# Patient Record
Sex: FEMALE | Race: WHITE | NOT HISPANIC OR LATINO | Employment: OTHER | URBAN - METROPOLITAN AREA
[De-identification: names, ages, dates, MRNs, and addresses within clinical notes are randomized per-mention and may not be internally consistent; named-entity substitution may affect disease eponyms.]

---

## 2017-04-03 ENCOUNTER — ALLSCRIPTS OFFICE VISIT (OUTPATIENT)
Dept: OTHER | Facility: OTHER | Age: 62
End: 2017-04-03

## 2017-04-03 LAB
BILIRUB UR QL STRIP: NEGATIVE
CLARITY UR: NORMAL
COLOR UR: NORMAL
GLUCOSE (HISTORICAL): NEGATIVE
HGB UR QL STRIP.AUTO: NORMAL
KETONES UR STRIP-MCNC: NORMAL MG/DL
LEUKOCYTE ESTERASE UR QL STRIP: NORMAL
NITRITE UR QL STRIP: NEGATIVE
PH UR STRIP.AUTO: 7 [PH]
PROT UR STRIP-MCNC: NEGATIVE MG/DL
SP GR UR STRIP.AUTO: 1.01
UROBILINOGEN UR QL STRIP.AUTO: 0.2

## 2017-04-06 ENCOUNTER — LAB CONVERSION - ENCOUNTER (OUTPATIENT)
Dept: OTHER | Facility: OTHER | Age: 62
End: 2017-04-06

## 2017-04-06 LAB
BACTERIA UR QL AUTO: ABNORMAL /HPF
BILIRUB UR QL STRIP: NEGATIVE
COLOR UR: YELLOW
COMMENT (HISTORICAL): CLEAR
CULTURE RESULT (HISTORICAL): ABNORMAL
FECAL OCCULT BLOOD DIAGNOSTIC (HISTORICAL): NEGATIVE
GLUCOSE (HISTORICAL): NEGATIVE
HYALINE CASTS #/AREA URNS LPF: ABNORMAL /LPF
KETONES UR STRIP-MCNC: ABNORMAL MG/DL
LEUKOCYTE ESTERASE UR QL STRIP: ABNORMAL
NITRITE UR QL STRIP: NEGATIVE
PH UR STRIP.AUTO: 6.5 [PH] (ref 5–8)
PROT UR STRIP-MCNC: NEGATIVE MG/DL
RBC (HISTORICAL): ABNORMAL /HPF
SP GR UR STRIP.AUTO: 1.02 (ref 1–1.03)
SQUAMOUS EPITHELIAL CELLS (HISTORICAL): ABNORMAL /HPF
WBC # BLD AUTO: ABNORMAL /HPF

## 2017-04-07 ENCOUNTER — GENERIC CONVERSION - ENCOUNTER (OUTPATIENT)
Dept: OTHER | Facility: OTHER | Age: 62
End: 2017-04-07

## 2017-04-10 ENCOUNTER — ALLSCRIPTS OFFICE VISIT (OUTPATIENT)
Dept: OTHER | Facility: OTHER | Age: 62
End: 2017-04-10

## 2017-04-10 ENCOUNTER — APPOINTMENT (OUTPATIENT)
Dept: LAB | Facility: HOSPITAL | Age: 62
End: 2017-04-10
Payer: COMMERCIAL

## 2017-04-10 DIAGNOSIS — L29.8 OTHER PRURITUS: ICD-10-CM

## 2017-04-10 PROCEDURE — 87077 CULTURE AEROBIC IDENTIFY: CPT

## 2017-04-10 PROCEDURE — 87070 CULTURE OTHR SPECIMN AEROBIC: CPT

## 2017-04-10 PROCEDURE — 87186 SC STD MICRODIL/AGAR DIL: CPT

## 2017-04-12 ENCOUNTER — GENERIC CONVERSION - ENCOUNTER (OUTPATIENT)
Dept: OTHER | Facility: OTHER | Age: 62
End: 2017-04-12

## 2017-04-12 LAB — BACTERIA GENITAL AEROBE CULT: NORMAL

## 2017-04-14 ENCOUNTER — ALLSCRIPTS OFFICE VISIT (OUTPATIENT)
Dept: OTHER | Facility: OTHER | Age: 62
End: 2017-04-14

## 2018-01-11 NOTE — RESULT NOTES
Discussion/Summary   genital culture grew E coli bacteria   she may continue to take the Keflex as directed     - Dr Gretta Gamble      Verified Results  (1) GENITAL COMPREHENSIVE CULTURE [id number] 08:58PM Rafael Sharma Order Number: [id number]     Test Name Result Flag Reference   CLINICAL REPORT (Report)     Test:        Genital Comprehensive Culture  Specimen Type:   Genital  Specimen Date:   4/10/2017 8:58 PM  Result Date:    4/12/2017 10:17 AM  Result Status:   Final result  Resulting Lab:   [facility] [address] [place] [address]            Tel: [phone]      CULTURE                                       ------------------                                   2+ Growth of Escherichia coli      SUSCEPTIBILITY                                   ------------------                                                       Escherichia coli  METHOD                 SANDEE  -------------------------------------  --------------------------  AMPICILLIN ($$)             >16 00 ug/ml Resistant  AMPICILLIN + SULBACTAM ($)       16/8 ug/ml  Intermediate  AZTREONAM ($$$)             <=8 ug/ml   Susceptible  CEFAZOLIN ($)              <=8 00 ug/ml Susceptible  CIPROFLOXACIN ($)            <=1 00 ug/ml Susceptible  GENTAMICIN ($$)             <=4 ug/ml   Susceptible  LEVOFLOXACIN ($)            <=2 00 ug/ml Susceptible  PIPERACILLIN + TAZOBACTAM ($$$)     <=16 ug/ml  Susceptible  TETRACYCLINE              >8 ug/ml   Resistant  TOBRAMYCIN ($)             <=4 ug/ml   Susceptible  TRIMETHOPRIM + SULFAMETHOXAZOLE ($$$)  >2/38 ug/ml  Resistant   Performing Comments: please look for yeast, candida, fungus, strep       Signatures   Electronically signed by : Raisa Bassett MD; Apr 12 2017 10:26AM EST                       (Author)

## 2018-01-12 VITALS
SYSTOLIC BLOOD PRESSURE: 140 MMHG | HEART RATE: 84 BPM | HEIGHT: 66 IN | RESPIRATION RATE: 16 BRPM | WEIGHT: 125.6 LBS | TEMPERATURE: 97.8 F | DIASTOLIC BLOOD PRESSURE: 80 MMHG | BODY MASS INDEX: 20.18 KG/M2

## 2018-01-12 NOTE — RESULT NOTES
Verified Results  (1) URINALYSIS w URINE C/S REFLEX (will reflex a microscopy if leukocytes, occult blood, or nitrites are not within normal limits) 03Apr2017 12:00AM Linda Briseno     Test Name Result Flag Reference   COLOR YELLOW  YELLOW   APPEARANCE CLEAR  CLEAR   SPECIFIC GRAVITY 1 016  1 001-1 035   PH 6 5  5 0-8 0   GLUCOSE NEGATIVE  NEGATIVE   BILIRUBIN NEGATIVE  NEGATIVE   KETONES TRACE A NEGATIVE   OCCULT BLOOD NEGATIVE  NEGATIVE   PROTEIN NEGATIVE  NEGATIVE   NITRITE NEGATIVE  NEGATIVE   LEUKOCYTE ESTERASE 1+ A NEGATIVE   WBC 0-5 /HPF  < OR = 5   RBC NONE SEEN /HPF  < OR = 2   SQUAMOUS EPITHELIAL CELLS 0-5 /HPF  < OR = 5   BACTERIA FEW /HPF A NONE SEEN   HYALINE CAST NONE SEEN /LPF  NONE SEEN   REFLEXIVE URINE CULTURE      CULTURE INDICATED - RESULTS TO FOLLOW     REFLEXIVE URINE CULTURE 03Apr2017 12:00AM Linda Briseno     Test Name Result Flag Reference   CULTURE, URINE, ROUTINE  A    CULTURE, URINE, ROUTINE         MICRO NUMBER:      [id number]    TEST STATUS:       FINAL    SPECIMEN SOURCE:   URINE    SPECIMEN QUALITY:  ADEQUATE    RESULT:            10,000-50,000 CFU/mL of Group B Streptococcus isolated                       Beta-hemolytic Streptococci are predictably                       susceptible to penicillin and other beta-lactams  Susceptibility testing not routinely performed  COMMENT:           Erythromycin and clindamycin are not recommended                       for treatment of urinary tract infections,                       but clindamycin may be useful for treatment of                       rectovaginal colonization or infection  COMMENT:           Additional organism(s) less than 10,000 CFU/mL                       isolated  These organisms, commonly found on                       external and internal genitalia, are considered                       colonizers  No further testing performed

## 2018-01-12 NOTE — RESULT NOTES
Verified Results  (Q) COMPREHENSIVE METABOLIC PNL W/ADJUSTED CALCIUM 11Oct2016 10:01AM Yoli Sharma     Test Name Result Flag Reference   GLUCOSE 87 mg/dL  65-99   Fasting reference interval   UREA NITROGEN (BUN) 14 mg/dL  7-25   CREATININE 0 82 mg/dL  0 50-0 99   For patients >52years of age, the reference limit  for Creatinine is approximately 13% higher for people  identified as -American  eGFR NON-AFR  AMERICAN 76 mL/min/1 73m2  > OR = 60   eGFR AFRICAN AMERICAN 88 mL/min/1 73m2  > OR = 60   BUN/CREATININE RATIO   4-28   NOT APPLICABLE (calc)   SODIUM 135 mmol/L  135-146   POTASSIUM 4 3 mmol/L  3 5-5 3   CHLORIDE 97 mmol/L L    CARBON DIOXIDE 27 mmol/L  20-31   CALCIUM 10 1 mg/dL  8 6-10 4   CALCIUM (ADJUSTED FOR$ALBUMIN) 9 3 mg/dL (calc)  8 6-10 2   PROTEIN, TOTAL 7 7 g/dL  6 1-8 1   ALBUMIN 5 6 g/dL H 3 6-5 1   Verified by repeat analysis  GLOBULIN 2 1 g/dL (calc)  1 9-3 7   ALBUMIN/GLOBULIN RATIO 2 7 (calc) H 1 0-2 5   BILIRUBIN, TOTAL 0 9 mg/dL  0 2-1 2   ALKALINE PHOSPHATASE 59 U/L     AST 22 U/L  10-35   ALT 18 U/L  6-29     (Q) TSH, 3RD GENERATION W/REFLEX TO FT4 11Oct2016 10:01AM Yoli Sharma   REPORT COMMENT:  FASTING:YES     Test Name Result Flag Reference   TSH W/REFLEX TO FT4 2 21 mIU/L  0 40-4 50     (Q) LIPID PANEL WITH DIRECT LDL 11Oct2016 10:01AM Yoli Sharma     Test Name Result Flag Reference   CHOLESTEROL, TOTAL 200 mg/dL  125-200   HDL CHOLESTEROL 75 mg/dL  > OR = 46   TRIGLICERIDES 60 mg/dL  <080   DIRECT  mg/dL  <130   Desirable range <100 mg/dL for patients with CHD or  diabetes and <70 mg/dL for diabetic patients with  known heart disease  CHOL/HDLC RATIO 2 7 (calc)  < OR = 5 0   NON HDL CHOLESTEROL 125 mg/dL (calc)     Target for non-HDL cholesterol is 30 mg/dL higher than   LDL cholesterol target       (Q) MICROALBUMIN, RANDOM URINE (W/CREATININE) [id number] 10:01AM Yoli Sharma     Test Name Result Flag Reference   CREATININE, RANDOM URINE 103 mg/dL    MICROALBUMIN 0 6 mg/dL     Reference Range  Not established   MICROALBUMIN/CREATININE$RATIO, RANDOM URINE 6 mcg/mg creat  <30   The ADA defines abnormalities in albumin  excretion as follows:     Category         Result (mcg/mg creatinine)     Normal                    <30  Microalbuminuria            Clinical albuminuria   > OR = 300     The ADA recommends that at least two of three  specimens collected within a 3-6 month period be  abnormal before considering a patient to be  within a diagnostic category  (Q) CBC (INCLUDES DIFF/PLT) (REFL) 11Oct2016 10:01AM Yoli Sharma     Test Name Result Flag Reference   WHITE BLOOD CELL COUNT 8 9 Thousand/uL  3 8-10 8   RED BLOOD CELL COUNT 4 37 Million/uL  3 80-5 10   HEMOGLOBIN 12 8 g/dL  11 7-15 5   HEMATOCRIT 38 6 %  35 0-45 0   MCV 88 3 fL  80 0-100 0   MCH 29 3 pg  27 0-33 0   MCHC 33 2 g/dL  32 0-36 0   RDW 14 0 %  11 0-15 0   PLATELET COUNT 971 Thousand/uL  140-400   MPV 8 0 fL  7 5-11 5   ABSOLUTE NEUTROPHILS 6862 cells/uL  3639-4429   ABSOLUTE LYMPHOCYTES 1193 cells/uL  850-3900   ABSOLUTE MONOCYTES 739 cells/uL  200-950   ABSOLUTE EOSINOPHILS 62 cells/uL     ABSOLUTE BASOPHILS 45 cells/uL  0-200   NEUTROPHILS 77 1 %     LYMPHOCYTES 13 4 %     MONOCYTES 8 3 %     EOSINOPHILS 0 7 %     BASOPHILS 0 5 %         Discussion/Summary   CHOLESTEROL LOOKS GOOD   NORMAL BLOOD SUGAR LEVEL   NORMAL KIDNEY AND LIVER FUNCTION    OVERALL LOOKS VERY GOOD!   - DR SHARMA

## 2018-01-12 NOTE — PROGRESS NOTES
Assessment    1  Encounter for preventive health examination (V70 0) (Z00 00)   2  Raynaud's syndrome without gangrene (443 0) (I73 00)   3  Benign essential hypertension (401 1) (I10)   4  Need for influenza vaccination (V04 81) (Z23)   5  Hair loss (704 00) (L65 9)   6  H/O herpes labialis (V12 09) (Z86 19)   7  Colon cancer screening (V76 51) (Z12 11)   8  Visit for screening mammogram (V76 12) (Z12 31)    Plan  Benign essential hypertension    · From  Lisinopril 10 MG Oral Tablet TAKE 1 TABLET BY MOUTH ONCE DAILY  To Lisinopril 20 MG Oral Tablet take 0 5 tablet daily  Benign essential hypertension, Hair loss    · (Q) CBC (INCLUDES DIFF/PLT) (REFL); Status:Active; Requested for:05Oct2016;    · (Q) COMPREHENSIVE METABOLIC PNL W/ADJUSTED CALCIUM; Status:Active; Requested for:05Oct2016;    · (Q) LIPID PANEL WITH DIRECT LDL; Status:Active; Requested for:05Oct2016;    · (Q) MICROALBUMIN, RANDOM URINE (W/CREATININE); Status:Active; Requested  for:05Oct2016;    · (Q) TSH, 3RD GENERATION W/REFLEX TO FT4; Status:Active; Requested  for:05Oct2016;   Colon cancer screening    · COLONOSCOPY; Status:Active; Requested for:05Oct2016;   H/O herpes labialis    · ValACYclovir HCl - 1 GM Oral Tablet; TAKE 2 TABLETS AT FIRST SIGN OF  ERUPTION,THEN 2 TABLETS EVERY 12 HOURS UNTIL GONE  Hair loss    · Leonie AMAYA, Belgica Jung  (Dermatology) Physician Referral  Consult  Status: Hold For -  Scheduling  Requested for: 96ZYP9587  Care Summary provided  : Yes  Health Maintenance    · Follow-up visit in 1 year Evaluation and Treatment  Follow-up  Status: Hold For -  Scheduling  Requested for: 21VRE1401   · Drink plenty of fluids ; Status:Complete;   Done: 67WUC8648   · Eat a low fat and low cholesterol diet ; Status:Complete;   Done: 82KIP8381   · Eat a normal well-balanced diet ; Status:Complete;   Done: 25YGT9417   · Eat foods that are high in calcium ; Status:Complete;   Done: 67MKL9797   · There are many exercise options for seniors  ; Status:Complete;   Done: 94YPJ6997   · These are things you can do to prevent falls in and around the home ; Status:Complete;    Done: 56TUM7050   · Use a sun block product with an SPF of 15 or more ; Status:Complete;   Done:  38AAN3145   · Vitamins can help you get daily requirements that your diet may not be giving you ;  Status:Complete;   Done: 88DER2600   · We encourage all of our patients to exercise regularly  30 minutes of exercise or physical  activity five or more days a week is recommended for children and adults ;  Status:Complete;   Done: 09TMJ8349   · We recommend that you create an advance directive ; Status:Complete;   Done:  87YKG9333   · Call [phone] if: You find a new or different kind of lump in your breast ;  Status:Complete;   Done: 84SIQ9367   · Call [phone] if: You have any bleeding from the vagina ; Status:Complete;    Done: 13PEZ0806   · Call [phone] if: You have any warning signs of skin cancer ; Status:Complete;    Done: 67KWX3266  Need for influenza vaccination    · Fluzone Quadrivalent 0 5 ML Intramuscular Suspension Prefilled Syringe  Raynaud's syndrome without gangrene    · From  Metoprolol Succinate ER 50 MG Oral Tablet Extended Release 24 Hour  TAKE 1 TABLET ONCE DAILY To Metoprolol Succinate ER 50 MG Oral Tablet Extended  Release 24 Hour take 0 5 tablet daily  Visit for screening mammogram    · * MAMMO SCREENING BILATERAL W CAD; Status:Hold For - Scheduling; Requested  for:05Oct2016;     Discussion/Summary  health maintenance visit Currently, she eats a healthy diet  Chief Complaint  PT here for annual wellness,she needs a referral for insurance purposes             History of Present Illness  HM, Adult Female: The patient is being seen for a health maintenance evaluation  The last health maintenance visit was year(s) ago  General Health: The patient's health since the last visit is described as good  She has regular dental visits   She complains of vision problems  Vision care includes wearing glasses  She denies hearing loss  Immunizations status: not up to date  Lifestyle:  She consumes a diverse and healthy diet  She does not have any weight concerns  She exercises regularly  She does not use tobacco  She denies alcohol use  She denies drug use  Reproductive health:  she reports normal menses  she uses no contraception  she is sexually active  pregnancy history: G 3P 3, 3  Screening: cancer screening reviewed and updated  metabolic screening reviewed and updated  risk screening reviewed and updated  HPI: [place] the [place] 3 years ago  works as a realtor here        Review of Systems    Constitutional: No fever, no chills, feels well, no tiredness, no recent weight gain or weight loss  Eyes: No complaints of eye pain, no red eyes, no eyesight problems, no discharge, no dry eyes, no itching of eyes  ENT: no complaints of earache, no loss of hearing, no nose bleeds, no nasal discharge, no sore throat, no hoarseness  Cardiovascular: No complaints of slow heart rate, no fast heart rate, no chest pain, no palpitations, no leg claudication, no lower extremity edema  Respiratory: No complaints of shortness of breath, no wheezing, no cough, no SOB on exertion, no orthopnea, no PND  Gastrointestinal: No complaints of abdominal pain, no constipation, no nausea or vomiting, no diarrhea, no bloody stools  Genitourinary: No complaints of dysuria, no incontinence, no pelvic pain, no dysmenorrhea, no vaginal discharge or bleeding  Musculoskeletal: No complaints of arthralgias, no myalgias, no joint swelling or stiffness, no limb pain or swelling  Integumentary: No complaints of skin rash or lesions, no itching, no skin wounds, no breast pain or lump  Neurological: No complaints of headache, no confusion, no convulsions, no numbness, no dizziness or fainting, no tingling, no limb weakness, no difficulty walking  Psychiatric: Not suicidal, no sleep disturbance, no anxiety or depression, no change in personality, no emotional problems  Endocrine: as noted in HPI  Hematologic/Lymphatic: No complaints of swollen glands, no swollen glands in the neck, does not bleed easily, does not bruise easily  Active Problems    1  Anxiety (300 00) (F41 9)   2  Benign essential hypertension (401 1) (I10)   3  Raynaud's syndrome without gangrene (443 0) (I73 00)   4  Skin rash (782 1) (R21)    Past Medical History    · History of Known health problems: none    Family History  Mother    · FH: early coronary artery disease (V17 3) (Z80 55)  Father    · Family history of coronary artery disease (V17 3) (Z82 49)   · FH: early coronary artery disease (V17 3) (Z82 49)    Social History    · Former smoker (V15 82) (D44 162)   · Pack a day for about 20 years, quit 10 years ago   · Occasional alcohol use    Current Meds   1  Lisinopril 10 MG Oral Tablet; TAKE 1 TABLET BY MOUTH ONCE DAILY; Therapy: 58VYD5847 to (Evaluate:03Jun2015) Recorded   2  Metoprolol Succinate ER 50 MG Oral Tablet Extended Release 24 Hour; TAKE 1 TABLET   ONCE DAILY; Therapy: 14OIG2064 to Recorded    Allergies    1  Penicillins    Vitals   Recorded: 32LPY4947 65:91DO   Systolic 648   Diastolic 64   Heart Rate 72   Respiration 16   Temperature 97 4 F   Height 5 ft 6 3 in   Weight 124 lb 4 oz   BMI Calculated 19 87   BSA Calculated 1 64     Physical Exam    Constitutional   General appearance: No acute distress, well appearing and well nourished  Head and Face   Head and face: Normal     Palpation of the face and sinuses: No sinus tenderness  Eyes   Conjunctiva and lids: No swelling, erythema or discharge  Pupils and irises: Equal, round, reactive to light  Ophthalmoscopic examination: Normal fundi and optic discs      Ears, Nose, Mouth, and Throat   External inspection of ears and nose: Normal     Otoscopic examination: Tympanic membranes translucent with normal light reflex  Canals patent without erythema  Hearing: Normal     Nasal mucosa, septum, and turbinates: Normal without edema or erythema  Lips, teeth, and gums: Normal, good dentition  Oropharynx: Normal with no erythema, edema, exudate or lesions  Neck   Neck: Supple, symmetric, trachea midline, no masses  Thyroid: Normal, no thyromegaly  Pulmonary   Respiratory effort: No increased work of breathing or signs of respiratory distress  Percussion of chest: Normal     Auscultation of lungs: Clear to auscultation  Cardiovascular   Palpation of heart: Normal PMI, no thrills  Auscultation of heart: Normal rate and rhythm, normal S1 and S2, no murmurs  Examination of extremities for edema and/or varicosities: Normal     Chest   Chest: Normal     Abdomen   Abdomen: Non-tender, no masses  Liver and spleen: No hepatomegaly or splenomegaly  Examination for hernias: No hernia appreciated  Lymphatic   Palpation of lymph nodes in neck: No lymphadenopathy  Palpation of lymph nodes in axillae: No lymphadenopathy  Musculoskeletal   Gait and station: Normal     Digits and nails: Normal without clubbing or cyanosis  Joints, bones, and muscles: Normal     Range of motion: Normal     Stability: Normal     Muscle strength/tone: Normal     Skin   Skin and subcutaneous tissue: Normal without rashes or lesions  Palpation of skin and subcutaneous tissue: Normal turgor  Neurologic   Cranial nerves: Cranial nerves II-XII intact  Cortical function: Normal mental status  Reflexes: 2+ and symmetric  Sensation: No sensory loss  Coordination: Normal finger to nose and heel to shin  Psychiatric   Judgment and insight: Normal     Orientation to person, place, and time: Normal     Recent and remote memory: Intact      Mood and affect: Normal        Signatures   Electronically signed by : Leila Ramirez MD; Oct  5 2016 12:23PM EST                       (Author)

## 2018-01-13 VITALS
RESPIRATION RATE: 18 BRPM | HEART RATE: 74 BPM | BODY MASS INDEX: 18.55 KG/M2 | WEIGHT: 116 LBS | DIASTOLIC BLOOD PRESSURE: 92 MMHG | SYSTOLIC BLOOD PRESSURE: 162 MMHG

## 2018-01-14 VITALS
RESPIRATION RATE: 18 BRPM | WEIGHT: 120.25 LBS | DIASTOLIC BLOOD PRESSURE: 80 MMHG | BODY MASS INDEX: 19.23 KG/M2 | HEART RATE: 84 BPM | SYSTOLIC BLOOD PRESSURE: 158 MMHG | OXYGEN SATURATION: 98 %

## 2018-01-15 NOTE — RESULT NOTES
Verified Results  (1) URINE CULTURE [id number] 12:00AM Yoli Sharma     Test Name Result Flag Reference   CULTURE, URINE, ROUTINE      CULTURE, URINE, ROUTINE         MICRO NUMBER:      [id number]    TEST STATUS:       FINAL    SPECIMEN SOURCE:   URINE    SPECIMEN QUALITY:  ADEQUATE    RESULT:            No Growth       Discussion/Summary   negative urine culture  may finish the antibiotics if she is feeling better  it probably was bladder irritation    if the symptoms persist, will refer her to urologist   - Dr Darya Higginbotham   Electronically signed by : Crystal Whiteside MD; Dec 15 2016 10:22AM EST                       (Author)

## 2018-01-15 NOTE — RESULT NOTES
Verified Results  (Q) THINPREP TIS PAP AND HR HPV DNA 11Oct2016 12:00AM Zachary Yoli     Test Name Result Flag Reference   CLINICAL INFORMATION:      NONE GIVEN   LMP:      NONE GIVEN   PREV  PAP:      NONE GIVEN   PREV  BX:      NONE GIVEN   SOURCE:      ENDOCERVIX   STATEMENT OF ADEQUACY:      Satisfactory for evaluation  Endocervical/transformation zone component  present  INTERPRETATION/RESULT:      Negative for intraepithelial lesion or malignancy  COMMENT:      This Pap test has been evaluated with computer  assisted technology  CYTOTECHNOLOGIST:      JOSE FRANCISCO HERNANDEZ(ASCP)  CT screening location: [address] [facility],  [address]   HPV mRNA E6/E7 Detected A Not Detected   This test was performed using the APTIMA HPV Assay ([employer] )  This assay detects E6/E7 viral messenger RNA (mRNA) from 14  high-risk HPV types (16,18,31,33,35,39,45,51,52,56,58,59,66,68)         Discussion/Summary   Normal pap smear    - Dr Scotty Hall   Electronically signed by : Tonny Morin MD; Oct 19 2016 11:55AM EST                       (Author)

## 2018-03-28 ENCOUNTER — OFFICE VISIT (OUTPATIENT)
Dept: FAMILY MEDICINE CLINIC | Facility: CLINIC | Age: 63
End: 2018-03-28
Payer: COMMERCIAL

## 2018-03-28 VITALS
RESPIRATION RATE: 16 BRPM | BODY MASS INDEX: 20.96 KG/M2 | HEART RATE: 72 BPM | WEIGHT: 130.4 LBS | SYSTOLIC BLOOD PRESSURE: 152 MMHG | DIASTOLIC BLOOD PRESSURE: 88 MMHG | HEIGHT: 66 IN

## 2018-03-28 DIAGNOSIS — I10 BENIGN ESSENTIAL HYPERTENSION: ICD-10-CM

## 2018-03-28 DIAGNOSIS — Z12.31 VISIT FOR SCREENING MAMMOGRAM: ICD-10-CM

## 2018-03-28 DIAGNOSIS — F41.9 ANXIETY: Primary | ICD-10-CM

## 2018-03-28 DIAGNOSIS — Z23 NEED FOR DIPHTHERIA-TETANUS-PERTUSSIS (TDAP) VACCINE, ADULT/ADOLESCENT: Primary | ICD-10-CM

## 2018-03-28 DIAGNOSIS — Z00.00 WELL ADULT EXAM: Primary | ICD-10-CM

## 2018-03-28 DIAGNOSIS — Z12.11 COLON CANCER SCREENING: ICD-10-CM

## 2018-03-28 PROCEDURE — 90715 TDAP VACCINE 7 YRS/> IM: CPT

## 2018-03-28 PROCEDURE — 99396 PREV VISIT EST AGE 40-64: CPT | Performed by: FAMILY MEDICINE

## 2018-03-28 PROCEDURE — 90471 IMMUNIZATION ADMIN: CPT

## 2018-03-28 RX ORDER — ALPRAZOLAM 0.5 MG/1
0.5 TABLET ORAL DAILY
Qty: 30 TABLET | Refills: 0 | OUTPATIENT
Start: 2018-03-28 | End: 2018-05-29 | Stop reason: SDUPTHER

## 2018-03-28 RX ORDER — METOPROLOL SUCCINATE 50 MG/1
50 TABLET, EXTENDED RELEASE ORAL DAILY
Qty: 90 TABLET | Refills: 3 | Status: SHIPPED | OUTPATIENT
Start: 2018-03-28 | End: 2018-09-06 | Stop reason: SDUPTHER

## 2018-03-28 RX ORDER — LISINOPRIL 20 MG/1
20 TABLET ORAL DAILY
Qty: 90 TABLET | Refills: 0 | Status: SHIPPED | OUTPATIENT
Start: 2018-03-28 | End: 2018-09-06 | Stop reason: SDUPTHER

## 2018-03-28 NOTE — PROGRESS NOTES
Loreto was seen today for physical exam     Diagnoses and all orders for this visit:    Well adult exam    Colon cancer screening  -     Ambulatory referral to Gastroenterology    Benign essential hypertension  -     Comprehensive metabolic panel  -     Lipid Panel with Direct LDL reflex  -     Microalbumin / creatinine urine ratio  -     lisinopril (ZESTRIL) 20 mg tablet; Take 1 tablet (20 mg total) by mouth daily  -     metoprolol succinate (TOPROL-XL) 50 mg 24 hr tablet; Take 1 tablet (50 mg total) by mouth daily    Visit for screening mammogram  -     Mammo screening bilateral w cad; Future      Patient Counseling:  --Nutrition: Stressed importance of moderation in sodium/caffeine intake, saturated fat and cholesterol, caloric balance, sufficient intake of fresh fruits, vegetables, fiber, calcium, iron, and 1 mg of folate supplement per day   --Discussed  calcium supplement, and the daily use of baby aspirin  --Exercise: Stressed the importance of regular exercise  --Substance Abuse: Discussed cessation/primary prevention of tobacco, alcohol, or other drug use; driving or other dangerous activities under the influence; availability of treatment for abuse  --Sexuality: Discussed sexually transmitted diseases, partner selection, use of condoms, avoidance of unintended pregnancy  and contraceptive alternatives  --Injury prevention: Discussed safety belts, safety helmets, smoke detector, smoking near bedding or upholstery  --Dental health: Discussed importance of regular tooth brushing, flossing, and dental visits  --Immunizations reviewed  --Discussed benefits of screening colonoscopy    Chief Complaint   Patient presents with    Physical Exam     Pt here for Physical       HPI    Patient here for a comprehensive physical exam The patient reports no problems    Do you take any herbs or supplements that were not prescribed by a doctor? no   Are you taking calcium supplements?  yes  Are you taking aspirin daily? no  How often do you exercise? Tried to get 5000-22219 steps per day  Diet? 2-3 servings of vegetables and fruits   Dental visit: yearly     Vision test: wears glasses     Colonoscopy:  More than 10 years ago at [address] [place] [address] History:  LMP: No LMP recorded  Patient is postmenopausal   Last pap date:   Abnormal pap? no  : 3  Para: 3        History   Sexual Activity    Sexual activity: Yes    Partners: Male     Review of Systems   Constitutional: Negative  HENT: Negative  Eyes: Negative  Respiratory: Negative  Cardiovascular: Negative  Gastrointestinal: Negative  Endocrine: Negative  Genitourinary: Negative  Musculoskeletal: Negative  Skin: Negative  Allergic/Immunologic: Negative  Neurological: Negative  Hematological: Negative  Psychiatric/Behavioral: Negative  Family History   Problem Relation Age of Onset    Coronary artery disease Mother      early   [facility] Coronary artery disease Father      early       Past Medical History:   Diagnosis Date    Hypertension          Social History     Social History    Marital status:      Spouse name: N/A    Number of children: N/A    Years of education: N/A     Occupational History    Not on file       Social History Main Topics    Smoking status: Never Smoker    Smokeless tobacco: Never Used      Comment: former smoker per [employer],pack a day for about 20 years, quit 10 years ago    Alcohol use 1 2 oz/week     2 Glasses of wine per week      Comment: occasional    Drug use: No    Sexual activity: Yes     Partners: Male     Other Topics Concern    Not on file     Social History Narrative    No narrative on file       Current Outpatient Prescriptions on File Prior to Visit   Medication Sig Dispense Refill    ALPRAZolam (XANAX) 0 5 mg tablet Take 1 tablet by mouth      aspirin 81 MG tablet Take 81 mg by mouth daily      triamcinolone (KENALOG) 0 1 % cream Apply topically 2 (two) times a day as needed        [DISCONTINUED] lisinopril (ZESTRIL) 20 mg tablet Take 10 mg by mouth daily at bedtime        [DISCONTINUED] lisinopril (ZESTRIL) 20 mg tablet Take 1 tablet by mouth daily      [DISCONTINUED] metoprolol succinate (TOPROL-XL) 50 mg 24 hr tablet Take 1 tablet by mouth daily      [DISCONTINUED] metoprolol tartrate (LOPRESSOR) 50 mg tablet Take 25 mg by mouth daily at bedtime         No current facility-administered medications on file prior to visit  Allergies   Allergen Reactions    Penicillins          Vitals:    03/28/18 0824   BP: 152/88   Pulse: 72   Resp: 16   Weight: 59 1 kg (130 lb 6 4 oz)   Height: 5' 6 18" (1 681 m)         Physical Exam   Constitutional: She is oriented to person, place, and time  Vital signs are normal  She appears well-developed and well-nourished  HENT:   Head: Normocephalic and atraumatic  Nose: Nose normal    Mouth/Throat: Oropharynx is clear and moist    Eyes: Pupils are equal, round, and reactive to light  Neck: Normal range of motion  Neck supple  No thyromegaly present  Cardiovascular: Normal rate and regular rhythm  No murmur heard  Pulmonary/Chest: Effort normal and breath sounds normal    Abdominal: Soft  Bowel sounds are normal    Musculoskeletal: Normal range of motion  She exhibits no edema or deformity  Neurological: She is alert and oriented to person, place, and time  She has normal reflexes  Skin: Skin is warm  No rash noted  No erythema  Psychiatric: She has a normal mood and affect   Her behavior is normal

## 2018-04-30 ENCOUNTER — TELEPHONE (OUTPATIENT)
Dept: GASTROENTEROLOGY | Facility: MEDICAL CENTER | Age: 63
End: 2018-04-30

## 2018-04-30 DIAGNOSIS — Z12.11 COLON CANCER SCREENING: Primary | ICD-10-CM

## 2018-04-30 NOTE — TELEPHONE ENCOUNTER
PLEASE SEND SUPREP TO PHARMACY ON FILE THANK YOU         PT CALLED SCHED COLON OA AT [facility] WITH DR Beebe 90 ON 06/11/2018 MAILED PT INSTRUCTIONS TO SUPREP AND PROCEDURE PT IS AWARE SHE WILL GET A CALL THE DAY BEFORE WITH TIME

## 2018-05-29 DIAGNOSIS — F41.9 ANXIETY: ICD-10-CM

## 2018-05-30 ENCOUNTER — ANESTHESIA EVENT (OUTPATIENT)
Dept: PERIOP | Facility: AMBULARY SURGERY CENTER | Age: 63
End: 2018-05-30

## 2018-05-30 RX ORDER — ALPRAZOLAM 0.5 MG/1
0.5 TABLET ORAL DAILY
Qty: 90 TABLET | Refills: 0 | OUTPATIENT
Start: 2018-05-30 | End: 2018-07-17 | Stop reason: SDUPTHER

## 2018-05-31 ENCOUNTER — APPOINTMENT (OUTPATIENT)
Dept: LAB | Facility: CLINIC | Age: 63
End: 2018-05-31
Payer: COMMERCIAL

## 2018-05-31 LAB
ALBUMIN SERPL BCP-MCNC: 4.3 G/DL (ref 3.5–5)
ALP SERPL-CCNC: 62 U/L (ref 46–116)
ALT SERPL W P-5'-P-CCNC: 21 U/L (ref 12–78)
ANION GAP SERPL CALCULATED.3IONS-SCNC: 9 MMOL/L (ref 4–13)
AST SERPL W P-5'-P-CCNC: 22 U/L (ref 5–45)
BILIRUB SERPL-MCNC: 0.5 MG/DL (ref 0.2–1)
BUN SERPL-MCNC: 11 MG/DL (ref 5–25)
CALCIUM SERPL-MCNC: 9.5 MG/DL (ref 8.3–10.1)
CHLORIDE SERPL-SCNC: 101 MMOL/L (ref 100–108)
CHOLEST SERPL-MCNC: 159 MG/DL (ref 50–200)
CO2 SERPL-SCNC: 29 MMOL/L (ref 21–32)
CREAT SERPL-MCNC: 0.84 MG/DL (ref 0.6–1.3)
GFR SERPL CREATININE-BSD FRML MDRD: 74 ML/MIN/1.73SQ M
GLUCOSE P FAST SERPL-MCNC: 84 MG/DL (ref 65–99)
HDLC SERPL-MCNC: 56 MG/DL (ref 40–60)
LDLC SERPL CALC-MCNC: 89 MG/DL (ref 0–100)
POTASSIUM SERPL-SCNC: 4.2 MMOL/L (ref 3.5–5.3)
PROT SERPL-MCNC: 7.5 G/DL (ref 6.4–8.2)
SODIUM SERPL-SCNC: 139 MMOL/L (ref 136–145)
TRIGL SERPL-MCNC: 72 MG/DL

## 2018-05-31 PROCEDURE — 36415 COLL VENOUS BLD VENIPUNCTURE: CPT | Performed by: FAMILY MEDICINE

## 2018-05-31 PROCEDURE — 80061 LIPID PANEL: CPT | Performed by: FAMILY MEDICINE

## 2018-05-31 PROCEDURE — 80053 COMPREHEN METABOLIC PANEL: CPT | Performed by: FAMILY MEDICINE

## 2018-06-07 ENCOUNTER — TELEPHONE (OUTPATIENT)
Dept: GASTROENTEROLOGY | Facility: CLINIC | Age: 63
End: 2018-06-07

## 2018-06-07 NOTE — TELEPHONE ENCOUNTER
Patient called to reschedule appointment due to loss of insurance reschedule to 8/13/18 with Dr Sofia Barraza at [facility]

## 2018-06-11 ENCOUNTER — ANESTHESIA (OUTPATIENT)
Dept: PERIOP | Facility: AMBULARY SURGERY CENTER | Age: 63
End: 2018-06-11

## 2018-07-17 DIAGNOSIS — F41.9 ANXIETY: ICD-10-CM

## 2018-07-17 RX ORDER — ALPRAZOLAM 0.5 MG/1
0.5 TABLET ORAL DAILY
Qty: 90 TABLET | Refills: 0 | Status: SHIPPED | OUTPATIENT
Start: 2018-07-17 | End: 2018-08-01 | Stop reason: SDUPTHER

## 2018-08-01 ENCOUNTER — TELEPHONE (OUTPATIENT)
Dept: FAMILY MEDICINE CLINIC | Facility: CLINIC | Age: 63
End: 2018-08-01

## 2018-08-01 DIAGNOSIS — F41.9 ANXIETY: ICD-10-CM

## 2018-08-01 RX ORDER — ALPRAZOLAM 0.5 MG/1
0.5 TABLET ORAL DAILY
Qty: 90 TABLET | Refills: 0 | Status: SHIPPED | OUTPATIENT
Start: 2018-08-01 | End: 2018-09-06 | Stop reason: SDUPTHER

## 2018-08-06 DIAGNOSIS — Z12.11 SPECIAL SCREENING FOR MALIGNANT NEOPLASMS, COLON: Primary | ICD-10-CM

## 2018-08-08 ENCOUNTER — TELEPHONE (OUTPATIENT)
Dept: GASTROENTEROLOGY | Facility: MEDICAL CENTER | Age: 63
End: 2018-08-08

## 2018-08-08 NOTE — TELEPHONE ENCOUNTER
Devin Snellen from [employer] called looking for pt ins info, spoke to pt she now has medicare  It is now added to pts chart   Pt also had questions regarding billing, pt was given alayna phone #

## 2018-08-09 NOTE — TELEPHONE ENCOUNTER
I spoke with pt she's wanting to reschedule procedure in Sept when her insurance is in effect  Pt only wants to be scheduled at [facility]  She's aware as of Sept Dr Camarena will no longer go to [facility]  She's ok seeing any physician in the practice   Procedure rescheduled with Dr Ge at [facility] on 9/17/2018

## 2018-09-02 ENCOUNTER — APPOINTMENT (EMERGENCY)
Dept: RADIOLOGY | Facility: HOSPITAL | Age: 63
End: 2018-09-02
Payer: MEDICARE

## 2018-09-02 ENCOUNTER — APPOINTMENT (EMERGENCY)
Dept: CT IMAGING | Facility: HOSPITAL | Age: 63
End: 2018-09-02
Payer: MEDICARE

## 2018-09-02 ENCOUNTER — HOSPITAL ENCOUNTER (INPATIENT)
Facility: HOSPITAL | Age: 63
LOS: 1 days | Discharge: HOME | DRG: 552 | End: 2018-09-03
Attending: SURGERY | Admitting: SURGERY
Payer: MEDICARE

## 2018-09-02 ENCOUNTER — HOSPITAL ENCOUNTER (EMERGENCY)
Facility: HOSPITAL | Age: 63
End: 2018-09-02
Payer: MEDICARE

## 2018-09-02 VITALS
HEART RATE: 79 BPM | HEIGHT: 66 IN | TEMPERATURE: 98.2 F | WEIGHT: 125 LBS | OXYGEN SATURATION: 100 % | DIASTOLIC BLOOD PRESSURE: 82 MMHG | BODY MASS INDEX: 20.09 KG/M2 | RESPIRATION RATE: 16 BRPM | SYSTOLIC BLOOD PRESSURE: 194 MMHG

## 2018-09-02 DIAGNOSIS — S70.01XA CONTUSION OF RIGHT HIP, INITIAL ENCOUNTER: ICD-10-CM

## 2018-09-02 DIAGNOSIS — S32.010A COMPRESSION FRACTURE OF L1 LUMBAR VERTEBRA, CLOSED, INITIAL ENCOUNTER (HCC): Primary | ICD-10-CM

## 2018-09-02 DIAGNOSIS — S32.020A CLOSED COMPRESSION FRACTURE OF SECOND LUMBAR VERTEBRA, INITIAL ENCOUNTER: Primary | ICD-10-CM

## 2018-09-02 DIAGNOSIS — S63.502A WRIST SPRAIN, LEFT, INITIAL ENCOUNTER: ICD-10-CM

## 2018-09-02 LAB
ALBUMIN SERPL BCP-MCNC: 4.7 G/DL (ref 3.5–5.7)
ALP SERPL-CCNC: 54 U/L (ref 55–165)
ALT SERPL W P-5'-P-CCNC: 15 U/L (ref 7–52)
ANION GAP SERPL CALCULATED.3IONS-SCNC: 10 MMOL/L (ref 4–13)
APTT PPP: 26 SECONDS (ref 24–36)
AST SERPL W P-5'-P-CCNC: 26 U/L (ref 13–39)
BASOPHILS # BLD AUTO: 0 THOUSANDS/ΜL (ref 0–0.1)
BASOPHILS NFR BLD AUTO: 0 % (ref 0–2)
BILIRUB SERPL-MCNC: 0.6 MG/DL (ref 0.2–1)
BUN SERPL-MCNC: 12 MG/DL (ref 7–25)
CALCIUM SERPL-MCNC: 9.4 MG/DL (ref 8.6–10.5)
CHLORIDE SERPL-SCNC: 100 MMOL/L (ref 98–107)
CO2 SERPL-SCNC: 27 MMOL/L (ref 21–31)
CREAT SERPL-MCNC: 0.76 MG/DL (ref 0.6–1.2)
EOSINOPHIL # BLD AUTO: 0.1 THOUSAND/ΜL (ref 0–0.61)
EOSINOPHIL NFR BLD AUTO: 1 % (ref 0–5)
ERYTHROCYTE [DISTWIDTH] IN BLOOD BY AUTOMATED COUNT: 12.7 % (ref 11.5–14.5)
GFR SERPL CREATININE-BSD FRML MDRD: 83 ML/MIN/1.73SQ M
GLUCOSE SERPL-MCNC: 111 MG/DL (ref 65–99)
HCT VFR BLD AUTO: 34.7 % (ref 34.8–46.1)
HGB BLD-MCNC: 11.9 G/DL (ref 12–16)
INR PPP: 1.06 (ref 0.9–1.5)
LYMPHOCYTES # BLD AUTO: 0.8 THOUSANDS/ΜL (ref 0.6–4.47)
LYMPHOCYTES NFR BLD AUTO: 10 % (ref 21–51)
MCH RBC QN AUTO: 30.7 PG (ref 26–34)
MCHC RBC AUTO-ENTMCNC: 34.3 G/DL (ref 31–37)
MCV RBC AUTO: 89 FL (ref 81–99)
MONOCYTES # BLD AUTO: 0.6 THOUSAND/ΜL (ref 0.17–1.22)
MONOCYTES NFR BLD AUTO: 8 % (ref 2–12)
NEUTROPHILS # BLD AUTO: 6 THOUSANDS/ΜL (ref 1.4–6.5)
NEUTS SEG NFR BLD AUTO: 80 % (ref 42–75)
NRBC BLD AUTO-RTO: 0 /100 WBCS
PLATELET # BLD AUTO: 271 THOUSANDS/UL (ref 149–390)
PMV BLD AUTO: 6.7 FL (ref 8.6–11.7)
POTASSIUM SERPL-SCNC: 4.4 MMOL/L (ref 3.5–5.5)
PROT SERPL-MCNC: 6.8 G/DL (ref 6.4–8.9)
PROTHROMBIN TIME: 12.3 SECONDS (ref 10.1–12.9)
RBC # BLD AUTO: 3.89 MILLION/UL (ref 3.9–5.2)
SODIUM SERPL-SCNC: 137 MMOL/L (ref 134–143)
WBC # BLD AUTO: 7.5 THOUSAND/UL (ref 4.8–10.8)

## 2018-09-02 PROCEDURE — 85025 COMPLETE CBC W/AUTO DIFF WBC: CPT

## 2018-09-02 PROCEDURE — 96374 THER/PROPH/DIAG INJ IV PUSH: CPT

## 2018-09-02 PROCEDURE — 80053 COMPREHEN METABOLIC PANEL: CPT

## 2018-09-02 PROCEDURE — 96375 TX/PRO/DX INJ NEW DRUG ADDON: CPT

## 2018-09-02 PROCEDURE — 73502 X-RAY EXAM HIP UNI 2-3 VIEWS: CPT

## 2018-09-02 PROCEDURE — 99285 EMERGENCY DEPT VISIT HI MDM: CPT

## 2018-09-02 PROCEDURE — 99222 1ST HOSP IP/OBS MODERATE 55: CPT | Performed by: NEUROLOGICAL SURGERY

## 2018-09-02 PROCEDURE — 36415 COLL VENOUS BLD VENIPUNCTURE: CPT

## 2018-09-02 PROCEDURE — 72131 CT LUMBAR SPINE W/O DYE: CPT

## 2018-09-02 PROCEDURE — 85730 THROMBOPLASTIN TIME PARTIAL: CPT

## 2018-09-02 PROCEDURE — 99222 1ST HOSP IP/OBS MODERATE 55: CPT | Performed by: SURGERY

## 2018-09-02 PROCEDURE — 73110 X-RAY EXAM OF WRIST: CPT

## 2018-09-02 PROCEDURE — 85610 PROTHROMBIN TIME: CPT

## 2018-09-02 PROCEDURE — 96376 TX/PRO/DX INJ SAME DRUG ADON: CPT

## 2018-09-02 RX ORDER — METHOCARBAMOL 500 MG/1
500 TABLET, FILM COATED ORAL EVERY 6 HOURS PRN
Status: DISCONTINUED | OUTPATIENT
Start: 2018-09-02 | End: 2018-09-03

## 2018-09-02 RX ORDER — METOPROLOL TARTRATE 5 MG/5ML
5 INJECTION INTRAVENOUS EVERY 6 HOURS PRN
Status: DISCONTINUED | OUTPATIENT
Start: 2018-09-02 | End: 2018-09-03 | Stop reason: HOSPADM

## 2018-09-02 RX ORDER — OXYCODONE HYDROCHLORIDE 5 MG/1
5 TABLET ORAL EVERY 4 HOURS PRN
Status: DISCONTINUED | OUTPATIENT
Start: 2018-09-02 | End: 2018-09-03 | Stop reason: HOSPADM

## 2018-09-02 RX ORDER — ALPRAZOLAM 0.5 MG/1
0.5 TABLET ORAL DAILY
Status: DISCONTINUED | OUTPATIENT
Start: 2018-09-03 | End: 2018-09-03 | Stop reason: HOSPADM

## 2018-09-02 RX ORDER — HEPARIN SODIUM 5000 [USP'U]/ML
5000 INJECTION, SOLUTION INTRAVENOUS; SUBCUTANEOUS EVERY 8 HOURS SCHEDULED
Status: DISCONTINUED | OUTPATIENT
Start: 2018-09-02 | End: 2018-09-03 | Stop reason: HOSPADM

## 2018-09-02 RX ORDER — ACETAMINOPHEN 325 MG/1
650 TABLET ORAL EVERY 6 HOURS PRN
Status: DISCONTINUED | OUTPATIENT
Start: 2018-09-02 | End: 2018-09-03

## 2018-09-02 RX ORDER — LISINOPRIL 20 MG/1
20 TABLET ORAL DAILY
Status: DISCONTINUED | OUTPATIENT
Start: 2018-09-03 | End: 2018-09-03 | Stop reason: HOSPADM

## 2018-09-02 RX ORDER — ONDANSETRON 2 MG/ML
4 INJECTION INTRAMUSCULAR; INTRAVENOUS EVERY 6 HOURS PRN
Status: DISCONTINUED | OUTPATIENT
Start: 2018-09-02 | End: 2018-09-03 | Stop reason: HOSPADM

## 2018-09-02 RX ORDER — OXYCODONE HYDROCHLORIDE 10 MG/1
10 TABLET ORAL EVERY 4 HOURS PRN
Status: DISCONTINUED | OUTPATIENT
Start: 2018-09-02 | End: 2018-09-03 | Stop reason: HOSPADM

## 2018-09-02 RX ORDER — FENTANYL CITRATE 50 UG/ML
50 INJECTION, SOLUTION INTRAMUSCULAR; INTRAVENOUS ONCE
Status: COMPLETED | OUTPATIENT
Start: 2018-09-02 | End: 2018-09-02

## 2018-09-02 RX ORDER — METOPROLOL SUCCINATE 50 MG/1
50 TABLET, EXTENDED RELEASE ORAL DAILY
Status: DISCONTINUED | OUTPATIENT
Start: 2018-09-03 | End: 2018-09-03 | Stop reason: HOSPADM

## 2018-09-02 RX ORDER — LANOLIN ALCOHOL/MO/W.PET/CERES
3 CREAM (GRAM) TOPICAL
Status: DISCONTINUED | OUTPATIENT
Start: 2018-09-02 | End: 2018-09-03 | Stop reason: HOSPADM

## 2018-09-02 RX ADMIN — FENTANYL CITRATE 50 MCG: 50 INJECTION, SOLUTION INTRAMUSCULAR; INTRAVENOUS at 12:05

## 2018-09-02 RX ADMIN — OXYCODONE HYDROCHLORIDE 10 MG: 10 TABLET ORAL at 21:12

## 2018-09-02 RX ADMIN — HYDROMORPHONE HYDROCHLORIDE 1 MG: 1 INJECTION, SOLUTION INTRAMUSCULAR; INTRAVENOUS; SUBCUTANEOUS at 15:24

## 2018-09-02 RX ADMIN — METHOCARBAMOL 500 MG: 500 TABLET ORAL at 19:33

## 2018-09-02 RX ADMIN — HYDROMORPHONE HYDROCHLORIDE 1 MG: 1 INJECTION, SOLUTION INTRAMUSCULAR; INTRAVENOUS; SUBCUTANEOUS at 13:24

## 2018-09-02 RX ADMIN — HYDROMORPHONE HYDROCHLORIDE 0.5 MG: 1 INJECTION, SOLUTION INTRAMUSCULAR; INTRAVENOUS; SUBCUTANEOUS at 23:13

## 2018-09-02 RX ADMIN — HEPARIN SODIUM 5000 UNITS: 5000 INJECTION, SOLUTION INTRAVENOUS; SUBCUTANEOUS at 23:19

## 2018-09-02 NOTE — EMTALA/ACUTE CARE TRANSFER
[address] [facility] [address] [place]  [facility] [address]  [facility] [address]  [phone]  Dept: [phone]      EMTALA TRANSFER CONSENT    NAME Zelda Gonzales                                         1955                              MRN [id number]    I have been informed of my rights regarding examination, treatment, and transfer   by Dr An Alicea MD    Benefits: Specialized equipment and/or services available at the receiving facility (Include comment)________________________ (Trauma)    Risks: Potential for delay in receiving treatment, Potential deterioration of medical condition, Loss of IV, Increased discomfort during transfer, Possible worsening of condition or death during transfer      Transfer Request   I acknowledge that my medical condition has been evaluated and explained to me by the emergency department physician or other qualified medical person and/or my attending physician who has recommended and offered to me further medical examination and treatment  I understand the Hospital's obligation with respect to the treatment and stabilization of my emergency medical condition  I nevertheless request to be transferred  I release the Hospital, the doctor, and any other persons caring for me from all responsibility or liability for any injury or ill effects that may result from my transfer and agree to accept all responsibility for the consequences of my choice to transfer, rather than receive stabilizing treatment at the Hospital  I understand that because the transfer is my request, my insurance may not provide reimbursement for the services  The Hospital will assist and direct me and my family in how to make arrangements for transfer, but the hospital is not liable for any fees charged by the transport service    In spite of this understanding, I refuse to consent to further medical examination and treatment which has been offered to me, and request transfer to Accepting Facility Name, [address] [place], [place]  I authorize the performance of emergency medical procedures and treatments upon me in both transit and upon arrival at the receiving facility  Additionally, I authorize the release of any and all medical records to the receiving facility and request they be transported with me, if possible  I authorize the performance of emergency medical procedures and treatments upon me in both transit and upon arrival at the receiving facility  Additionally, I authorize the release of any and all medical records to the receiving facility and request they be transported with me, if possible  I understand that the safest mode of transportation during a medical emergency is an ambulance and that the Hospital advocates the use of this mode of transport  Risks of traveling to the receiving facility by car, including absence of medical control, life sustaining equipment, such as oxygen, and medical personnel has been explained to me and I fully understand them  (LUIZ CORRECT BOX BELOW)  [ X ]  I consent to the stated transfer and to be transported by ambulance/helicopter  [  ]  I consent to the stated transfer, but refuse transportation by ambulance and accept full responsibility for my transportation by car  I understand the risks of non-ambulance transfers and I exonerate the Hospital and its staff from any deterioration in my condition that results from this refusal     X___________________________________________    DATE  18  TIME________  Signature of patient or legally responsible individual signing on patient behalf           RELATIONSHIP TO PATIENT_________________________          Provider Certification    NAME Kaye Chen                                         1955                              MRN [id number]    A medical screening exam was performed on the above named patient    Based on the examination:    Condition Necessitating Transfer The primary encounter diagnosis was Compression fracture of L1 lumbar vertebra, closed, initial encounter ([facility] [address] )  Diagnoses of Wrist sprain, left, initial encounter and Contusion of right hip, initial encounter were also pertinent to this visit  Patient Condition: The patient has been stabilized such that within reasonable medical probability, no material deterioration of the patient condition or the condition of the unborn child(denilson) is likely to result from the transfer    Reason for Transfer: Level of Care needed not available at this facility    Transfer Requirements: [facility] [address]   · Space available and qualified personnel available for treatment as acknowledged by Raisa Ocampo [phone]  · Agreed to accept transfer and to provide appropriate medical treatment as acknowledged by       Dr Kristal Ocampo  · Appropriate medical records of the examination and treatment of the patient are provided at the time of transfer   [address] [place] [address] [address] _______  · Transfer will be performed by qualified personnel from [address] [place] [address]  and appropriate transfer equipment as required, including the use of necessary and appropriate life support measures      Provider Certification: I have examined the patient and explained the following risks and benefits of being transferred/refusing transfer to the patient/family:  General risk, such as traffic hazards, adverse weather conditions, rough terrain or turbulence, possible failure of equipment (including vehicle or aircraft), or consequences of actions of persons outside the control of the transport personnel      Based on these reasonable risks and benefits to the patient and/or the unborn child(denilson), and based upon the information available at the time of the patients examination, I certify that the medical benefits reasonably to be expected from the provision of appropriate medical treatments at another medical facility outweigh the increasing risks, if any, to the individuals medical condition, and in the case of labor to the unborn child, from effecting the transfer      X____________________________________________ DATE 09/02/18        TIME_______      ORIGINAL - SEND TO MEDICAL RECORDS   COPY - SEND WITH PATIENT DURING TRANSFER

## 2018-09-02 NOTE — ED PROVIDER NOTES
History  Chief Complaint   Patient presents with    Fall     Rogelio Carrasco is a 68-year-old female who was brought to the emergency department by ambulance crew due to moderate to severe low back pain and right hip pain sustained after a fall today  Patient denies loss of consciousness or dizziness  Patient was walking out of a house where there was an open house today  Patient denies weakness or numbness of lower extremities  Patient also denies urinary or bowel incontinence  Patient also complains of pain on the left lamonte after she attempted to break her fall with her left arm  History provided by:  Patient and EMS personnel   used: No    Fall   Mechanism of injury: fall    Injury location:  Pelvis  Pelvic injury location:  R hip  Incident location:  Home  Time since incident: Today    Arrived directly from scene: yes    Fall:     Fall occurred:  Walking    Height of fall:  Unable to specify    Impact surface:  Hard floor    Point of impact:  Back, buttocks and outstretched arms    Entrapped after fall: no    Protective equipment: none    Suspicion of alcohol use: no    Suspicion of drug use: no    Tetanus status:  Unknown  Prior to arrival data:     Bystander interventions:  None    Patient ambulatory at scene: no      Blood loss:  None    Responsiveness at scene:  Alert    Orientation at scene:  Person, place, situation and time    Loss of consciousness: no      Amnesic to event: no      Airway interventions:  None    Breathing interventions:  None    IV access status:  Established    IO access:  None    Fluids administered:  None    Cardiac interventions:  None    Medications administered:  None    Immobilization:  None    Airway condition since incident:  Stable    Breathing condition since incident:  Stable    Circulation condition since incident:  Stable    Mental status condition since incident:  Stable    Disability condition since incident:  Stable  Associated symptoms: back pain    Associated symptoms: no abdominal pain, no blindness, no chest pain, no difficulty breathing, no headaches, no hearing loss, no loss of consciousness, no nausea, no neck pain, no seizures and no vomiting    Risk factors: no anticoagulation therapy and no steroid use        Prior to Admission Medications   Prescriptions Last Dose Informant Patient Reported? Taking? ALPRAZolam (XANAX) 0 5 mg tablet   No Yes   Sig: Take 1 tablet (0 5 mg total) by mouth daily for 90 days   aspirin 81 MG tablet   Yes Yes   Sig: Take 81 mg by mouth daily   lisinopril (ZESTRIL) 20 mg tablet   No Yes   Sig: Take 1 tablet (20 mg total) by mouth daily   metoprolol succinate (TOPROL-XL) 50 mg 24 hr tablet   No Yes   Sig: Take 1 tablet (50 mg total) by mouth daily      Facility-Administered Medications: None       Past Medical History:   Diagnosis Date    Hypertension        History reviewed  No pertinent surgical history  Family History   Problem Relation Age of Onset    Coronary artery disease Mother         early    Coronary artery disease Father         early     I have reviewed and agree with the history as documented  Social History   Substance Use Topics    Smoking status: Never Smoker    Smokeless tobacco: Never Used      Comment: former smoker per bill,pack a day for about 20 years, quit 10 years ago    Alcohol use 1 2 oz/week     2 Glasses of wine per week      Comment: daily        Review of Systems   Constitutional: Negative  HENT: Negative for hearing loss  Eyes: Negative  Negative for blindness  Respiratory: Negative  Cardiovascular: Negative for chest pain  Gastrointestinal: Negative for abdominal pain, nausea and vomiting  Endocrine: Negative  Genitourinary: Negative  Musculoskeletal: Positive for arthralgias and back pain  Negative for neck pain  Skin: Negative  Allergic/Immunologic: Negative  Neurological: Negative for seizures, loss of consciousness and headaches  Hematological: Negative  Psychiatric/Behavioral: Negative  Physical Exam  Physical Exam   Constitutional: She is oriented to person, place, and time  She appears well-developed and well-nourished  No distress  HENT:   Head: Normocephalic and atraumatic  Right Ear: External ear normal    Left Ear: External ear normal    Nose: Nose normal    Mouth/Throat: Oropharynx is clear and moist  No oropharyngeal exudate  Eyes: Conjunctivae and EOM are normal  Pupils are equal, round, and reactive to light  Right eye exhibits no discharge  Left eye exhibits no discharge  No scleral icterus  Neck: Normal range of motion  Neck supple  No tracheal deviation present  No thyromegaly present  Cardiovascular: Normal rate, regular rhythm, normal heart sounds and intact distal pulses  Pulmonary/Chest: Effort normal and breath sounds normal  No respiratory distress  Abdominal: Soft  Bowel sounds are normal  She exhibits no distension  Musculoskeletal: She exhibits tenderness  She exhibits no edema or deformity  Tenderness noted on the lumbosacral area  Lymphadenopathy:     She has no cervical adenopathy  Neurological: She is alert and oriented to person, place, and time  No cranial nerve deficit or sensory deficit  She exhibits normal muscle tone  Coordination normal    Skin: Skin is warm and dry  No rash noted  She is not diaphoretic  No erythema  No pallor  Psychiatric: She has a normal mood and affect  Her behavior is normal  Judgment and thought content normal    Nursing note and vitals reviewed        Vital Signs  ED Triage Vitals [09/02/18 1136]   Temperature Pulse Respirations Blood Pressure SpO2   98 2 °F (36 8 °C) 79 16 147/67 100 %      Temp Source Heart Rate Source Patient Position - Orthostatic VS BP Location FiO2 (%)   Temporal Monitor Lying Right arm --      Pain Score       7           Vitals:    09/02/18 1136   BP: 147/67   Pulse: 79   Patient Position - Orthostatic VS: Lying Visual Acuity      ED Medications  Medications    EMS REPLENISHMENT MED ( Does not apply Given to EMS 9/2/18 1134)   fentanyl citrate (PF) 100 MCG/2ML 50 mcg (50 mcg Intravenous Given 9/2/18 1205)   HYDROmorphone (DILAUDID) injection 1 mg (1 mg Intravenous Given 9/2/18 1324)   HYDROmorphone (DILAUDID) injection 1 mg (1 mg Intravenous Given 9/2/18 1524)       Diagnostic Studies  Results Reviewed     Procedure Component Value Units Date/Time    APTT [[id number]]  (Normal) Collected:  09/02/18 1147    Lab Status:  Final result Specimen:  Blood from Arm, Left Updated:  09/02/18 1222     PTT 26 seconds     Protime-INR [[id number]]  (Normal) Collected:  09/02/18 1147    Lab Status:  Final result Specimen:  Blood from Arm, Left Updated:  09/02/18 1221     Protime 12 3 seconds      INR 1 06    Comprehensive metabolic panel [[id number]]  (Abnormal) Collected:  09/02/18 1147    Lab Status:  Final result Specimen:  Blood from Arm, Left Updated:  09/02/18 1218     Sodium 137 mmol/L      Potassium 4 4 mmol/L      Chloride 100 mmol/L      CO2 27 mmol/L      ANION GAP 10 mmol/L      BUN 12 mg/dL      Creatinine 0 76 mg/dL      Glucose 111 (H) mg/dL      Calcium 9 4 mg/dL      AST 26 U/L      ALT 15 U/L      Alkaline Phosphatase 54 (L) U/L      Total Protein 6 8 g/dL      Albumin 4 7 g/dL      Total Bilirubin 0 60 mg/dL      eGFR 83 ml/min/1 73sq m     Narrative:         National Kidney Disease Education Program recommendations are as follows:  GFR calculation is accurate only with a steady state creatinine  Chronic Kidney disease less than 60 ml/min/1 73 sq  meters  Kidney failure less than 15 ml/min/1 73 sq  meters      CBC and differential [[id number]]  (Abnormal) Collected:  09/02/18 1147    Lab Status:  Final result Specimen:  Blood from Arm, Left Updated:  09/02/18 1205     WBC 7 50 Thousand/uL      RBC 3 89 (L) Million/uL      Hemoglobin 11 9 (L) g/dL      Hematocrit 34 7 (L) %      MCV 89 fL      MCH 30 7 pg      MCHC 34 3 g/dL      RDW 12 7 %      MPV 6 7 (L) fL      Platelets 020 Thousands/uL      nRBC 0 /100 WBCs      Neutrophils Relative 80 (H) %      Lymphocytes Relative 10 (L) %      Monocytes Relative 8 %      Eosinophils Relative 1 %      Basophils Relative 0 %      Neutrophils Absolute 6 00 Thousands/µL      Lymphocytes Absolute 0 80 Thousands/µL      Monocytes Absolute 0 60 Thousand/µL      Eosinophils Absolute 0 10 Thousand/µL      Basophils Absolute 0 00 Thousands/µL                  CT lumbar spine without contrast   Final Result by Aleda Cheadle (09/02 1309)   Moderate superior endplate compression of L2 that is felt to be   acute/subacute with slight retropulsion of fragment into the canal not   producing significant stenosis  No other clearly significant/acute   abnormality is seen on noncontrast CT of the lumbar spine  Signed by Aleda Cheadle, MD      XR hip/pelv 2-3 vws right   Final Result by Berna Schwarz DO (09/02 4073)   No acute osseous abnormality of the pelvis or right hip  Signed by Berna Schwarz DO      XR wrist 3+ views LEFT   Final Result by Berna Schwarz DO (09/02 3538)   No acute osseous abnormality of the left wrist          Signed by Berna Schwarz DO                 Procedures  Orthopedic Injury  Date/Time: 9/2/2018 3:24 PM  Performed by: Lorne Arevalo  Authorized by: Lorne Arevalo     Patient Location:  ED  Other Assisting Provider: No    Verbal consent obtained?: Yes    Written consent obtained?: No    Emergent situation    Risks and benefits: Risks, benefits and alternatives were discussed    Consent given by:  Patient  Patient states understanding of procedure being performed: Yes    Site marked: Yes    Radiology Images displayed and confirmed   If images not available, report reviewed: Yes    Patient identity confirmed:  Verbally with patient, arm band, provided demographic data and hospital-assigned identification number  Time out: Immediately prior to the procedure a time out was called    Injury location:  Wrist  Location details:  Left wrist  Injury type: Soft tissue  Neurovascular status: Neurovascularly intact    Distal perfusion: normal    Neurological function: normal    Range of motion: normal    Local anesthesia used?: No    General anesthesia used?: No    Skeletal traction used?: No    Immobilization:  Splint  Splint type: Velcro splint  Neurovascular status: Neurovascularly intact    Distal perfusion: normal    Neurological function: normal    Range of motion: normal    Patient tolerance:  Patient tolerated the procedure well with no immediate complications           Phone Contacts  ED Phone Contact    ED Course  ED Course as of Sep 02 1532   Sun Sep 02, 2018   1332 Patient is resting comfortably on the stretcher  I discussed with her the results of her imaging studies including the CT scan of the lumbar spines, x-rays of right hip and left wrist     1414 I discussed the results of her imaging studies with her family at the bedside  I informed her that I planned to get her transferred to [facility] for further treatment  Patient agreed                                  MDM  CritCare Time    Disposition  Final diagnoses:   Compression fracture of L1 lumbar vertebra, closed, initial encounter ([facility] [address] )   Wrist sprain, left, initial encounter   Contusion of right hip, initial encounter     Time reflects when diagnosis was documented in both MDM as applicable and the Disposition within this note     Time User Action Codes Description Comment    9/2/2018  1:59 PM Bryanna Sherman Add [S32 010A] Compression fracture of L1 lumbar vertebra, closed, initial encounter ([facility] [address] )     9/2/2018  1:59 PM Leonardo Farnsworth Add [S63 502A] Wrist sprain, left, initial encounter     9/2/2018  1:59 PM Bryanna Sherman Add [S70 [id number]] Contusion of right hip, initial encounter       ED Disposition     ED Disposition Condition Comment    Transfer to Another Facility-In Network        MD Documentation      Most Recent Value   Patient Condition  The patient has been stabilized such that within reasonable medical probability, no material deterioration of the patient condition or the condition of the unborn child(denilson) is likely to result from the transfer   Reason for Transfer  Level of Care needed not available at this facility   Benefits of Transfer  Specialized equipment and/or services available at the receiving facility (Include comment)________________________ [Trauma]   Risks of Transfer  Potential for delay in receiving treatment, Potential deterioration of medical condition, Loss of IV, Increased discomfort during transfer, Possible worsening of condition or death during transfer   Accepting Physician  Jarvis Sims Name, [address]    (Name & Tel number)  [place] [phone]   Transported by (Company and Unit #)  [facility]   Provider Certification  General risk, such as traffic hazards, adverse weather conditions, rough terrain or turbulence, possible failure of equipment (including vehicle or aircraft), or consequences of actions of persons outside the control of the transport personnel      RN Documentation      [facility] [address] [facility] Name, Edyta Hughes   Bed Assignment  ED    (Name & Tel number)  [facility] [phone]   Transport Mode  Ambulance   Transported by [employer] and Unit #)  [facility]   Level of Care  Advanced life support   Copies of Medical Records Sent  History and Physical   Patient Belongings Disposition  Sent with patient   Transfer Date  09/02/18      Follow-up Information    None         Patient's Medications   Discharge Prescriptions    No medications on file     No discharge procedures on file      ED Provider  Electronically Signed by           Cathy Garibay MD  09/02/18 8728

## 2018-09-02 NOTE — CONSULTS
Consultation - Neurosurgery   Debi Grayson 70 y o  female MRN: [id number]  Unit/Bed#: ED 17 Encounter: [id number]      Assessment/Plan     Assessment:  38-year-old woman with L2 compression fracture, neurologically intact  TLICS 1  No surgical intervention is anticipated  Recommend upright plain films in LSO brace and outpatient follow-up  History, physical examination and imaging personally reviewed with the patient and her  in ED room 17  Explained that this is likely stable fracture which will not require surgical intervention  Discussed natural history for gradual improvement in lower back pain over the next 2-3 months  Recommend outpatient follow-up  If the patient develops increasing back pain or new pain, weakness or numbness in her legs or difficulties with bowel bladder function or changing perineal sensation and she is to return to the emergency department  All other questions were answered to their satisfaction and they are in agreement with this course of action  This was discussed with the trauma team     Plan:  1  Fit with LSO brace and obtain upright plain films  2   Can follow up as an outpatient with trauma or neurosurgery  History of Present Illness     HPI: Debi Grayson is a 70y o  year old female who presents with fell onto her buttocks when going down a ramp  Immediate lower back and left upper iliac crest pain  Presented to the emergency department  Denies any pain, weakness or numbness in her legs or difficulties with bowel bladder function or changing perineal sensation  CT demonstrates L2 compression fracture  Neurosurgical service is consulted  Inpatient consult to Neurosurgery  Consult performed by: Marek Frances  Consult ordered by: Sebastien Hopkins          Review of Systems   Constitutional: Negative  HENT: Negative  Eyes: Negative  Respiratory: Negative  Cardiovascular: Negative  Gastrointestinal: Negative  Endocrine: Negative  Genitourinary: Negative  Musculoskeletal: Positive for back pain  Skin: Negative  Allergic/Immunologic: Negative  Neurological: Negative  Hematological: Negative  Psychiatric/Behavioral: Negative  Historical Information   Past Medical History:   Diagnosis Date    Hypertension      History reviewed  No pertinent surgical history  History   Alcohol Use    1 2 oz/week    2 Glasses of wine per week     Comment: daily     History   Drug Use No     History   Smoking Status    Never Smoker   Smokeless Tobacco    Never Used     Comment: former smoker per allscripts,pack a day for about 20 years, quit 10 years ago     Family History   Problem Relation Age of Onset    Coronary artery disease Mother         early   [facility] Coronary artery disease Father         early       Meds/Allergies   all current active meds have been reviewed, current meds:   Current Facility-Administered Medications   Medication Dose Route Frequency    acetaminophen (TYLENOL) tablet 650 mg  650 mg Oral Q6H PRN    [START ON 9/3/2018] ALPRAZolam (XANAX) tablet 0 5 mg  0 5 mg Oral Daily    heparin (porcine) subcutaneous injection 5,000 Units  5,000 Units Subcutaneous Q8H [facility]    HYDROmorphone (DILAUDID) injection 0 5 mg  0 5 mg Intravenous Q4H PRN    [START ON 9/3/2018] lisinopril (ZESTRIL) tablet 20 mg  20 mg Oral Daily    melatonin tablet 3 mg  3 mg Oral HS PRN    methocarbamol (ROBAXIN) tablet 500 mg  500 mg Oral Q6H PRN    metoprolol (LOPRESSOR) injection 5 mg  5 mg Intravenous Q6H PRN    [START ON 9/3/2018] metoprolol succinate (TOPROL-XL) 24 hr tablet 50 mg  50 mg Oral Daily    ondansetron (ZOFRAN) injection 4 mg  4 mg Intravenous Q6H PRN    oxyCODONE (ROXICODONE) immediate release tablet 10 mg  10 mg Oral Q4H PRN    oxyCODONE (ROXICODONE) IR tablet 5 mg  5 mg Oral Q4H PRN    and PTA meds:   Prior to Admission Medications   Prescriptions Last Dose Informant Patient Reported? Taking?    ALPRAZolam (XANAX) 0 5 mg tablet   No Yes   Sig: Take 1 tablet (0 5 mg total) by mouth daily for 90 days   aspirin 81 MG tablet   Yes Yes   Sig: Take 81 mg by mouth daily   lisinopril (ZESTRIL) 20 mg tablet   No Yes   Sig: Take 1 tablet (20 mg total) by mouth daily   metoprolol succinate (TOPROL-XL) 50 mg 24 hr tablet   No Yes   Sig: Take 1 tablet (50 mg total) by mouth daily      Facility-Administered Medications: None     Allergies   Allergen Reactions    Penicillins        Objective   No intake or output data in the 24 hours ending 09/02/18 1933    Physical Exam   Constitutional: She is oriented to person, place, and time  She appears well-developed and well-nourished  No distress  Musculoskeletal:        Lumbar back: She exhibits tenderness (No midline lumbar tenderness to palpation  Some tenderness over left greater iliac crest   Right paraspinal muscular tenderness to palpation  )  Neurological: She is alert and oriented to person, place, and time  Reflex Scores:       Patellar reflexes are 1+ on the right side and 1+ on the left side  Achilles reflexes are 1+ on the right side and 1+ on the left side  Skin: Skin is warm and dry  Psychiatric: She has a normal mood and affect  Her behavior is normal  Thought content normal    Vitals reviewed  Neurologic Exam     Mental Status   Oriented to person, place, and time  Attention: normal    Level of consciousness: alert    Motor Exam   Muscle bulk: normal  Right leg tone: normal  Left leg tone: normal5/5 power in lower extremities       Sensory Exam   Right leg light touch: normal  Left leg light touch: normal  Right leg pinprick: normal  Left leg pinprick: normal    Gait, Coordination, and Reflexes     Reflexes   Right patellar: 1+  Left patellar: 1+  Right achilles: 1+  Left achilles: 1+  Right plantar: normal  Left plantar: normal  Right ankle clonus: absent  Left ankle clonus: absent      Vitals:Blood pressure (!) 186/83, pulse 68, temperature 98 3 °F (36 8 °C), temperature source Oral, resp  rate (!) 25, SpO2 96 %  ,There is no height or weight on file to calculate BMI  Lab Results:   I have personally reviewed pertinent results  Lab Results   Component Value Date    WBC 7 50 09/02/2018    HGB 11 9 (L) 09/02/2018    HCT 34 7 (L) 09/02/2018    MCV 89 09/02/2018     09/02/2018    MCH 30 7 09/02/2018    MCHC 34 3 09/02/2018    RDW 12 7 09/02/2018    MPV 6 7 (L) 09/02/2018    NRBC 0 09/02/2018     09/02/2018     09/02/2018    CO2 27 09/02/2018    BUN 12 09/02/2018    CREATININE 0 76 09/02/2018    CALCIUM 9 4 09/02/2018    AST 26 09/02/2018    ALT 15 09/02/2018    ALKPHOS 54 (L) 09/02/2018    EGFR 83 09/02/2018    INR 1 06 09/02/2018       Imaging Studies: I have personally reviewed pertinent reports  and I have personally reviewed pertinent films in PACS     CT scan lumbar spine without contrast dated September 2nd, 2018  Mild superior endplate compression fracture of L2  No malalignment  Minimal retropulsion  No evidence of ligamentous injury  Mild degenerative changes throughout the lumbar spine  EKG, Pathology, and Other Studies: I have personally reviewed pertinent reports  VTE Prophylaxis: Sequential compression device Edmond Ontiveros     Code Status: Level 1 - Full Code  Advance Directive and Living Will:      Power of :    POLST:      Counseling / Coordination of Care  Counseling/Coordination of Care: Total floor / unit time spent today 20 minutes  Greater than 50% of total time was spent with the patient and / or family counseling and / or coordination of care  A description of the counseling / coordination of care: see assessment and plan documentation above for description

## 2018-09-02 NOTE — H&P
H&P Exam - Trauma   Loreto Garcia 70 y o  female MRN: [id number]  Unit/Bed#: ED 17 Encounter: [id number]    Assessment/Plan   Trauma Alert: Evaluation  Model of Arrival: Ambulance  Trauma Team: Attending Dr Yani Milian and Residents Dr Rita Cervantes  Consultants: Neurosurgery    Trauma Active Problems:   L2 compression fracture w/ slight retropulsion    Trauma Plan:   Neurosurgery consult  Lumbar precautions  LSO brace  Neurochecks q2  Upright lumbar xrays in AM    Chief Complaint:   Left lumbar back pain    History of Present Illness   HPI:  Pedro Artis is a 70 y o  female who presents as a trauma transfer s/p slip and fall earlier in day  MHx includes HTN  She was seen at [facility]  CT lumbar spine showing L2 compression fracture with slight retropulsion  L forearm Xray normal  She was transferred here  She complains of some lower back pain that has improved since her injury and left forearm pain  Mechanism:Fall    Review of Systems   Constitutional: Negative for chills and fever  Respiratory: Negative for chest tightness and shortness of breath  Cardiovascular: Negative for chest pain and palpitations  Gastrointestinal: Negative for abdominal pain, nausea and vomiting  Genitourinary: Negative for dysuria and flank pain  Skin: Negative for rash and wound  Neurological: Negative for dizziness, weakness, light-headedness and numbness  All other systems reviewed and are negative  Historical Information     Past Medical History:   Diagnosis Date    Hypertension      History reviewed  No pertinent surgical history    Social History   History   Alcohol Use    1 2 oz/week    2 Glasses of wine per week     Comment: daily     History   Drug Use No     History   Smoking Status    Never Smoker   Smokeless Tobacco    Never Used     Comment: former smoker per allscripts,pack a day for about 20 years, quit 10 years ago     Immunization History   Administered Date(s) Administered    Influenza 10/27/2015, 10/27/2015, 10/05/2016    Influenza Quadrivalent Preservative Free 3 years and older IM 10/05/2016    Pneumococcal Conjugate 13-Valent 10/27/2015    Pneumococcal Conjugate PCV 7 10/27/2015    Tdap 03/28/2018    Zoster 11/27/2015       Meds/Allergies   all current active meds have been reviewed, current meds:   No current facility-administered medications for this encounter  and PTA meds:   Prior to Admission Medications   Prescriptions Last Dose Informant Patient Reported? Taking? ALPRAZolam (XANAX) 0 5 mg tablet   No Yes   Sig: Take 1 tablet (0 5 mg total) by mouth daily for 90 days   aspirin 81 MG tablet   Yes Yes   Sig: Take 81 mg by mouth daily   lisinopril (ZESTRIL) 20 mg tablet   No Yes   Sig: Take 1 tablet (20 mg total) by mouth daily   metoprolol succinate (TOPROL-XL) 50 mg 24 hr tablet   No Yes   Sig: Take 1 tablet (50 mg total) by mouth daily      Facility-Administered Medications: None       Allergies   Allergen Reactions    Penicillins          PHYSICAL EXAM      Objective   Vitals:   First set: Temperature: 98 3 °F (36 8 °C) (09/02/18 1634)  Pulse: 66 (09/02/18 1634)  Respirations: 18 (09/02/18 1634)  Blood Pressure: (!) 177/79 (09/02/18 1634)    Primary Survey:   (A) Airway: Intact  (B) Breathing: No distress  (C) Circulation: Pulses:   Normal  Palpable DP and PT b/l  (D) Disabliity:  GCS Total:  15  (E) Expose:  Completed    Secondary Survey: (Click on Physical Exam tab above)  Physical Exam   Constitutional: She is oriented to person, place, and time  She appears well-developed and well-nourished  HENT:   Head: Normocephalic and atraumatic  Eyes: EOM are normal  Pupils are equal, round, and reactive to light  Neck: Normal range of motion  No tracheal deviation present  Cardiovascular: Normal rate and regular rhythm  Pulmonary/Chest: Effort normal    Abdominal: Soft  She exhibits no distension  There is no tenderness  There is no rebound and no guarding  Musculoskeletal:   Left forearm tender to palpation  Swollen  Decreased wrist extension  Left paraspinal tenderness  No midline tenderness to palpation  Neurological: She is alert and oriented to person, place, and time  Skin: Skin is warm  No rash noted  Psychiatric: She has a normal mood and affect  Her behavior is normal  Judgment and thought content normal        Invasive Devices     Peripheral Intravenous Line            Peripheral IV 09/02/18 Left Antecubital less than 1 day                Lab Results:   Results: I have personally reviewed pertinent reports   , BMP/CMP:   Lab Results   Component Value Date     09/02/2018    K 4 4 09/02/2018     09/02/2018    CO2 27 09/02/2018    BUN 12 09/02/2018    CREATININE 0 76 09/02/2018    CALCIUM 9 4 09/02/2018    AST 26 09/02/2018    ALT 15 09/02/2018    ALKPHOS 54 (L) 09/02/2018    EGFR 83 09/02/2018    and CBC:   Lab Results   Component Value Date    WBC 7 50 09/02/2018    HGB 11 9 (L) 09/02/2018    HCT 34 7 (L) 09/02/2018    MCV 89 09/02/2018     09/02/2018    MCH 30 7 09/02/2018    MCHC 34 3 09/02/2018    RDW 12 7 09/02/2018    MPV 6 7 (L) 09/02/2018    NRBC 0 09/02/2018     Imaging/EKG Studies:  Xr Wrist 3+ Views Left    Result Date: 9/2/2018  Impression: No acute osseous abnormality of the left wrist  Signed by Michael Goldberg DO    Xr Hip/pelv 2-3 Vws Right    Result Date: 9/2/2018  Impression: No acute osseous abnormality of the pelvis or right hip  Signed by Michael Goldberg DO    Ct Lumbar Spine Without Contrast    Result Date: 9/2/2018  Impression: Moderate superior endplate compression of L2 that is felt to be acute/subacute with slight retropulsion of fragment into the canal not producing significant stenosis  No other clearly significant/acute abnormality is seen on noncontrast CT of the lumbar spine   Signed by Priscilla Escalera MD      Code Status: Prior  Advance Directive and Living Will:      Power of :    POLST:

## 2018-09-03 ENCOUNTER — APPOINTMENT (INPATIENT)
Dept: RADIOLOGY | Facility: HOSPITAL | Age: 63
DRG: 552 | End: 2018-09-03
Payer: MEDICARE

## 2018-09-03 VITALS
RESPIRATION RATE: 18 BRPM | HEART RATE: 66 BPM | WEIGHT: 133.82 LBS | DIASTOLIC BLOOD PRESSURE: 62 MMHG | SYSTOLIC BLOOD PRESSURE: 132 MMHG | HEIGHT: 66 IN | TEMPERATURE: 98.6 F | OXYGEN SATURATION: 94 % | BODY MASS INDEX: 21.51 KG/M2

## 2018-09-03 PROBLEM — S32.020A CLOSED COMPRESSION FRACTURE OF L2 LUMBAR VERTEBRA: Status: ACTIVE | Noted: 2018-09-03

## 2018-09-03 PROCEDURE — 97166 OT EVAL MOD COMPLEX 45 MIN: CPT

## 2018-09-03 PROCEDURE — 72100 X-RAY EXAM L-S SPINE 2/3 VWS: CPT

## 2018-09-03 PROCEDURE — G8978 MOBILITY CURRENT STATUS: HCPCS

## 2018-09-03 PROCEDURE — G8987 SELF CARE CURRENT STATUS: HCPCS

## 2018-09-03 PROCEDURE — 97163 PT EVAL HIGH COMPLEX 45 MIN: CPT

## 2018-09-03 PROCEDURE — G8988 SELF CARE GOAL STATUS: HCPCS

## 2018-09-03 PROCEDURE — 99238 HOSP IP/OBS DSCHRG MGMT 30/<: CPT | Performed by: PHYSICIAN ASSISTANT

## 2018-09-03 PROCEDURE — G8979 MOBILITY GOAL STATUS: HCPCS

## 2018-09-03 PROCEDURE — G8989 SELF CARE D/C STATUS: HCPCS

## 2018-09-03 RX ORDER — METHOCARBAMOL 500 MG/1
500 TABLET, FILM COATED ORAL EVERY 6 HOURS SCHEDULED
Status: DISCONTINUED | OUTPATIENT
Start: 2018-09-03 | End: 2018-09-03 | Stop reason: HOSPADM

## 2018-09-03 RX ORDER — OXYCODONE HYDROCHLORIDE 5 MG/1
5 TABLET ORAL EVERY 4 HOURS PRN
Qty: 15 TABLET | Refills: 0 | Status: SHIPPED | OUTPATIENT
Start: 2018-09-03 | End: 2018-09-04

## 2018-09-03 RX ORDER — LIDOCAINE 50 MG/G
1 PATCH TOPICAL DAILY
Status: DISCONTINUED | OUTPATIENT
Start: 2018-09-03 | End: 2018-09-03 | Stop reason: HOSPADM

## 2018-09-03 RX ORDER — METHOCARBAMOL 500 MG/1
500 TABLET, FILM COATED ORAL EVERY 6 HOURS SCHEDULED
Qty: 25 TABLET | Refills: 0 | Status: SHIPPED | OUTPATIENT
Start: 2018-09-03 | End: 2018-09-10 | Stop reason: SDUPTHER

## 2018-09-03 RX ORDER — ACETAMINOPHEN 325 MG/1
975 TABLET ORAL EVERY 8 HOURS SCHEDULED
Status: DISCONTINUED | OUTPATIENT
Start: 2018-09-03 | End: 2018-09-03 | Stop reason: HOSPADM

## 2018-09-03 RX ORDER — ACETAMINOPHEN 325 MG/1
650 TABLET ORAL EVERY 6 HOURS PRN
Qty: 30 TABLET | Refills: 0
Start: 2018-09-03 | End: 2020-04-23 | Stop reason: ALTCHOICE

## 2018-09-03 RX ADMIN — ACETAMINOPHEN 975 MG: 325 TABLET, FILM COATED ORAL at 09:37

## 2018-09-03 RX ADMIN — HEPARIN SODIUM 5000 UNITS: 5000 INJECTION, SOLUTION INTRAVENOUS; SUBCUTANEOUS at 05:57

## 2018-09-03 RX ADMIN — LISINOPRIL 20 MG: 20 TABLET ORAL at 09:37

## 2018-09-03 RX ADMIN — OXYCODONE HYDROCHLORIDE 10 MG: 10 TABLET ORAL at 05:57

## 2018-09-03 RX ADMIN — METOPROLOL SUCCINATE 50 MG: 50 TABLET, EXTENDED RELEASE ORAL at 09:37

## 2018-09-03 RX ADMIN — HYDROMORPHONE HYDROCHLORIDE 0.5 MG: 1 INJECTION, SOLUTION INTRAMUSCULAR; INTRAVENOUS; SUBCUTANEOUS at 08:16

## 2018-09-03 RX ADMIN — ONDANSETRON 4 MG: 2 INJECTION INTRAMUSCULAR; INTRAVENOUS at 08:43

## 2018-09-03 RX ADMIN — OXYCODONE HYDROCHLORIDE 10 MG: 10 TABLET ORAL at 13:59

## 2018-09-03 RX ADMIN — OXYCODONE HYDROCHLORIDE 10 MG: 10 TABLET ORAL at 01:21

## 2018-09-03 RX ADMIN — ACETAMINOPHEN 975 MG: 325 TABLET, FILM COATED ORAL at 13:59

## 2018-09-03 RX ADMIN — METHOCARBAMOL 500 MG: 500 TABLET ORAL at 11:20

## 2018-09-03 RX ADMIN — HEPARIN SODIUM 5000 UNITS: 5000 INJECTION, SOLUTION INTRAVENOUS; SUBCUTANEOUS at 14:02

## 2018-09-03 RX ADMIN — OXYCODONE HYDROCHLORIDE 10 MG: 10 TABLET ORAL at 09:38

## 2018-09-03 RX ADMIN — LIDOCAINE 1 PATCH: 50 PATCH TOPICAL at 09:37

## 2018-09-03 RX ADMIN — HYDROMORPHONE HYDROCHLORIDE 0.5 MG: 1 INJECTION, SOLUTION INTRAMUSCULAR; INTRAVENOUS; SUBCUTANEOUS at 03:36

## 2018-09-03 NOTE — DISCHARGE INSTRUCTIONS
Neurosurgery Discharge Instruction:  LSO brace to be worn when out of bed of head of bed greater than 45 degrees  May be removed for showering  No heavy lifting  No strenuous activities  No Driving  **Please notify MD immediately if you have increased back or leg pain  New numbness, tingling and/or weakness in your legs  **    Please ascertain upright prior to your appointment

## 2018-09-03 NOTE — TERTIARY TRAUMA SURVEY
Progress Note - Tertiary Trauma Survery   Loreto Garcia 70 y o  female MRN: [id number]  Unit/Bed#: [facility] 917-01 Encounter: [id number]    Summary of Diagnosed Injuries:   1  L2 compression fracture with slight retropulsion   - LSO ordered   - PT OT   - p r n  pain management   - upright ordered   - likely DC with outpatient follow-up neurosurgery    2  History of hypertension   - restart home meds    3  PT and OT    - eval and treat    4  DVT prophylaxis   - SCDs and subcutaneous heparin    Disposition:  Likely discharge pending PT and OT  Awaiting upright x-rays  Mechanism of Injury: Fall    Transfer from:  Transfer  Outside Films Received: Yes  Tertiary Exam Due on: 9/3/18    Vitals: Blood pressure 132/62, pulse 66, temperature 98 6 °F (37 °C), temperature source Oral, resp  rate 18, height 5' 6" (1 676 m), weight 60 7 kg (133 lb 13 1 oz), SpO2 94 %  ,Body mass index is 21 6 kg/m²  CT / RADIOGRAPHS: ALL RESULTS MUST BE CONFIRMED BY FACULTY OR PRINTED REPORT    Xr Wrist 3+ Views Left    Result Date: 9/2/2018  Impression: No acute osseous abnormality of the left wrist  Signed by Sarah Galaviz DO    Xr Hip/pelv 2-3 Vws Right    Result Date: 9/2/2018  Impression: No acute osseous abnormality of the pelvis or right hip  Signed by Sarah Galaviz DO    Ct Lumbar Spine Without Contrast    Result Date: 9/2/2018  Impression: Moderate superior endplate compression of L2 that is felt to be acute/subacute with slight retropulsion of fragment into the canal not producing significant stenosis  No other clearly significant/acute abnormality is seen on noncontrast CT of the lumbar spine   Signed by Zelda Dumont MD    Consultants - List Service/ Faculty and Date: Neurosurgery    Active medications:           Current Facility-Administered Medications:     acetaminophen (TYLENOL) tablet 650 mg, 650 mg, Oral, Q6H PRN    ALPRAZolam (XANAX) tablet 0 5 mg, 0 5 mg, Oral, Daily    heparin (porcine) subcutaneous injection 5,000 Units, 5,000 Units, Subcutaneous, Q8H [address], 5,000 Units at 09/03/18 0557 **AND** Platelet count, , , Once    HYDROmorphone (DILAUDID) injection 0 5 mg, 0 5 mg, Intravenous, Q4H PRN, 0 5 mg at 09/03/18 0816    lisinopril (ZESTRIL) tablet 20 mg, 20 mg, Oral, Daily    melatonin tablet 3 mg, 3 mg, Oral, HS PRN    methocarbamol (ROBAXIN) tablet 500 mg, 500 mg, Oral, Q6H PRN, 500 mg at 09/02/18 1933    metoprolol (LOPRESSOR) injection 5 mg, 5 mg, Intravenous, Q6H PRN    metoprolol succinate (TOPROL-XL) 24 hr tablet 50 mg, 50 mg, Oral, Daily    ondansetron (ZOFRAN) injection 4 mg, 4 mg, Intravenous, Q6H PRN, 4 mg at 09/03/18 0843    oxyCODONE (ROXICODONE) immediate release tablet 10 mg, 10 mg, Oral, Q4H PRN, 10 mg at 09/03/18 0557    oxyCODONE (ROXICODONE) IR tablet 5 mg, 5 mg, Oral, Q4H PRN      Intake/Output Summary (Last 24 hours) at 09/03/18 1350  Last data filed at 09/03/18 2449   Gross per 24 hour   Intake              200 ml   Output                0 ml   Net              200 ml       Invasive Devices     Peripheral Intravenous Line            Peripheral IV 09/02/18 Left Antecubital less than 1 day                Is the patient 72 years or older: YES:    1  Before the illness or injury that brought you to the Emergency, did you need someone to help you on a regular basis? 0=No   2  Since the illness or injury that brought you to the Emergency, have you needed more help than usual to take care of yourself? 0=No   3  Have you been hospitalized for one or more nights during the past 6 months (excluding a stay in the Emergency Department)? 0=No   4  In general, do you see well? 0=Yes   5  In general, do you have serious problems with your memory? 0=No   6  Do you take more than three different medications everyday? 1=Yes   TOTAL   1     Did you order a geriatric consult if the score was 2 or greater?: no    1  GCS:  GCS Total:  15  2  Head:   WNL  3  Neck:   WNL  4  Chest:   WNL  5   Abdomen/Pelvis: WNL  6  Back (log roll with spinal immobilization unless cleared radiographically):    Tenderness over the paraspinal region of the lumbar spine  No point tenderness on palpation of midline  7  Extremities:   Lacs, abrasions, swelling, ecchymosis:  Neurovascularly intact on extremities, strength 5/5 in lower and upper extremities  Tenderness, pain with motor, instability:  Sensation intact, +2 pulses  8  Peripheral Nerves: WNL    Do NOT use the following abbreviations: DTO, gr, Jean, MS, MSO4, MgSO4, Nitro, QD, QID, QOD, u, , ?, ?g or trailing zeros  Always use a zero before a decimal     Labs:   CBC:   Lab Results   Component Value Date    WBC 7 50 09/02/2018    HGB 11 9 (L) 09/02/2018    HCT 34 7 (L) 09/02/2018    MCV 89 09/02/2018     09/02/2018    MCH 30 7 09/02/2018    MCHC 34 3 09/02/2018    RDW 12 7 09/02/2018    MPV 6 7 (L) 09/02/2018    NRBC 0 09/02/2018     CMP:   Lab Results   Component Value Date     09/02/2018     09/02/2018    CO2 27 09/02/2018    BUN 12 09/02/2018    CREATININE 0 76 09/02/2018    CALCIUM 9 4 09/02/2018    AST 26 09/02/2018    ALT 15 09/02/2018    ALKPHOS 54 (L) 09/02/2018    EGFR 83 09/02/2018     Family updated at bedside    Nurse provider rounds completed, plan of care discussed pain

## 2018-09-03 NOTE — PHYSICAL THERAPY NOTE
Physical Therapy Evaluation    Patient's Name:Loreto Garcia    Today's Date:09/03/18    Patient Active Problem List   Diagnosis    Hyponatremia    Anxiety    Benign essential hypertension    Raynaud's syndrome without gangrene    Special screening for malignant neoplasms, colon    Closed compression fracture of L2 lumbar vertebra [facility])       Past Medical History:   Diagnosis Date    Hypertension        History reviewed  No pertinent surgical history  09/03/18 1155   Pain Assessment   Pain Assessment 0-10   Pain Score 5   Hospital Pain Intervention(s) Ambulation/increased activity   Response to Interventions unchanged   Home Living   Type of [address] One level  (0 WING)   Prior Function   Level of [place] Independent with ADLs and functional mobility   Lives With Spouse   Receives Help From Family   Restrictions/Precautions   Braces or Orthoses LSO   Other Precautions Pain; Fall Risk;Spinal precautions   General   Family/Caregiver Present Yes   Cognition   Arousal/Participation Alert   Orientation Level Oriented X4   Following Commands Follows all commands and directions without difficulty   RUE Assessment   RUE Assessment WFL   LUE Assessment   LUE Assessment (L wrist pain limited- brace for support)   RLE Assessment   RLE Assessment X   Strength RLE   RLE Overall Strength 4/5   LLE Assessment   LLE Assessment X   Strength LLE   LLE Overall Strength 4/5   Coordination   Movements are Fluid and Coordinated 0   Coordination and Movement Description LE instability   Bed Mobility   Supine to Sit 5  Supervision   Additional items Increased time required;Verbal cues   Sit to Supine 5  Supervision   Additional items Increased time required;Verbal cues   Transfers   Sit to Stand 5  Supervision   Additional items Increased time required;Verbal cues   Stand to Sit 5  Supervision   Additional items Increased time required;Verbal cues   Stand pivot 4  Minimal assistance   Additional items Verbal cues; Increased time required   Sliding Board transfer (RW)   Ambulation/Elevation   Gait pattern Improper Weight shift;Decreased foot clearance  (decreased LUE WB and steering 2* pain wrist brace in place)   Gait Assistance 4  Minimal assist   Additional items Verbal cues; Tactile cues   Assistive Device Rolling walker   Distance 30  70 ft   Balance   Static Standing Fair +  (RW)   Dynamic Standing Fair -  (RW)   Endurance Deficit   Endurance Deficit Yes   Endurance Deficit Description pain limited   Activity Tolerance   Activity Tolerance Patient limited by pain   Nurse Made Aware yes   Assessment   Prognosis Excellent   Problem List Decreased strength;Decreased range of motion;Decreased endurance; Impaired balance;Decreased mobility; Decreased coordination; Impaired judgement;Decreased safety awareness;Pain;Orthopedic restrictions   Assessment Pt is a 70 y o  female admitted [facility] on 9/2/2018 w/ Closed compression fracture of L2 lumbar vertebra (HCC); currently non-op in LSO brace w spine precautions Pt exhibits significant impairments with weakness, decreased ROM, impaired balance, decreased endurance, impaired coordination, gait deviations, pain, decreased activity tolerance, decreased functional mobility tolerance, decreased safety awareness, impaired judgement, fall risk, orthopedic restrictions and decreased skin integrity; these impact independence with mobility, ADLs, and IADLs; requires min contact guard assist w transf and amb 30, 70 ft using RW L wrist brace donned for swelling pain comfort; gait pattern w inconsistent jocelyne, walker reliant; hthis session pt and spouse educated on spine precautions LSO alignment danyell casanova; also educated on bed mob to/from supine to sit position- pt exhibited goood understanding w return demo; objective measures on the Barthel Index also reveal limitations;  therapy prognosis is impacted by relevant co morbidities as noted in evaluation; clinical presentation is currently unstable/unpredictable - pt is on cont pulse oximetry, presents w pain phys impairments as noted- a regression from baseline; PTA, pt was Independent with mobility lives in single level setup w spouse available to assist  skilled PT is indicated to to optimize functional independence and discharge planning; pending functional progress, PT recommendation at discharge is OP PT and home with family support; rec OPPT for bal dysfunction; will need RW for safe mob   Goals   Patient Goals go home   STG Expiration Date 09/13/18   Short Term Goal #1 1  Independent with Bed Mobility Rolling Right and Left     2  Independent with Bed Mobility Supine-Sit     3  Modified Independent with Transfer Bed-Chair     4  Increase Dynamic Sitting Balance at least 1 Grade for improved stability with functional reach activities     5  Increase Dynamic Standing Balance at least 1 Grade for improved ease with Activities of Daily Living     6  Increase Lower Extremity Strength at least 1 Grade for improved ease mobility tasks     7  Modified Independent with  Ambulation 300 feet using RW LSO donned to facilitate home and community mobility 8  Modified Independent with Ascending/Descending 14 steps to facilitate community accessibility  9  3/3x execution of spine precautions w funct mob tasks   Plan   Treatment/Interventions Functional transfer training;Elevations; Therapeutic exercise;LE strengthening/ROM; Endurance training;Patient/family training;Cognitive reorientation;Equipment eval/education; Bed mobility;Gait training; Compensatory technique education;Continued evaluation;Spoke to nursing   PT Frequency (3-6x/wk)   Recommendation   Recommendation Outpatient PT; Home with family support   Equipment Recommended Walker   Barthel Index   Feeding 5   Bathing 0   Grooming Score 0   Dressing Score 5   Bladder Score 10   Bowels Score 10   Toilet Use Score 5   Transfers (Bed/Chair) Score 10   Mobility (Level Surface) Score 0 Stairs Score 0   Barthel Index Score 45

## 2018-09-03 NOTE — DISCHARGE SUMMARY
Discharge Summary - Khalida Crain 70 y o  female MRN: [id number]    [facility] Encounter: [id number]    Admission Date:   Admission Orders     Ordered        09/02/18 1908  Inpatient Admission  Once               Admitting Diagnosis: Pain [R52]  Closed compression [facility] second lumbar [facility] [S32 020A]    HPI: Per Lissette Kruse, "Khalida Crain is a 70 y o  female who presents as a trauma transfer s/p slip and fall earlier in day  MHx includes HTN  She was [facility]  CT lumbar spine showing L2 compression fracture with slight retropulsion  L forearm Xray normal  She was transferred here  She complains of some lower back pain that has improved since her injury and left forearm pain  Procedures Performed: No orders of the defined types were placed in this encounter  Summary of Hospital Course:  Patient is a 75-year-old female presents as a trauma transfer after a slip and fall  She was noted to have an L2 compression fracture  She was fitted with an LSO brace by the trauma team   Neurosurgery signed off after upright for completed in stable  She cleared PT  She was discharged in stable condition  She would have outpatient follow-up with Neurosurgery team in 2 weeks  Significant Findings, Care, Treatment and Services Provided: Xr Wrist 3+ Views Left    Result Date: 9/2/2018  Impression: No acute osseous abnormality of the left wrist  Signed by Pradeep Finley DO    Xr Hip/pelv 2-3 Vws Right    Result Date: 9/2/2018  Impression: No acute osseous abnormality of the pelvis or right hip  Signed by Pradeep Finley DO    Ct Lumbar Spine Without Contrast    Result Date: 9/2/2018  Impression: Moderate superior endplate compression of L2 that is felt to be acute/subacute with slight retropulsion of fragment into the canal not producing significant stenosis  No other clearly significant/acute abnormality is seen on noncontrast CT of the lumbar spine   Signed by Jacques Arguello MD      Complications:  No complications    Discharge Diagnosis:   Patient Active Problem List   Diagnosis    Hyponatremia    Anxiety    Benign essential hypertension    Raynaud's syndrome without gangrene    Special screening for malignant neoplasms, colon    Closed compression fracture of L2 lumbar vertebra [facility])         Resolved Problems  Date Reviewed: 3/27/2018    None          Condition at Discharge: fair         Discharge instructions/Information to patient and family:   See after visit summary for information provided to patient and family  Provisions for Follow-Up Care:  See after visit summary for information related to follow-up care and any pertinent home health orders  PCP: Carlos Dinh MD    Disposition: Home    Planned Readmission: No    Discharge Statement   I spent 21 minutes discharging the patient  This time was spent on the day of discharge  I had direct contact with the patient on the day of discharge  Additional documentation is required if more than 30 minutes were spent on discharge  Discharge Medications:  See after visit summary for reconciled discharge medications provided to patient and family

## 2018-09-03 NOTE — PLAN OF CARE
Problem: PHYSICAL THERAPY ADULT  Goal: Performs mobility at highest level of function for planned discharge setting  See evaluation for individualized goals  Treatment/Interventions: Functional transfer training, Elevations, Therapeutic exercise, LE strengthening/ROM, Endurance training, Patient/family training, Cognitive reorientation, Equipment eval/education, Bed mobility, Gait training, Compensatory technique education, Continued evaluation, Spoke to nursing  Equipment Recommended: Nancy Jenkins       See flowsheet documentation for full assessment, interventions and recommendations    Prognosis: Excellent  Problem List: Decreased strength, Decreased range of motion, Decreased endurance, Impaired balance, Decreased mobility, Decreased coordination, Impaired judgement, Decreased safety awareness, Pain, Orthopedic restrictions  Assessment: Pt is a 70 y o  female admitted to [facility] on 9/2/2018 w/ Closed compression fracture of L2 lumbar vertebra (HCC); currently non-op in LSO brace w spine precautions Pt exhibits significant impairments with weakness, decreased ROM, impaired balance, decreased endurance, impaired coordination, gait deviations, pain, decreased activity tolerance, decreased functional mobility tolerance, decreased safety awareness, impaired judgement, fall risk, orthopedic restrictions and decreased skin integrity; these impact independence with mobility, ADLs, and IADLs; requires min contact guard assist w transf and amb 30, 70 ft using RW L wrist brace donned for swelling pain comfort; gait pattern w inconsistent jocelyne, walker reliant; hthis session pt and spouse educated on spine precautions LSO alignment danyell casanova; also educated on bed mob to/from supine to sit position- pt exhibited goood understanding w return demo; objective measures on the Barthel Index also reveal limitations;  therapy prognosis is impacted by relevant co morbidities as noted in evaluation; clinical presentation is currently unstable/unpredictable - pt is on cont pulse oximetry, presents w pain phys impairments as noted- a regression from baseline; PTA, pt was Independent with mobility lives in single level setup w spouse available to assist  skilled PT is indicated to to optimize functional independence and discharge planning; pending functional progress, PT recommendation at discharge is OP PT and home with family support; rec OPPT for bal dysfunction; will need RW for safe mob        Recommendation: Outpatient PT, Home with family support          See flowsheet documentation for full assessment

## 2018-09-03 NOTE — CASE MANAGEMENT
Initial Clinical Review    Admission: Date/Time/Statement: 9/2/18 @ 1907 Inpatient Written     Orders Placed This Encounter   Procedures    Inpatient Admission     Standing Status:   Standing     Number of Occurrences:   1     Order Specific Question:   Admitting Physician     Answer:   Daisy Livingston     Order Specific Question:   Level of Care     Answer:   Med Surg [16]     Order Specific Question:   Bed Type     Answer:   Trauma [7]     Order Specific Question:   Estimated length of stay     Answer:   More than 2 Midnights     Order Specific Question:   Certification     Answer:   I certify that inpatient services are medically necessary for this patient for a duration of greater than two midnights  See H&P and MD Progress Notes for additional information about the patient's course of treatment  ED: Date/Time/Mode of Arrival:   ED Arrival Information     Expected Arrival Acuity Means of Arrival Escorted By Service Admission Type    9/2/2018 15:49 9/2/2018 16:33 Urgent Ambulance [facility]FFILI[facility] WITH [facility] Trauma Urgent    Arrival Complaint    -          Chief Complaint:   Chief Complaint   Patient presents with    Fall     Pt is a trauma transfer after a fall       History of Illness: Claudia Up is a 70 y o  female who presents as a trauma transfer s/p slip and fall earlier in day  MHx includes HTN  She was seen at [facility]  CT lumbar spine showing L2 compression fracture with slight retropulsion  L forearm Xray normal  She was transferred here   She complains of some lower back pain that has improved since her injury and left forearm pain         Vital Signs:   Triage Vitals   Temperature Pulse Respirations Blood Pressure SpO2   09/02/18 1634 09/02/18 1634 09/02/18 1634 09/02/18 1634 09/02/18 1634   98 3 °F (36 8 °C) 66 18 (!) 177/79 98 %      Temp Source Heart Rate Source Patient Position - Orthostatic VS BP Location FiO2 (%)   09/02/18 1634 09/02/18 1634 09/02/18 1634 09/02/18 2031 --   Oral Monitor Lying Right arm       Pain Score       09/02/18 1721       7        Wt Readings from Last 1 Encounters:   09/02/18 60 7 kg (133 lb 13 1 oz)       Vital Signs (abnormal): resps 8-37, /88, 172/75, 189/85, O2 sat 84% on RA    Abnormal Labs/Diagnostic Test Results:   ALKPHOS 54 (L)     HGB 11 9 (L)   HCT 34 7 (L)   MPV 6 7 (L)     Xr Wrist 3+ Views Left  Result Date: 9/2/2018  Impression: No acute osseous abnormality of the left wrist       Xr Hip/pelv 2-3 Vws Right  Result Date: 9/2/2018  Impression: No acute osseous abnormality of the pelvis or right hip       Ct Lumbar Spine Without Contrast  Result Date: 9/2/2018  Impression: Moderate superior endplate compression of L2 that is felt to be acute/subacute with slight retropulsion of fragment into the canal not producing significant stenosis  No other clearly significant/acute abnormality is seen on noncontrast CT of the lumbar spine  Treatment:   Medication Administration from 09/02/2018 1549 to 09/02/2018 2020       Date/Time Order Dose Route Action     09/02/2018 1933 methocarbamol (ROBAXIN) tablet 500 mg 500 mg Oral Given          Past Medical/Surgical History:    Active Ambulatory Problems     Diagnosis Date Noted    Hyponatremia 10/21/2016    Anxiety 12/05/2014    Benign essential hypertension 12/05/2014    Raynaud's syndrome without gangrene 12/05/2014    Special screening for malignant neoplasms, colon 04/30/2018     Resolved Ambulatory Problems     Diagnosis Date Noted    Slurred speech 10/22/2016     Past Medical History:   Diagnosis Date    Hypertension        Admitting Diagnosis: Pain [R52]  [facility] second [facility] [S32 020A]    Age/Sex: 70 y o  female    Assessment/Plan   Trauma Alert: Evaluation  Model of Arrival: Ambulance  Trauma Team: Attending Dr Dario Sawyer and Residents Dr Dimitrios Beard  Consultants: Neurosurgery     Trauma Active Problems:   L2 compression fracture w/ slight retropulsion     Trauma Plan:   Neurosurgery consult  Lumbar precautions  LSO brace  Neurochecks q2  Upright lumbar xrays in AM    Admission Orders:  Regular diet  Lspine precautions, strict bed rest, spine brace  IO  Neuro checks q2h  XR Lspine  Pt, ot  Sequential compression device    Scheduled Meds:   Current Facility-Administered Medications:  acetaminophen 975 mg Oral Q8H [facility]   ALPRAZolam 0 5 mg Oral Daily   heparin (porcine) 5,000 Units Subcutaneous Q8H [facility]   HYDROmorphone 0 5 mg Intravenous Q4H PRN   lidocaine 1 patch Transdermal Daily   lisinopril 20 mg Oral Daily   melatonin 3 mg Oral HS PRN   methocarbamol 500 mg Oral Q6H LORRAINE   metoprolol 5 mg Intravenous Q6H PRN   metoprolol succinate 50 mg Oral Daily   ondansetron 4 mg Intravenous Q6H PRN   oxyCODONE 10 mg Oral Q4H PRN   oxyCODONE 5 mg Oral Q4H PRN     Continuous Infusions:    PRN Meds: HYDROmorphone 0 5mg IV x3 thus far    melatonin    metoprolol    Ondansetron 4mg IV x1 thus far    oxyCODONE 10mg x4 thus far    oxyCODONE    Thank you,  [address]  Utilization Review Department  Phone: [phone]; Fax [phone]  ATTENTION: Please call with any questions or concerns to [phone]  and carefully follow the prompts so that you are directed to the right person  Send all requests for admission clinical reviews, approved or denied determinations and any other requests to fax [phone]   All voicemails are confidential

## 2018-09-03 NOTE — OCCUPATIONAL THERAPY NOTE
[address] Evaluation     Patient Name: Luigi Zamora  HVUAH'V Date: 9/3/2018  Problem List  Patient Active Problem List   Diagnosis    Hyponatremia    Anxiety    Benign essential hypertension    Raynaud's syndrome without gangrene    Special screening for malignant neoplasms, colon    Closed compression fracture of L2 lumbar vertebra [facility])     Past Medical History  Past Medical History:   Diagnosis Date    Hypertension      Past Surgical History  History reviewed  No pertinent surgical history  09/03/18 1201   Note Type   Note type Eval only   Restrictions/Precautions   Weight Bearing Precautions Per Order No   Braces or Orthoses LSO   Other Precautions Spinal precautions; Fall Risk;Pain   Pain Assessment   Pain Assessment 0-10   Pain Type Acute pain   Pain Location Hand;Wrist   Pain Orientation Left   Hospital Pain Intervention(s) Ambulation/increased activity;Repositioned   Response to Interventions improved with positioning adjustment to brace   Home Living   Type of Home Apartment   Home Layout One level;Elevator   Bathroom Shower/Tub Tub/shower unit   Bathroom Toilet Standard   Additional Comments Pt lives with her  in a single story apartment w/ an elevator  Prior Function   Level of [place] Independent with ADLs and functional mobility   Lives With Spouse   Receives Help From Family   ADL Assistance Independent   IADLs Independent   Falls in the last 6 months 1 to 4  (1- current)   Vocational Full time employment   Comments Pt was I w/  ADLS and IADLS, drove, 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     Lifestyle   Autonomy Pt was I w/ ADLS/IADLS PTA  + driving    Reciprocal Relationships Pt lives with her  who is able to provide A upon D/C   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    Intrinsic Gratification Pt active PTA    Psychosocial   Psychosocial (WDL) WDL   ADL   Eating Assistance 5  Supervision/Setup   Grooming Assistance 5  Supervision/Setup   UB Bathing Assistance 5  Supervision/Setup   LB Bathing Assistance 4  Minimal Assistance   UB Dressing Assistance 4  Minimal Assistance   LB Dressing Assistance 4  Minimal Assistance   Toileting Assistance  4  Minimal Assistance   Bed Mobility   Supine to Sit 5  Supervision   Additional items Increased time required   Transfers   Sit to Stand 5  Supervision   Additional items Increased time required   Stand to Sit 5  Supervision   Additional items Increased time required   Functional Mobility   Functional Mobility 5  Supervision   Additional items Rolling walker   Balance   Static Sitting Normal   Dynamic Sitting Good   Static Standing Fair   Dynamic Standing Fair   Ambulatory Fair -   Activity Tolerance   Activity Tolerance Patient limited by pain   Medical Staff Made Aware Trauma PA [address]   Nurse Made Aware yes   RUE Assessment   RUE Assessment WFL   LUE Assessment   LUE Assessment WFL   Hand Function   Gross Motor Coordination Functional   Fine Motor Coordination Impaired  (pain, brusing and swelling in L hand/wrist )   Cognition   Overall Cognitive Status WFL   Arousal/Participation Alert; Responsive; Cooperative   Attention Within functional limits   Memory Within functional limits   Following Commands Follows all commands and directions without difficulty   Assessment   Limitation Decreased ADL status; Decreased endurance;Decreased self-care trans;Decreased high-level ADLs; Decreased fine motor control   Prognosis Good   Assessment Pt is a 71 y/o female seen for OT eval s/p adm to [facility] s/p fall w/ L2 compression fx and L hand pain  Comorbidities include a h/o HTN  Pt with active OT orders and activity as tolerated orders  Pt lives with her  in a single story apartment w/ an elevator  Pt was I w/  ADLS and IADLS, drove, 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  Pt is currently demonstrating the following occupational deficits: min A LB ADLS, min A UB ADLS, S functional transfers and S functional mobility using rw   Pt is limited 2* pain, decreased functional forward reach, spinal precautions and decreased functional use of L hand  Pt scored overall 75/100 on the Barthel Index  Based on the aforementioned OT evaluation, functional performance deficits, and assessments, pt has been identified as a moderate complexity evaluation  Anticipate pt will be able to D/C home with family support once medically stable  Recommend perform bathing with supervision for safety  Educated pt regarding ADLs, precautions, compensatory strategies, safety in the home, energy conservation and activity engagement  Pt with no concerns for D/C and all questions answered at this time  Pt no longer requires acute care skilled OT services  Recommend pt participate in ADLs and ambulate hallways with non-OT staff  Please re-consult if changes occur  D/C OT      Goals   Patient Goals to go home and get back to work    Recommendation   OT Discharge Recommendation Home with family support   OT - OK to Discharge Yes   Barthel Index   Feeding 10   Bathing 0   Grooming Score 5   Dressing Score 5   Bladder Score 10   Bowels Score 10   Toilet Use Score 10   Transfers (Bed/Chair) Score 15   Mobility (Level Surface) Score 10   Stairs Score 0   Barthel Index Score 75   Modified [place] Scale   Modified [place] Scale 3       Susan Munguia MS, OTR/L

## 2018-09-03 NOTE — ORTHOTIC NOTE
Orthotic Note            Date: 9/3/2018      Patient Name: Delisa Carreon            Reason for Consult:  Patient Active Problem List   Diagnosis    Hyponatremia    Anxiety    Benign essential hypertension    Raynaud's syndrome without gangrene    Special screening for malignant neoplasms, colon    Closed compression fracture of L2 lumbar vertebra ([address] )     Upon entering room to fit [employer] LSO brace, pt c/o back pain  DONNA Lambert aware and administered pain medicine prior to the fitting of the LSO brace  Ortho Tech measured/fit/donned/educated pt for an [employer] while pt was sitting up at the EOB  Bilateral sides set to a size Medium for optimal fit/comfort  Pt able to demonstrate donning/doffing LSO brace  Assisted pt OOB to the chair with the LSO brace on  Reviewed instructions x3 with pt and family at bedside, pt states she has no further questions at this time  Pt c/o nausea, I notified DONNA Lambert in regards to pt's nausea  Pt call bell, phone, tray and LSO bracing instructions left within reach at bedside  Ortho Tech will continue daily follow up  Recommendations:  Please contact Ortho Tech [phone] regarding bracing instructions/adjustments  Thank you!     Abelardo WILLIAMSON, Restorative Technician, [employer]

## 2018-09-03 NOTE — SOCIAL WORK
PT recommending RW for d/c  CM delivered RW to pt bedside  CM obtained signature and referral made to [facility] DME  Pt aware if medicare does not cover she may be billed  Pt reports no secondary insurance  Bedside RN, Leigh Wang, aware of d/c

## 2018-09-03 NOTE — TREATMENT PLAN
Upright x-rays were reviewed and demonstrated good alignment  Patient is recommended to follow up in 2 weeks with the neurosurgical group with repeat x-rays  She will continue to wear her brace when OOB and HOB >45degrees  Call with questions or concerns

## 2018-09-04 ENCOUNTER — TRANSITIONAL CARE MANAGEMENT (OUTPATIENT)
Dept: FAMILY MEDICINE CLINIC | Facility: CLINIC | Age: 63
End: 2018-09-04

## 2018-09-04 ENCOUNTER — TELEPHONE (OUTPATIENT)
Dept: FAMILY MEDICINE CLINIC | Facility: CLINIC | Age: 63
End: 2018-09-04

## 2018-09-04 ENCOUNTER — TELEPHONE (OUTPATIENT)
Dept: SURGERY | Facility: CLINIC | Age: 63
End: 2018-09-04

## 2018-09-04 DIAGNOSIS — S32.020D CLOSED COMPRESSION FRACTURE OF L2 LUMBAR VERTEBRA WITH ROUTINE HEALING, SUBSEQUENT ENCOUNTER: Primary | ICD-10-CM

## 2018-09-04 RX ORDER — OXYCODONE HYDROCHLORIDE 10 MG/1
10 TABLET ORAL EVERY 4 HOURS PRN
Qty: 20 TABLET | Refills: 0 | Status: SHIPPED | OUTPATIENT
Start: 2018-09-04 | End: 2018-09-06 | Stop reason: SDUPTHER

## 2018-09-04 NOTE — TELEPHONE ENCOUNTER
Patient was discharged on 9/3; compression fx, s/p fall  Patient was sent home with Oxycodone 5mg #15 and Methocarbamol  She states that she's in severe pain and the medication is not helping  The only thing that helps is laying flat on her back  The pain is worse since being discharged  Please call to discuss and adjust treatment plan

## 2018-09-04 NOTE — TELEPHONE ENCOUNTER
Filled Script for patient  Sent over to [facility]  I called patient and personally talked to her

## 2018-09-05 ENCOUNTER — TRANSITIONAL CARE MANAGEMENT (OUTPATIENT)
Dept: FAMILY MEDICINE CLINIC | Facility: CLINIC | Age: 63
End: 2018-09-05

## 2018-09-06 ENCOUNTER — OFFICE VISIT (OUTPATIENT)
Dept: FAMILY MEDICINE CLINIC | Facility: CLINIC | Age: 63
End: 2018-09-06
Payer: MEDICARE

## 2018-09-06 VITALS
HEART RATE: 88 BPM | OXYGEN SATURATION: 99 % | RESPIRATION RATE: 20 BRPM | DIASTOLIC BLOOD PRESSURE: 68 MMHG | SYSTOLIC BLOOD PRESSURE: 118 MMHG | BODY MASS INDEX: 21.69 KG/M2 | HEIGHT: 66 IN | WEIGHT: 135 LBS

## 2018-09-06 DIAGNOSIS — T40.2X5A CONSTIPATION DUE TO OPIOID THERAPY: ICD-10-CM

## 2018-09-06 DIAGNOSIS — S32.020D CLOSED COMPRESSION FRACTURE OF L2 LUMBAR VERTEBRA WITH ROUTINE HEALING, SUBSEQUENT ENCOUNTER: ICD-10-CM

## 2018-09-06 DIAGNOSIS — I10 BENIGN ESSENTIAL HYPERTENSION: ICD-10-CM

## 2018-09-06 DIAGNOSIS — F41.9 ANXIETY: ICD-10-CM

## 2018-09-06 DIAGNOSIS — K59.03 CONSTIPATION DUE TO OPIOID THERAPY: ICD-10-CM

## 2018-09-06 DIAGNOSIS — Z09 HOSPITAL DISCHARGE FOLLOW-UP: Primary | ICD-10-CM

## 2018-09-06 PROCEDURE — 99496 TRANSJ CARE MGMT HIGH F2F 7D: CPT | Performed by: FAMILY MEDICINE

## 2018-09-06 RX ORDER — OXYCODONE HYDROCHLORIDE 10 MG/1
10 TABLET ORAL EVERY 4 HOURS PRN
Qty: 30 TABLET | Refills: 0 | Status: SHIPPED | OUTPATIENT
Start: 2018-09-06 | End: 2018-09-19 | Stop reason: SDUPTHER

## 2018-09-06 RX ORDER — ALPRAZOLAM 0.5 MG/1
0.5 TABLET ORAL DAILY
Qty: 90 TABLET | Refills: 0 | Status: SHIPPED | OUTPATIENT
Start: 2018-09-06 | End: 2018-10-15 | Stop reason: ALTCHOICE

## 2018-09-06 RX ORDER — POLYETHYLENE GLYCOL 3350 17 G/17G
17 POWDER, FOR SOLUTION ORAL DAILY
Qty: 30 EACH | Refills: 0 | Status: SHIPPED | OUTPATIENT
Start: 2018-09-06 | End: 2020-04-23 | Stop reason: ALTCHOICE

## 2018-09-06 RX ORDER — LISINOPRIL 20 MG/1
20 TABLET ORAL DAILY
Qty: 90 TABLET | Refills: 0 | Status: SHIPPED | OUTPATIENT
Start: 2018-09-06 | End: 2018-12-06 | Stop reason: SDUPTHER

## 2018-09-06 NOTE — PROGRESS NOTES
Assessment/Plan:          Diagnoses and all orders for this visit:    Hospital discharge follow-up    Closed compression fracture of L2 lumbar vertebra with routine healing, subsequent encounter  -     oxyCODONE (ROXICODONE) 10 MG TABS; Take 1 tablet (10 mg total) by mouth every 4 (four) hours as needed for moderate pain Max Daily Amount: 60 mg    Benign essential hypertension  -     metoprolol tartrate (LOPRESSOR) 25 mg tablet; Take 1 tablet (25 mg total) by mouth every 12 (twelve) hours  -     lisinopril (ZESTRIL) 20 mg tablet; Take 1 tablet (20 mg total) by mouth daily    Constipation due to opioid therapy  -     polyethylene glycol (MIRALAX) 17 g packet; Take 17 g by mouth daily    Anxiety  -     ALPRAZolam (XANAX) 0 5 mg tablet; Take 1 tablet (0 5 mg total) by mouth daily for 90 days     repeat X ray  Seeing ortho in 2 weeks- she needs to call for an appt    Subjective:     Patient ID: Kaye Chen is a 70 y o  female  Constipation since she is taking Oxycodone   No abdominal pain or bleeding   Slipped and fracture L2 lumbar spine   Currently wearing a back brace   Transitional Care Management Review:  Kaye Chen is a 70 y o  female here for TCM follow up  During the TCM phone call patient stated:    Date and time hospital follow up call was made:  9/4/2018 11:12 AM  Patient was hopsitalized at:  [facility]  Date of admission:  9/2/18  Date of discharge:  9/3/18  Diagnosis:  compression fracture lower lumbar L2  Disposition:  Home  I have advised the patient to call PCP with any new or worsening symptoms (please type in name along with any credentials):  LUDA govea MD      Hypertension   This is a chronic problem  The current episode started more than 1 year ago  The problem has been gradually improving since onset  The problem is controlled   Pertinent negatives include no anxiety, blurred vision, chest pain, headaches, malaise/fatigue, neck pain, orthopnea, palpitations, peripheral edema, PND, shortness of breath or sweats  There are no associated agents to hypertension  Past treatments include ACE inhibitors  There is no history of angina, kidney disease or CAD/MI  There is no history of chronic renal disease, coarctation of the aorta, a hypertension causing med, pheochromocytoma or renovascular disease  Back Pain   This is a new problem  The current episode started 1 to 4 weeks ago  The problem has been gradually improving since onset  The pain is present in the lumbar spine  Pertinent negatives include no chest pain or headaches  Review of Systems   Constitutional: Negative for malaise/fatigue  Eyes: Negative for blurred vision  Respiratory: Negative for shortness of breath  Cardiovascular: Negative for chest pain, palpitations, orthopnea and PND  Musculoskeletal: Positive for back pain  Negative for neck pain  Neurological: Negative for headaches  Past Medical History:   Diagnosis Date    Hypertension      No past surgical history on file  Social History     Social History    Marital status:      Spouse name: N/A    Number of children: N/A    Years of education: N/A     Occupational History    Not on file       Social History Main Topics    Smoking status: Never Smoker    Smokeless tobacco: Never Used      Comment: former smoker per allscripts,pack a day for about 20 years, quit 10 years ago    Alcohol use 1 2 oz/week     2 Glasses of wine per week      Comment: daily    Drug use: No    Sexual activity: Yes     Partners: Male     Other Topics Concern    Not on file     Social History Narrative    No narrative on file     Allergies   Allergen Reactions    Penicillins          Family History   Problem Relation Age of Onset    Coronary artery disease Mother         early   Lacy Alonso Coronary artery disease Father         early           Current Outpatient Prescriptions:     acetaminophen (TYLENOL) 325 mg tablet, Take 2 tablets (650 mg total) by mouth every 6 (six) hours as needed for mild pain, Disp: 30 tablet, Rfl: 0    ALPRAZolam (XANAX) 0 5 mg tablet, Take 1 tablet (0 5 mg total) by mouth daily for 90 days, Disp: 90 tablet, Rfl: 0    aspirin 81 MG tablet, Take 81 mg by mouth daily, Disp: , Rfl:     lisinopril (ZESTRIL) 20 mg tablet, Take 1 tablet (20 mg total) by mouth daily, Disp: 90 tablet, Rfl: 0    methocarbamol (ROBAXIN) 500 mg tablet, Take 1 tablet (500 mg total) by mouth every 6 (six) hours, Disp: 25 tablet, Rfl: 0    oxyCODONE (ROXICODONE) 10 MG TABS, Take 1 tablet (10 mg total) by mouth every 4 (four) hours as needed for moderate pain Max Daily Amount: 60 mg, Disp: 30 tablet, Rfl: 0    metoprolol tartrate (LOPRESSOR) 25 mg tablet, Take 1 tablet (25 mg total) by mouth every 12 (twelve) hours, Disp: 180 tablet, Rfl: 0    polyethylene glycol (MIRALAX) 17 g packet, Take 17 g by mouth daily, Disp: 30 each, Rfl: 0                        Objective:  Vitals:    09/06/18 1457   BP: 118/68   BP Location: Left arm   Patient Position: Sitting   Cuff Size: Standard   Pulse: 88   Resp: 20   SpO2: 99%   Weight: 61 2 kg (135 lb)   Height: 5' 6" (1 676 m)      Physical Exam   Constitutional: She is oriented to person, place, and time  She appears well-developed and well-nourished  Eyes: EOM are normal  Pupils are equal, round, and reactive to light  Cardiovascular: Normal rate and regular rhythm  Pulmonary/Chest: Effort normal and breath sounds normal    Musculoskeletal: She exhibits tenderness  L2 spine    Neurological: She is alert and oriented to person, place, and time  Psychiatric: She has a normal mood and affect   Her behavior is normal

## 2018-09-07 ENCOUNTER — TELEPHONE (OUTPATIENT)
Dept: GASTROENTEROLOGY | Facility: CLINIC | Age: 63
End: 2018-09-07

## 2018-09-07 NOTE — TELEPHONE ENCOUNTER
todd patient      She has colon on 9-17-18 and needs to cancel at this time due to a back injury---will call us [facility] when ready

## 2018-09-10 ENCOUNTER — TELEPHONE (OUTPATIENT)
Dept: NEUROSURGERY | Facility: CLINIC | Age: 63
End: 2018-09-10

## 2018-09-10 DIAGNOSIS — S32.020A CLOSED COMPRESSION FRACTURE OF SECOND LUMBAR VERTEBRA, INITIAL ENCOUNTER: Primary | ICD-10-CM

## 2018-09-10 RX ORDER — METHOCARBAMOL 500 MG/1
500 TABLET, FILM COATED ORAL 3 TIMES DAILY
Qty: 90 TABLET | Refills: 0 | Status: SHIPPED | OUTPATIENT
Start: 2018-09-10 | End: 2018-09-11

## 2018-09-10 NOTE — TELEPHONE ENCOUNTER
Pt called as instructed at [facility] d/c to schedule f/u appt  She was to f/u in 2 weeks but prefers to extend a little further due to secondary insurance issues  She is doing well and wearing brace as instructed  appt offered and accepted  She is aware she needs an xray for the visit and will obtain the week before  She was encouraged to call the office or present to the ED with any neurological changes  She was in agreement

## 2018-09-10 NOTE — TELEPHONE ENCOUNTER
REFILL methocarbamol (ROBAXIN) 500 mg tablet   methocarbamol (ROBAXIN) 500 mg tablet  PLEASE SEND TO [facility] [address]  STORE 0820 [phone]  PATIENT IS REQUESTING A 30 DAY SUPPLY  SHE IS ALSO DOWN TO HER LAST 2 PILLS AND TAKES THEM EVERY 6 HOURS   PLEASE ADVISE

## 2018-09-11 ENCOUNTER — TELEPHONE (OUTPATIENT)
Dept: FAMILY MEDICINE CLINIC | Facility: CLINIC | Age: 63
End: 2018-09-11

## 2018-09-11 DIAGNOSIS — M54.6 ACUTE THORACIC BACK PAIN, UNSPECIFIED BACK PAIN LATERALITY: Primary | ICD-10-CM

## 2018-09-11 RX ORDER — METHOCARBAMOL 750 MG/1
750 TABLET, FILM COATED ORAL EVERY 6 HOURS PRN
Qty: 120 TABLET | Refills: 0 | Status: SHIPPED | OUTPATIENT
Start: 2018-09-11 | End: 2018-09-11 | Stop reason: SDUPTHER

## 2018-09-11 RX ORDER — METHOCARBAMOL 750 MG/1
750 TABLET, FILM COATED ORAL EVERY 6 HOURS PRN
Qty: 120 TABLET | Refills: 0 | Status: SHIPPED | OUTPATIENT
Start: 2018-09-11 | End: 2018-10-15 | Stop reason: SDUPTHER

## 2018-09-11 NOTE — TELEPHONE ENCOUNTER
RICCO WENT TO PHARMACY TO  HER methocarbamol (ROBAXIN) 500 mg tablet  [facility] DOES NOT HAVE THEM IN STOCK SO SHE REACHED OUT TO OTHER PHARMACIES AND THEY ARE ALL OUT AS WELL  [facility] STATED THEY DO HAVE  MG IF YOU WOULD BE WILLING TO CHANGE HER SCRIPT AND I GUESS THE SIG AS WELL  PLEASE ADVISE

## 2018-09-11 NOTE — TELEPHONE ENCOUNTER
Sherre Soulier YES   I SPOKE WITH [facility]   I WOULD NEED THE QTY AND SIG FIORDALIZA

## 2018-09-19 DIAGNOSIS — S32.020D CLOSED COMPRESSION FRACTURE OF L2 LUMBAR VERTEBRA WITH ROUTINE HEALING, SUBSEQUENT ENCOUNTER: Primary | ICD-10-CM

## 2018-09-19 RX ORDER — OXYCODONE HYDROCHLORIDE 10 MG/1
TABLET ORAL
Qty: 30 TABLET | Refills: 0 | Status: SHIPPED | OUTPATIENT
Start: 2018-09-19 | End: 2018-10-03 | Stop reason: SDUPTHER

## 2018-09-26 ENCOUNTER — HOSPITAL ENCOUNTER (OUTPATIENT)
Dept: RADIOLOGY | Facility: HOSPITAL | Age: 63
Discharge: HOME | End: 2018-09-26
Payer: MEDICARE

## 2018-09-26 ENCOUNTER — TRANSCRIBE ORDERS (OUTPATIENT)
Dept: RADIOLOGY | Facility: HOSPITAL | Age: 63
End: 2018-09-26

## 2018-09-26 DIAGNOSIS — S32.020A CLOSED COMPRESSION FRACTURE OF SECOND LUMBAR VERTEBRA, INITIAL ENCOUNTER: ICD-10-CM

## 2018-09-26 PROCEDURE — 72100 X-RAY EXAM L-S SPINE 2/3 VWS: CPT

## 2018-10-01 ENCOUNTER — OFFICE VISIT (OUTPATIENT)
Dept: NEUROSURGERY | Facility: CLINIC | Age: 63
End: 2018-10-01
Payer: MEDICARE

## 2018-10-01 VITALS
SYSTOLIC BLOOD PRESSURE: 125 MMHG | DIASTOLIC BLOOD PRESSURE: 78 MMHG | HEIGHT: 66 IN | HEART RATE: 64 BPM | TEMPERATURE: 98.8 F | WEIGHT: 131.2 LBS | BODY MASS INDEX: 21.08 KG/M2

## 2018-10-01 DIAGNOSIS — S32.020G CLOSED COMPRESSION FRACTURE OF L2 LUMBAR VERTEBRA WITH DELAYED HEALING, SUBSEQUENT ENCOUNTER: Primary | ICD-10-CM

## 2018-10-01 PROCEDURE — 99213 OFFICE O/P EST LOW 20 MIN: CPT | Performed by: PHYSICIAN ASSISTANT

## 2018-10-01 RX ORDER — PHENOL 1.4 %
1000 AEROSOL, SPRAY (ML) MUCOUS MEMBRANE DAILY
COMMUNITY

## 2018-10-01 RX ORDER — MULTIVIT-MIN/IRON FUM/FOLIC AC 7.5 MG-4
1 TABLET ORAL DAILY
COMMUNITY

## 2018-10-01 NOTE — LETTER
October 2, 2018     Mickie Stuart MD  [address]  [address] [facility] [address]    Patient: Denise Vazquez   YOB: 1955   Date of Visit: 10/1/2018       Dear Dr Sole Smith:    Thank you for referring Denise Vazquez to me for evaluation  Below are my notes for this consultation  If you have questions, please do not hesitate to call me  I look forward to following your patient along with you  Sincerely,        Jose M Chavarria PA-C        CC: No Recipients  Jose M Chavarria PA-C  10/2/2018  3:23 AM  Sign at close encounter  Assessment/Plan:       Diagnoses and all orders for this visit:    Closed compression fracture of L2 lumbar vertebra with delayed healing, subsequent encounter  -     Ambulatory referral to [facility]; Future  -     X-ray lumbar spine 2 or 3 views; Future    Other orders  -     Multiple Vitamins-Minerals (MULTIVITAMIN WITH MINERALS) tablet; Take 1 tablet by mouth daily  -     calcium carbonate (OS-FELICIA) 600 MG tablet; Take 1,000 mg by mouth daily          Discussion / Summary: This is a 70 y o  female who presents for hospital consult ([phone]) follow up of  L2 compression fracture  Patient has been maintained in LSO  back brace for approximately 4 weeks  Lumbar spine xray ( 9/26/18 ) reports slightly increased L2 compression fracture with increased sclerosis compare to prior xray 9/3/18  Alignment is unremarkable  Clinically she reports her LBP is overall improved since hospitalization and she denies back pain currently  Briefly discussed Kyphoplasty with patient but she agrees to continue with management in back brace as overall feeling better and pain managed with brace and medication  She may remove LSO back brace to shower  Otherwise recommend she wear LSO back brace when head of bed greater then 45 degrees and out of bed  Recommend she refrain from strenuous activity  Recommend she refrain from bending and twisting back    Recommend she limit lifting, pulling, pushing to no more then 10 lbs  No driving while being treated in back brace  Recommend she take fall precautions and refrain from activity that increases chance of fall or injury to back  Advised to contact our office or present to Emergency Room if experience worsening or new back pain, sensory or motor change, bladder or bowel dysfunction, or other neurological change  Recommend follow up in 2 weeks with lumbar spine xray  to be completed a few days prior to follow up visit  She is advised follow up with PCP for eval and treat for possible osteoporosis  Also recommend she follow up with Primary Care Provider for padmini/management of left forearm pain (wearing brace)  Defer to PCP to consider orthopedic evaluation  Patient is to contact our office or present to hospital Emergency Room if experience worsening or new back pain, sensory or motor change, bladder or bowel dysfunction, or other neurological change  Patient  expressed understanding and agreement       _______________________________________________________________________________________  Subjective:      Patient ID: Denise Vazquez is a 70 y o  female who presents for hospital consult ([phone]) follow up of  L2 compression fracture  She is s/p L-spine xray 9/26/18  She is accompanied with her significant other  HPI  In review -- patient was hospitalized at [facility] 9/2/18 s/p fall onto her buttock while going down a ramp  She has immediate low back and left upper iliac crest pain  She presented to the emergency department  During work up she was found to have L2 compression fracture  She was seen in consultation by Dr Roman Gardiner for the L2 fracture and advised management with LSO brace  She reports wearing brace for some left forearm pain -- xray negative for acute osseous abnormality left wrist     She reports compliance with wearing LSO brace  She reports LBP is overall improved since hospitalization  She reports she can experience occasional pressure like low back pain but denies back pain currently at this moment  Aggravating factor is bending, being up durations, and first thing in morning  She reports laying down is alleviating factor  Also repots taking Tylenol, Ibuprofen, Oxycodone 10mg (1 tab q 8-12 hrs prn) , and methocarbamol 750mg (1 tab q 6 hrs prn) are alleviating factors  She does report some tightness of her right hip but denies radicular pain down lower extremities  She denies numbness, tingling, weakness of her lower extremities  She ambulates independent denies any issues walking  She denies bladder or bowel incontinence  She does report some history of constipation with oxycodone which is managed with MiraLax  The following portions of the patient's history were reviewed and updated as appropriate: allergies, current medications, past family history, past medical history, past social history and past surgical history  Review of Systems   Constitutional: Negative for fever  HENT: Negative  Eyes: Negative for visual disturbance  Wears glasses   Respiratory: Negative for shortness of breath  Cardiovascular: Negative for chest pain  Gastrointestinal:        Denies bowel dysfunction   Genitourinary: Negative for difficulty urinating  Musculoskeletal: Negative for back pain  Neurological: Negative for weakness and numbness  Psychiatric/Behavioral: Negative for agitation  Objective:      /78 (BP Location: Right arm, Patient Position: Sitting, Cuff Size: Standard)   Pulse 64   Temp 98 8 °F (37 1 °C) (Temporal)   Ht 5' 6" (1 676 m)   Wt 59 5 kg (131 lb 3 2 oz)   BMI 21 18 kg/m²           Physical Exam   Constitutional: She is oriented to person, place, and time  She appears well-developed and well-nourished  No distress  Wearing LSO brace  Wearing left wrist/forearm brace     Eyes: Conjunctivae are normal    Pulmonary/Chest: Effort normal  Musculoskeletal:        Lumbar back: She exhibits no bony tenderness and no deformity  Neurological: She is alert and oriented to person, place, and time  Gait normal    Reflex Scores:       Tricep reflexes are 1+ on the right side and 2+ on the left side  Bicep reflexes are 2+ on the right side and 2+ on the left side  Brachioradialis reflexes are 2+ on the right side and 2+ on the left side  Patellar reflexes are 2+ on the right side and 2+ on the left side  Achilles reflexes are 2+ on the right side and 2+ on the left side  Skin: Skin is warm  Psychiatric: She has a normal mood and affect  Her speech is normal        Neurologic Exam     Mental Status   Oriented to person, place, and time  Speech: speech is normal   Level of consciousness: alert    Motor Exam     Motor:  Shoulder abduction 5/5 bilaterally  Elbow flexion/extension 5/5 bilaterally  Wrist flexion/extension right 5/5 and deferred on left  Finger  / abduction 5/5 bilaterally  Hip flexion/abduction/adduction 5/5 bilaterally  Knee flexion/extension 5/5 bilaterally  Ankle DF/PF 5/5 bilaterally  Great toe DF 5/5 bilaterally  Sensory Exam     Sensation to pinprick intact b/l upper extremities, torso, and b/l lower extremities  JPS and Vibratory sense intact b/l thumbs and great toes         Gait, Coordination, and Reflexes     Gait  Gait: normal (Independent )    Reflexes   Right brachioradialis: 2+  Left brachioradialis: 2+  Right biceps: 2+  Left biceps: 2+  Right triceps: 1+  Left triceps: 2+  Right patellar: 2+  Left patellar: 2+  Right achilles: 2+  Left achilles: 2+  Right plantar: normal  Left plantar: normal  Right ankle clonus: absent  Left ankle clonus: absent       Imaging study:    9/26/[facility] L-spine xray -- per report: " LUMBAR SPINE     INDICATION:   S32 [id number]: Wedge compression fracture of second lumbar vertebra, initial encounter for closed fracture      COMPARISON: 9/3/2018     VIEWS:  XR SPINE LUMBAR 2 OR 3 VIEWS INJURY        FINDINGS: Osteopenia      Slightly increased L2 compression fracture with increased sclerosis      Alignment is unremarkable      Age-appropriate lumbar degenerative changes are seen      The pedicles appear intact      Soft tissues are unremarkable      IMPRESSION:     Slightly increased L2 compression fracture with increased sclerosis          Workstation performed: [id number] "

## 2018-10-01 NOTE — PATIENT INSTRUCTIONS
May remove LSO back brace to shower  Otherwise wear LSO back brace when head of bed greater then 45 degrees and out of bed  Refrain from strenuous activity  Refrain from bending and twisting back  Limit lifting, pulling, pushing to no more then 10 lbs  No driving while being treated in back brace  Recommend you take fall precautions and refrain from activity that increases chance of fall or injury to back  Contact our office or present to Emergency Room if experience worsening or new back pain, sensory or motor change, bladder or bowel dysfunction, or other neurological change  Recommend you follow up with your Primary Care Provider for evaluation/management of possible osteoporosis  Also recommend follow up with your Primary Care Provider for eval of left arm

## 2018-10-01 NOTE — PROGRESS NOTES
Assessment/Plan:       Diagnoses and all orders for this visit:    Closed compression fracture of L2 lumbar vertebra with delayed healing, subsequent encounter  -     Ambulatory referral to [facility]; Future  -     X-ray lumbar spine 2 or 3 views; Future    Other orders  -     Multiple Vitamins-Minerals (MULTIVITAMIN WITH MINERALS) tablet; Take 1 tablet by mouth daily  -     calcium carbonate (OS-FELICIA) 600 MG tablet; Take 1,000 mg by mouth daily          Discussion / Summary: This is a 70 y o  female who presents for hospital consult ([phone]) follow up of  L2 compression fracture  Patient has been maintained in LSO  back brace for approximately 4 weeks  Lumbar spine xray ( 9/26/18 ) reports slightly increased L2 compression fracture with increased sclerosis compare to prior xray 9/3/18  Alignment is unremarkable  Clinically she reports her LBP is overall improved since hospitalization and she denies back pain currently  Briefly discussed Kyphoplasty with patient but she agrees to continue with management in back brace as overall feeling better and pain managed with brace and medication  She may remove LSO back brace to shower  Otherwise recommend she wear LSO back brace when head of bed greater then 45 degrees and out of bed  Recommend she refrain from strenuous activity  Recommend she refrain from bending and twisting back  Recommend she limit lifting, pulling, pushing to no more then 10 lbs  No driving while being treated in back brace  Recommend she take fall precautions and refrain from activity that increases chance of fall or injury to back  Advised to contact our office or present to Emergency Room if experience worsening or new back pain, sensory or motor change, bladder or bowel dysfunction, or other neurological change  Recommend follow up in 2 weeks with lumbar spine xray  to be completed a few days prior to follow up visit      She is advised follow up with PCP for eval and treat for possible osteoporosis  Also recommend she follow up with Primary Care Provider for padmini/management of left forearm pain (wearing brace)  Defer to PCP to consider orthopedic evaluation  Patient is to contact our office or present to hospital Emergency Room if experience worsening or new back pain, sensory or motor change, bladder or bowel dysfunction, or other neurological change  Patient  expressed understanding and agreement       _______________________________________________________________________________________  Subjective:      Patient ID: Ulysses Cap is a 70 y o  female who presents for hospital consult ([phone]) follow up of  L2 compression fracture  She is s/p L-spine xray 9/26/18  She is accompanied with her significant other  HPI  In review -- patient was hospitalized at [facility] 9/2/18 s/p fall onto her buttock while going down a ramp  She has immediate low back and left upper iliac crest pain  She presented to the emergency department  During work up she was found to have L2 compression fracture  She was seen in consultation by Dr Ritesh Marques for the L2 fracture and advised management with LSO brace  She reports wearing brace for some left forearm pain -- xray negative for acute osseous abnormality left wrist     She reports compliance with wearing LSO brace  She reports LBP is overall improved since hospitalization  She reports she can experience occasional pressure like low back pain but denies back pain currently at this moment  Aggravating factor is bending, being up durations, and first thing in morning  She reports laying down is alleviating factor  Also repots taking Tylenol, Ibuprofen, Oxycodone 10mg (1 tab q 8-12 hrs prn) , and methocarbamol 750mg (1 tab q 6 hrs prn) are alleviating factors  She does report some tightness of her right hip but denies radicular pain down lower extremities    She denies numbness, tingling, weakness of her lower extremities  She ambulates independent denies any issues walking  She denies bladder or bowel incontinence  She does report some history of constipation with oxycodone which is managed with MiraLax  The following portions of the patient's history were reviewed and updated as appropriate: allergies, current medications, past family history, past medical history, past social history and past surgical history  Review of Systems   Constitutional: Negative for fever  HENT: Negative  Eyes: Negative for visual disturbance  Wears glasses   Respiratory: Negative for shortness of breath  Cardiovascular: Negative for chest pain  Gastrointestinal:        Denies bowel dysfunction   Genitourinary: Negative for difficulty urinating  Musculoskeletal: Negative for back pain  Neurological: Negative for weakness and numbness  Psychiatric/Behavioral: Negative for agitation  Objective:      /78 (BP Location: Right arm, Patient Position: Sitting, Cuff Size: Standard)   Pulse 64   Temp 98 8 °F (37 1 °C) (Temporal)   Ht 5' 6" (1 676 m)   Wt 59 5 kg (131 lb 3 2 oz)   BMI 21 18 kg/m²          Physical Exam   Constitutional: She is oriented to person, place, and time  She appears well-developed and well-nourished  No distress  Wearing LSO brace  Wearing left wrist/forearm brace  Eyes: Conjunctivae are normal    Pulmonary/Chest: Effort normal    Musculoskeletal:        Lumbar back: She exhibits no bony tenderness and no deformity  Neurological: She is alert and oriented to person, place, and time  Gait normal    Reflex Scores:       Tricep reflexes are 1+ on the right side and 2+ on the left side  Bicep reflexes are 2+ on the right side and 2+ on the left side  Brachioradialis reflexes are 2+ on the right side and 2+ on the left side  Patellar reflexes are 2+ on the right side and 2+ on the left side         Achilles reflexes are 2+ on the right side and 2+ on the left side  Skin: Skin is warm  Psychiatric: She has a normal mood and affect  Her speech is normal        Neurologic Exam     Mental Status   Oriented to person, place, and time  Speech: speech is normal   Level of consciousness: alert    Motor Exam     Motor:  Shoulder abduction 5/5 bilaterally  Elbow flexion/extension 5/5 bilaterally  Wrist flexion/extension right 5/5 and deferred on left  Finger  / abduction 5/5 bilaterally  Hip flexion/abduction/adduction 5/5 bilaterally  Knee flexion/extension 5/5 bilaterally  Ankle DF/PF 5/5 bilaterally  Great toe DF 5/5 bilaterally  Sensory Exam     Sensation to pinprick intact b/l upper extremities, torso, and b/l lower extremities  JPS and Vibratory sense intact b/l thumbs and great toes  Gait, Coordination, and Reflexes     Gait  Gait: normal (Independent )    Reflexes   Right brachioradialis: 2+  Left brachioradialis: 2+  Right biceps: 2+  Left biceps: 2+  Right triceps: 1+  Left triceps: 2+  Right patellar: 2+  Left patellar: 2+  Right achilles: 2+  Left achilles: 2+  Right plantar: normal  Left plantar: normal  Right ankle clonus: absent  Left ankle clonus: absent       Imaging study:    9/26/18 [facility] L-spine xray -- per report: " LUMBAR SPINE     INDICATION:   S32 020A: Wedge compression fracture of second lumbar vertebra, initial encounter for closed fracture      COMPARISON:  9/3/2018     VIEWS:  XR SPINE LUMBAR 2 OR 3 VIEWS INJURY        FINDINGS: Osteopenia      Slightly increased L2 compression fracture with increased sclerosis      Alignment is unremarkable      Age-appropriate lumbar degenerative changes are seen      The pedicles appear intact      Soft tissues are unremarkable      IMPRESSION:     Slightly increased L2 compression fracture with increased sclerosis          Workstation performed: [id number] "

## 2018-10-02 ENCOUNTER — TELEPHONE (OUTPATIENT)
Dept: FAMILY MEDICINE CLINIC | Facility: CLINIC | Age: 63
End: 2018-10-02

## 2018-10-02 NOTE — TELEPHONE ENCOUNTER
ON OV 09/06/2018 YOU HAD DISCUSSED PATIENT HAVING A DEXA SCAN AND AFTER SEEING HER NEUROLOGIST TODAY THEY ARE ALSO REQUESTED [facility]CO TO COMPLETE THE SCAN  SO HER QUESTION TO YOU IS , IS ANOTHER OV NEEDED OR CAN YOU PLACE AN ORDER FOR HER TO HAVE IT COMPLETED  PLEASE ADVISE    SECOND AT THE SAME TIME SHE HAD A SPINE FRACTURE FROM A FALL SHE INJURED HER WRIST   SHE IS NOW REQUESTING AN ORDER BE PLACED FOR AN ORTHOPEDIC AS WELL  THIRD   SHE WOULD LIKE HER oxyCODONE (ROXICODONE) 10 MG TABS Sig: Take 1-2 tabs every 4 hours as needed  Patient taking differently: Take 10 mg by mouth as needed Take 1-2 tabs every 4 hours as needed   LAST OV 09/06/2018 PDMP CHECKED 10/02/2018   LAST FILL 09/19/2018 QTY 30 DAYS SUPPLY 3 DR OSBORNE

## 2018-10-03 DIAGNOSIS — S32.020D CLOSED COMPRESSION FRACTURE OF L2 LUMBAR VERTEBRA WITH ROUTINE HEALING, SUBSEQUENT ENCOUNTER: Primary | ICD-10-CM

## 2018-10-03 DIAGNOSIS — M25.532 LEFT WRIST PAIN: ICD-10-CM

## 2018-10-03 DIAGNOSIS — M80.08XA AGE-RELATED OSTEOPOROSIS WITH CURRENT PATHOLOGICAL FRACTURE OF VERTEBRA, INITIAL ENCOUNTER (HCC): ICD-10-CM

## 2018-10-03 RX ORDER — OXYCODONE HYDROCHLORIDE 10 MG/1
TABLET ORAL
Qty: 30 TABLET | Refills: 0 | Status: SHIPPED | OUTPATIENT
Start: 2018-10-03 | End: 2018-10-16 | Stop reason: SDUPTHER

## 2018-10-03 RX ORDER — OXYCODONE HYDROCHLORIDE 10 MG/1
TABLET ORAL
Qty: 30 TABLET | Refills: 0 | Status: SHIPPED | OUTPATIENT
Start: 2018-10-03 | End: 2018-10-03 | Stop reason: SDUPTHER

## 2018-10-03 NOTE — TELEPHONE ENCOUNTER
oxyCODONE  WAS TO GO TO [facility] ( IN NOTES)  I WILL REACH OUT TO THEM NOW AND CANCEL

## 2018-10-03 NOTE — TELEPHONE ENCOUNTER
Patient is taking 1 tab every 4 hours   Requesting Qty of 30   As far as her orthopedic order it would be for her left wrist     Please Advise

## 2018-10-11 ENCOUNTER — TELEPHONE (OUTPATIENT)
Dept: NEUROSURGERY | Facility: CLINIC | Age: 63
End: 2018-10-11

## 2018-10-11 ENCOUNTER — HOSPITAL ENCOUNTER (OUTPATIENT)
Dept: RADIOLOGY | Facility: HOSPITAL | Age: 63
Discharge: HOME | End: 2018-10-11
Payer: MEDICARE

## 2018-10-11 DIAGNOSIS — S32.020G CLOSED COMPRESSION FRACTURE OF L2 LUMBAR VERTEBRA WITH DELAYED HEALING, SUBSEQUENT ENCOUNTER: ICD-10-CM

## 2018-10-11 PROCEDURE — 72100 X-RAY EXAM L-S SPINE 2/3 VWS: CPT

## 2018-10-15 ENCOUNTER — OFFICE VISIT (OUTPATIENT)
Dept: NEUROSURGERY | Facility: CLINIC | Age: 63
End: 2018-10-15
Payer: MEDICARE

## 2018-10-15 VITALS
RESPIRATION RATE: 16 BRPM | HEART RATE: 60 BPM | WEIGHT: 125.6 LBS | SYSTOLIC BLOOD PRESSURE: 148 MMHG | TEMPERATURE: 98 F | DIASTOLIC BLOOD PRESSURE: 82 MMHG | BODY MASS INDEX: 20.18 KG/M2 | HEIGHT: 66 IN

## 2018-10-15 DIAGNOSIS — S32.020D CLOSED COMPRESSION FRACTURE OF L2 LUMBAR VERTEBRA WITH ROUTINE HEALING, SUBSEQUENT ENCOUNTER: Primary | ICD-10-CM

## 2018-10-15 DIAGNOSIS — M54.6 ACUTE THORACIC BACK PAIN, UNSPECIFIED BACK PAIN LATERALITY: Primary | ICD-10-CM

## 2018-10-15 PROCEDURE — 99213 OFFICE O/P EST LOW 20 MIN: CPT | Performed by: PHYSICIAN ASSISTANT

## 2018-10-15 RX ORDER — METHOCARBAMOL 750 MG/1
750 TABLET, FILM COATED ORAL EVERY 6 HOURS PRN
Qty: 270 TABLET | Refills: 0 | Status: SHIPPED | OUTPATIENT
Start: 2018-10-15 | End: 2018-10-16 | Stop reason: SDUPTHER

## 2018-10-15 NOTE — LETTER
October 16, 2018     Zohaib Hernandez MD  [address] [address]    Patient: Gerard Pichardo   YOB: 1955   Date of Visit: 10/15/2018       Dear Dr Lidia Wagner:    Thank you for referring Gerard Pichardo to me for evaluation  Below are my notes for this consultation  If you have questions, please do not hesitate to call me  I look forward to following your patient along with you  Sincerely,        Princess Figueredo PA-C        CC: No Recipients  Princess Figueredo PA-C  10/16/2018  9:53 AM  Sign at close encounter  Assessment/Plan:     Diagnoses and all orders for this visit:    Closed compression fracture of L2 lumbar vertebra with routine healing, subsequent encounter  -     X-ray lumbar spine 2 or 3 views; Future          Discussion / Summary: This is a 70 y o  female who presents for follow up of L2 compression fracture  Ms Cali Washington has been maintained in LSO  back brace for approximately 6 weeks  She reports compliance with wearing LSO brace  Lumbar spine xray ( 10/11/18) demonstrates stable compression deformity L2 vertebra compared to prior lumbar xray (9/26/18)  Reviewed lumbar xray imaging with Ms Garcia along with prior imaging ( lumbar xray 9/3/18; lumbar CT 9/2/18)  She continues with intermittent low back pain but overall her low back pain is a little bit improved in interval since last visit  Discussed with patient continued management with LSO brace verses returning with Dr Evangelista Malin to discuss possible Kyphoplasty  Ms Cali Washington is not interested in kyphoplasty at this juncture  Mr Cali Washington wishes to continue with management with back brace  Discussed with patient that she may remove LSO back brace to shower but otherwise she is recommended to wear LSO back brace when head of bed greater then 45 degrees and out of bed  Reviewed with patient how to apply / wear the LSO brace so fits appropriately snug     Recommend she refrain from strenuous activity and avoid bending and twisting back  Recommend she limit lifting, pulling, pushing to no more then 5-10 lbs  No driving while being treated in back brace  Recommend she take fall precautions and refrain from activity that increases chance of fall or injury to back  She is recommended to follow up in 4 weeks with L-spine xray to be completed a few days prior to follow up visit  Patient advised to contact our office or present to Emergency Room if experience worsening or new back pain, sensory or motor change, bladder or bowel dysfunction, or other neurological change  Patient expressed understanding and agreement     _______________________________________________________________________________________  Subjective:      Patient ID: Dale Ronquillo is a 70 y o  female who presents for follow up of L2 compression fracture  Ms Leslie Felipe has been maintained in LSO  back brace for approximately 6 weeks  She underwent L-spine xray 10/11/18  She is accompanied at office visit with her significant other  HPI  In review -- Ms Leslie Felipe was hospitalized at [facility] 9/2/2018 status post fall onto her buttock while going down a ramp  She had immediate low back pain and left upper iliac crest pain  She presentd to the emergency department for evaluation  During workup she was found have an L2 compression fracture  She was seen in consultation by Dr Alejandra Joel for the L2 fracture and was advised management with LSO brace  She has also been wearing a brace for left forearm pain and indicates she is to see an orthopedic provider in near future  She reports compliance with wearing LSO brace although thinks brace might be loose at times  She reports her LBP is overall a little bit better since last visit  She denies back pain currently  She reports she last experienced low back pain last night approximately 12 hours after taking a dose of oxycodone     She describes her LBP as a pressure like pain in the middle of her lower back  When LBP occurs it may go up to 8/10 on pain scale  She denies lower extremity pain  She denies numbness, tingling, or weakness of her lower extremities  She ambulates independent and reports she has been up and moving about more  She denies bladder or bowel dysfunction or saddle paresthesias  She reports taking Oxycodone 1 tab about q 8 hrs prn although indicates has gone to 12 hrs between doses  Also reports she take Tylenol ES 1 tab q 6 hrs an Methocarbamol 1 tab q 6 hrs  Pain medication being managed by PCP  The following portions of the patient's history were reviewed and updated as appropriate: allergies, current medications, past family history, past medical history, past social history and past surgical history  Review of Systems   Constitutional: Negative for fever  Respiratory: Negative for shortness of breath  Gastrointestinal:        Denies bowel dysfunction    Genitourinary: Negative for difficulty urinating  Musculoskeletal: Negative for gait problem  No back pain currently  Last experiences back pain last night   Neurological: Negative for weakness and numbness  Psychiatric/Behavioral: Negative for agitation  Objective:      /82 (BP Location: Left arm, Patient Position: Sitting, Cuff Size: Standard)   Pulse 60   Temp 98 °F (36 7 °C) (Tympanic)   Resp 16   Ht 5' 6" (1 676 m)   Wt 57 kg (125 lb 9 6 oz)   BMI 20 27 kg/m²           Physical Exam   Constitutional: She appears well-developed and well-nourished  No distress  Wearing LSO brace  Wearing brace on left forearm/hand  Eyes: Conjunctivae are normal    Pulmonary/Chest: Effort normal    Musculoskeletal:        Thoracic back: She exhibits no tenderness and no deformity  Lumbar back: She exhibits no tenderness and no deformity     Neurological: Gait normal    Reflex Scores:       Tricep reflexes are 2+ on the right side and 2+ on the left side  Bicep reflexes are 2+ on the right side and 1+ on the left side  Brachioradialis reflexes are 2+ on the right side and 2+ on the left side  Patellar reflexes are 2+ on the right side and 2+ on the left side  Achilles reflexes are Right achilles reflex grade: +1-2  and Left achilles reflex grade: +1-2     Skin: Skin is warm and dry  Psychiatric: She has a normal mood and affect  Her speech is normal        Neurologic Exam     Mental Status   Speech: speech is normal   Level of consciousness: alert    Motor Exam     Motor:  Shoulder abduction 5/5 bilaterally  Elbow flexion/extension 5/5 bilaterally  Right Wrist flexion/extension 5/5  (Deferred left wrist flex/ext in setting of history of left wrist region injury)  Finger  / abduction 5/5 bilaterally  Hip flexion/abduction/adduction 5/5 bilaterally  Knee flexion/extension 5/5 bilaterally  Ankle DF/PF 5/5 bilaterally  Great toe DF 5/5 bilaterally  Sensory Exam     Sensation to pinprick intact b/l upper extremities, torso, and b/l lower extremities  JPS and Vibratory sense intact b/l thumbs and great toes  Gait, Coordination, and Reflexes     Gait  Gait: normal (Independent )    Reflexes   Right brachioradialis: 2+  Left brachioradialis: 2+  Right biceps: 2+  Left biceps: 1+  Right triceps: 2+  Left triceps: 2+  Right patellar: 2+  Left patellar: 2+  Right achilles reflex grade: +1-2  Left achilles reflex grade: +1-2    Right plantar: normal  Left plantar: normal  Right ankle clonus: absent  Left ankle clonus: absent      Imaging study:    10/11/18 [facility]  L-spine xray -- per report: " LUMBAR SPINE     INDICATION:   S32 [id number]: Wedge compression fracture of second lumbar vertebra, subsequent encounter for fracture with delayed healing      COMPARISON:  September 26, 2018     VIEWS:  XR SPINE LUMBAR 2 OR 3 VIEWS INJURY        FINDINGS:     There is compression deformity of the L2 vertebra with about 80% loss of vertebral height    The compression deformity stable  No new compression seen     Mild scoliosis with convexity to the right side seen     No significant lumbar degenerative change noted      The pedicles appear intact      Soft tissues are unremarkable      IMPRESSION:     Right compression deformity of the L2 vertebra with 80% loss of vertebral height, stable        Workstation performed: [id number] "

## 2018-10-15 NOTE — PROGRESS NOTES
Assessment/Plan:     Diagnoses and all orders for this visit:    Closed compression fracture of L2 lumbar vertebra with routine healing, subsequent encounter  -     X-ray lumbar spine 2 or 3 views; Future          Discussion / Summary: This is a 70 y o  female who presents for follow up of L2 compression fracture  Ms Suzi Jenkins has been maintained in LSO  back brace for approximately 6 weeks  She reports compliance with wearing LSO brace  Lumbar spine xray ( 10/11/18) demonstrates stable compression deformity L2 vertebra compared to prior lumbar xray (9/26/18)  Reviewed lumbar xray imaging with Ms Garcia along with prior imaging ( lumbar xray 9/3/18; lumbar CT 9/2/18)  She continues with intermittent low back pain but overall her low back pain is a little bit improved in interval since last visit  Discussed with patient continued management with LSO brace verses returning with Dr Abbie Johnson to discuss possible Kyphoplasty  Ms Suzi Jenkins is not interested in kyphoplasty at this juncture  Mr Suzi Jenkins wishes to continue with management with back brace  Discussed with patient that she may remove LSO back brace to shower but otherwise she is recommended to wear LSO back brace when head of bed greater then 45 degrees and out of bed  Reviewed with patient how to apply / wear the LSO brace so fits appropriately snug  Recommend she refrain from strenuous activity and avoid bending and twisting back  Recommend she limit lifting, pulling, pushing to no more then 5-10 lbs  No driving while being treated in back brace  Recommend she take fall precautions and refrain from activity that increases chance of fall or injury to back  She is recommended to follow up in 4 weeks with L-spine xray to be completed a few days prior to follow up visit      Patient advised to contact our office or present to Emergency Room if experience worsening or new back pain, sensory or motor change, bladder or bowel dysfunction, or other neurological change  Patient expressed understanding and agreement     _______________________________________________________________________________________  Subjective:      Patient ID: Gerard Pichardo is a 70 y o  female who presents for follow up of L2 compression fracture  Ms Cali Washington has been maintained in LSO  back brace for approximately 6 weeks  She underwent L-spine xray 10/11/18  She is accompanied at office visit with her significant other  HPI  In review -- Ms Cali Washington was hospitalized at [facility] 9/2/2018 status post fall onto her buttock while going down a ramp  She had immediate low back pain and left upper iliac crest pain  She presentd to the emergency department for evaluation  During workup she was found have an L2 compression fracture  She was seen in consultation by Dr Sirena Aguilar for the L2 fracture and was advised management with LSO brace  She has also been wearing a brace for left forearm pain and indicates she is to see an orthopedic provider in near future  She reports compliance with wearing LSO brace although thinks brace might be loose at times  She reports her LBP is overall a little bit better since last visit  She denies back pain currently  She reports she last experienced low back pain last night approximately 12 hours after taking a dose of oxycodone  She describes her LBP as a pressure like pain in the middle of her lower back  When LBP occurs it may go up to 8/10 on pain scale  She denies lower extremity pain  She denies numbness, tingling, or weakness of her lower extremities  She ambulates independent and reports she has been up and moving about more  She denies bladder or bowel dysfunction or saddle paresthesias  She reports taking Oxycodone 1 tab about q 8 hrs prn although indicates has gone to 12 hrs between doses  Also reports she take Tylenol ES 1 tab q 6 hrs an Methocarbamol 1 tab q 6 hrs         Pain medication being managed by PCP  The following portions of the patient's history were reviewed and updated as appropriate: allergies, current medications, past family history, past medical history, past social history and past surgical history  Review of Systems   Constitutional: Negative for fever  Respiratory: Negative for shortness of breath  Gastrointestinal:        Denies bowel dysfunction    Genitourinary: Negative for difficulty urinating  Musculoskeletal: Negative for gait problem  No back pain currently  Last experiences back pain last night   Neurological: Negative for weakness and numbness  Psychiatric/Behavioral: Negative for agitation  Objective:      /82 (BP Location: Left arm, Patient Position: Sitting, Cuff Size: Standard)   Pulse 60   Temp 98 °F (36 7 °C) (Tympanic)   Resp 16   Ht 5' 6" (1 676 m)   Wt 57 kg (125 lb 9 6 oz)   BMI 20 27 kg/m²          Physical Exam   Constitutional: She appears well-developed and well-nourished  No distress  Wearing LSO brace  Wearing brace on left forearm/hand  Eyes: Conjunctivae are normal    Pulmonary/Chest: Effort normal    Musculoskeletal:        Thoracic back: She exhibits no tenderness and no deformity  Lumbar back: She exhibits no tenderness and no deformity  Neurological: Gait normal    Reflex Scores:       Tricep reflexes are 2+ on the right side and 2+ on the left side  Bicep reflexes are 2+ on the right side and 1+ on the left side  Brachioradialis reflexes are 2+ on the right side and 2+ on the left side  Patellar reflexes are 2+ on the right side and 2+ on the left side  Achilles reflexes are Right achilles reflex grade: +1-2  and Left achilles reflex grade: +1-2     Skin: Skin is warm and dry  Psychiatric: She has a normal mood and affect   Her speech is normal        Neurologic Exam     Mental Status   Speech: speech is normal   Level of consciousness: alert    Motor Exam Motor:  Shoulder abduction 5/5 bilaterally  Elbow flexion/extension 5/5 bilaterally  Right Wrist flexion/extension 5/5  (Deferred left wrist flex/ext in setting of history of left wrist region injury)  Finger  / abduction 5/5 bilaterally  Hip flexion/abduction/adduction 5/5 bilaterally  Knee flexion/extension 5/5 bilaterally  Ankle DF/PF 5/5 bilaterally  Great toe DF 5/5 bilaterally  Sensory Exam     Sensation to pinprick intact b/l upper extremities, torso, and b/l lower extremities  JPS and Vibratory sense intact b/l thumbs and great toes  Gait, Coordination, and Reflexes     Gait  Gait: normal (Independent )    Reflexes   Right brachioradialis: 2+  Left brachioradialis: 2+  Right biceps: 2+  Left biceps: 1+  Right triceps: 2+  Left triceps: 2+  Right patellar: 2+  Left patellar: 2+  Right achilles reflex grade: +1-2  Left achilles reflex grade: +1-2  Right plantar: normal  Left plantar: normal  Right ankle clonus: absent  Left ankle clonus: absent      Imaging study:    10/11/18 [facility]  L-spine xray -- per report: " LUMBAR SPINE     INDICATION:   S32 [id number]: Wedge compression fracture of second lumbar vertebra, subsequent encounter for fracture with delayed healing      COMPARISON:  September 26, 2018     VIEWS:  XR SPINE LUMBAR 2 OR 3 VIEWS INJURY        FINDINGS:     There is compression deformity of the L2 vertebra with about 80% loss of vertebral height    The compression deformity stable  No new compression seen     Mild scoliosis with convexity to the right side seen     No significant lumbar degenerative change noted      The pedicles appear intact      Soft tissues are unremarkable      IMPRESSION:     Right compression deformity of the L2 vertebra with 80% loss of vertebral height, stable        Workstation performed: [id number] "

## 2018-10-15 NOTE — PATIENT INSTRUCTIONS
May remove LSO back brace to shower  Otherwise wear LSO back brace when head of bed greater then 45 degrees and out of bed  No strenuous activity  No bending and twisting back  Limit lifting, pulling, pushing to no more then 5-10 lbs  No driving while being treated in back brace  Recommend you take fall precautions and refrain from activity that increases chance of fall or injury to back  Contact our office or present to Emergency Room if experience worsening or new back pain, sensory or motor change, bladder or bowel dysfunction, or other neurological change

## 2018-10-16 ENCOUNTER — HOSPITAL ENCOUNTER (OUTPATIENT)
Dept: BONE DENSITY | Facility: MEDICAL CENTER | Age: 63
Discharge: HOME | End: 2018-10-16
Payer: MEDICARE

## 2018-10-16 DIAGNOSIS — S32.020D CLOSED COMPRESSION FRACTURE OF L2 LUMBAR VERTEBRA WITH ROUTINE HEALING, SUBSEQUENT ENCOUNTER: ICD-10-CM

## 2018-10-16 DIAGNOSIS — M54.6 ACUTE THORACIC BACK PAIN, UNSPECIFIED BACK PAIN LATERALITY: Primary | ICD-10-CM

## 2018-10-16 DIAGNOSIS — M80.08XA AGE-RELATED OSTEOPOROSIS WITH CURRENT PATHOLOGICAL FRACTURE OF VERTEBRA, INITIAL ENCOUNTER (HCC): ICD-10-CM

## 2018-10-16 DIAGNOSIS — M54.6 ACUTE THORACIC BACK PAIN, UNSPECIFIED BACK PAIN LATERALITY: ICD-10-CM

## 2018-10-16 PROCEDURE — 77080 DXA BONE DENSITY AXIAL: CPT

## 2018-10-16 RX ORDER — METHOCARBAMOL 750 MG/1
750 TABLET, FILM COATED ORAL EVERY 6 HOURS PRN
Qty: 270 TABLET | Refills: 0 | Status: SHIPPED | OUTPATIENT
Start: 2018-10-16 | End: 2020-04-23 | Stop reason: ALTCHOICE

## 2018-10-16 RX ORDER — METHOCARBAMOL 750 MG/1
750 TABLET, FILM COATED ORAL EVERY 6 HOURS PRN
Qty: 270 TABLET | Refills: 0 | Status: CANCELLED | OUTPATIENT
Start: 2018-10-16 | End: 2019-01-14

## 2018-10-16 RX ORDER — OXYCODONE HYDROCHLORIDE 10 MG/1
TABLET ORAL
Qty: 30 TABLET | Refills: 0 | Status: SHIPPED | OUTPATIENT
Start: 2018-10-16 | End: 2018-10-30 | Stop reason: SDUPTHER

## 2018-10-16 NOTE — TELEPHONE ENCOUNTER
methocarbamol (ROBAXIN) 750 mg tablet   ALSO SENT TO THE WRONG PHARMACY  PLEASE RESEND TO [facility] WITH THE OXYCODONE Sig: Take 1-2 tabs every 4 hours as needed   LAST OV 09/06/2018  PDMP CHECKED 10/16/2018    LAST FILL 10/04/218  QTY 30 FOR 3 DAYS  DR OSBORNE

## 2018-10-18 ENCOUNTER — APPOINTMENT (OUTPATIENT)
Dept: RADIOLOGY | Facility: CLINIC | Age: 63
End: 2018-10-18
Payer: MEDICARE

## 2018-10-18 ENCOUNTER — OFFICE VISIT (OUTPATIENT)
Dept: OBGYN CLINIC | Facility: CLINIC | Age: 63
End: 2018-10-18
Payer: MEDICARE

## 2018-10-18 VITALS
HEART RATE: 70 BPM | SYSTOLIC BLOOD PRESSURE: 166 MMHG | WEIGHT: 130 LBS | DIASTOLIC BLOOD PRESSURE: 76 MMHG | HEIGHT: 66 IN | BODY MASS INDEX: 20.89 KG/M2

## 2018-10-18 DIAGNOSIS — M25.532 PAIN IN LEFT WRIST: Primary | ICD-10-CM

## 2018-10-18 DIAGNOSIS — M25.532 PAIN IN LEFT WRIST: ICD-10-CM

## 2018-10-18 PROCEDURE — 99203 OFFICE O/P NEW LOW 30 MIN: CPT | Performed by: SURGERY

## 2018-10-18 PROCEDURE — 73110 X-RAY EXAM OF WRIST: CPT

## 2018-10-18 NOTE — LETTER
October 18, 2018     Yury Subramanian MD  [address] [place] [address]    Patient: Harinder Day   YOB: 1955   Date of Visit: 10/18/2018       Dear Dr Gabriela Linares:    Thank you for referring Harinder Day to me for evaluation  Below are my notes for this consultation  If you have questions, please do not hesitate to call me  I look forward to following your patient along with you  Sincerely,        Earline Correa MD        CC: No Recipients    ASSESSMENT/PLAN:      70 y o  Female with nondisplaced extra-articular left radius fracture and ulnar sided wrist pain, possible ECU tendinitis, as well as ulnar-sided wrist pain  At this time it is difficult to ascertain whether her pain is coming for fracture or a injury to the ulnar structures will allow her to fully heal for another 2 weeks and re-evaluate        In addition because she does have the bone spike dorsally I would like to obtain sequential oblique views to try to determine the position of spike to ensure that is not underlying the extensor tendons  * Continue the use of the wrist brace, at all times except for hygiene  * Will start OT in 2 weeks time  * Ulnar sided wrist pain will be re-evaluated in 2 weeks, with possible CSI  * Follow up in 2 weeks with repeat left wrist x-ray's out of cast, 5 views sequential rotation     The patient verbalized understanding of exam findings and treatment plan  We engaged in the shared decision-making process and treatment options were discussed at length with the patient  Surgical and conservative management discussed today along with risks and benefits  Diagnoses and all orders for this visit:    Pain in left wrist  -     XR wrist 3+ vw left; Future        Follow Up:  Return in about 2 weeks (around 11/1/2018) for Recheck  To Do Next Visit:  Re-evaluation of current issue and X-rays of the  left  wrist    General Discussions:  Fracture - Nonoperative Care:  The physiology of a fractured bone was discussed with the patient today  With non-displaced or minimally displaced fractures, conservative treatment such as casting or splinting often results in a functional recovery  Typically, these fractures are immobilized in either a cast or splint depending on the pattern  Radiographs are typically taken at intervals throughout the fracture healing to ensure that reduction or alignment is not lost   If the fracture loses its alignment, surgical intervention may be required to stabilize it  Medical conditions such as diabetes, osteoporosis, vitamin D deficiency, and a history of or exposure to smoking may delay or prevent fracture healing  Options between cast/splint immobilization and surgical treatment were offered as patient choice and the risks and benefits of both were discussed  ____________________________________________________________________________________________________________________________________________      CHIEF COMPLAINT:  Chief Complaint   Patient presents with    Left Wrist - Pain       SUBJECTIVE:  Ulysses Cap is a 70y o  year old RHD female who presents for left wrist pain  The patient was referred to the office by her neurosurgeon  The patient states on 09/02/18, she fell on her left wrist  The patient presented to the ED where x-ray's were taken, and she was informed nothing was broke, and she had a wrist sprain  The patient has been wearing a wrist brace, which she notes improvement with her symptoms, when she does wear the brace  The patient is experiencing constant pain to the ulnar aspect of her left wrist  The patient notes her pain is worse with twisting motions, such as opening a jar  The patient describes her pain as sharp with twisting motions, and a dull achy pain at rest  The patient states at its best her pain is 0/10, and at its worst her pain is 8/10   The patient is taking oxycodone and a muscle relaxer for a compression fracture of her back, that occurred because of the dame fall  The patient denies associated numbness and tingling  Pain  Moderate/mild  Intermittant  Sharp/dull    · NSAIDsNo   · Injections No   · Bracing/Orthotics Yes    · Occupational Therapy No     I have personally reviewed all the relevant PMH, PSH, SH, FH, Medications and allergies  PAST MEDICAL HISTORY:  Past Medical History:   Diagnosis Date    Hypertension        PAST SURGICAL HISTORY:  History reviewed  No pertinent surgical history      FAMILY HISTORY:  Family History   Problem Relation Age of Onset    Coronary artery disease Mother         early   [facility] Coronary artery disease Father         early       SOCIAL HISTORY:  Social History   Substance Use Topics    Smoking status: Never Smoker    Smokeless tobacco: Never Used      Comment: former smoker per allscripts,pack a day for about 20 years, quit 10 years ago    Alcohol use 1 2 oz/week     2 Glasses of wine per week      Comment: daily       MEDICATIONS:    Current Outpatient Prescriptions:     acetaminophen (TYLENOL) 325 mg tablet, Take 2 tablets (650 mg total) by mouth every 6 (six) hours as needed for mild pain (Patient taking differently: Take 650 mg by mouth as needed for mild pain  ), Disp: 30 tablet, Rfl: 0    aspirin 81 MG tablet, Take 81 mg by mouth daily, Disp: , Rfl:     calcium carbonate (OS-FELICIA) 600 MG tablet, Take 1,000 mg by mouth daily, Disp: , Rfl:     lisinopril (ZESTRIL) 20 mg tablet, Take 1 tablet (20 mg total) by mouth daily, Disp: 90 tablet, Rfl: 0    methocarbamol (ROBAXIN) 750 mg tablet, Take 1 tablet (750 mg total) by mouth every 6 (six) hours as needed for muscle spasms for up to 90 days, Disp: 270 tablet, Rfl: 0    metoprolol tartrate (LOPRESSOR) 25 mg tablet, Take 1 tablet (25 mg total) by mouth every 12 (twelve) hours, Disp: 180 tablet, Rfl: 0    Multiple Vitamins-Minerals (MULTIVITAMIN WITH MINERALS) tablet, Take 1 tablet by mouth daily, Disp: , Rfl:   oxyCODONE (ROXICODONE) 10 MG TABS, Take 1-2 tabs every 4 hours as needed, Disp: 30 tablet, Rfl: 0    polyethylene glycol (MIRALAX) 17 g packet, Take 17 g by mouth daily (Patient taking differently: Take 17 g by mouth as needed  ), Disp: 30 each, Rfl: 0    ALLERGIES:  Allergies   Allergen Reactions    Penicillins        REVIEW OF SYSTEMS:  Review of Systems   Constitutional: Negative for chills, fever and unexpected weight change  HENT: Negative for hearing loss, nosebleeds and sore throat  Eyes: Negative for pain, redness and visual disturbance  Respiratory: Negative for cough, shortness of breath and wheezing  Cardiovascular: Negative for chest pain, palpitations and leg swelling  Gastrointestinal: Positive for nausea  Negative for abdominal pain and vomiting  Endocrine: Negative for polydipsia and polyuria  Genitourinary: Negative for difficulty urinating and hematuria  Musculoskeletal: Negative for arthralgias, joint swelling and myalgias  Skin: Negative for rash and wound  Neurological: Negative for dizziness, numbness and headaches  Psychiatric/Behavioral: Negative for decreased concentration, dysphoric mood and suicidal ideas  The patient is not nervous/anxious          VITALS:  Vitals:    10/18/18 0916   BP: 166/76   Pulse: 70       LABS:  HgA1c:   Lab Results   Component Value Date    HGBA1C 5 2 10/22/2016     BMP:   Lab Results   Component Value Date    GLUCOSE 130 10/21/2016    CALCIUM 9 4 09/02/2018     09/02/2018    K 4 4 09/02/2018    CO2 27 09/02/2018     09/02/2018    BUN 12 09/02/2018    CREATININE 0 76 09/02/2018       _____________________________________________________  PHYSICAL EXAMINATION:  General: well developed and well nourished, alert, oriented times 3 and appears comfortable  Psychiatric: Normal  HEENT: Normocephalic, Atraumatic Trachea Midline, No torticollis  Pulmonary: No audible wheezing or respiratory distress   Cardiovascular: No pitting edema, 2+ radial pulse   Skin: No masses, erthema, lacerations, fluctation, ulcerations  Neurovascular: Sensation Intact to the Median, Ulnar, Radial Nerve, Motor Intact to the Median, Ulnar, Radial Nerve and Pulses Intact  Musculoskeletal: Normal, except as noted in detailed exam and in HPI  MUSCULOSKELETAL EXAMINATION:  Wrist extension 60 wrist flexion 60 full supination however pronation to approximately 60 painful at terminal point  Left wrist:  Non TTP TFCC , mild tenderness prepped palpation at the fovea  Non TTP John's tubercle   TTP ECU tendon ,   Bogginess of the ulnar head which is tender to palpation  Full digital motion  Neurovascularly intact brisk cap refill  No pain with thumb extension only slight discomfort with extension of the fingers resisted  ___________________________________________________  STUDIES REVIEWED:  I personally reviewed AP lateral oblique radiographs of both the injury films and repeat x-rays today  On the injury films there does not appear to be fracture with the exception of the lateral viewed which there is some cortical irregularity dorsally  On follow-up x-rays today there is a healing nondisplaced fracture in overall good alignment  The radius as an neutral tilt     However on the lateral there is a large bony spike although it is difficult to ascertain precisely where it is in the ulnar to radial direction          PROCEDURES PERFORMED:  Procedures  No Procedures performed today    _____________________________________________________      Veronica Raymond    I,:   Isael Castañeda am acting as a scribe while in the presence of the attending physician :        I,:   Neva Obregon MD personally performed the services described in this documentation    as scribed in my presence :                    Attestation signed by Neva Obregon MD at 10/18/2018 10:09 AM:  I saw and examined the patient personally and agree with my physician extender's note and plan  I formulated  the plan and gave  explicit instructions to the patient

## 2018-10-18 NOTE — PROGRESS NOTES
ASSESSMENT/PLAN:      70 y o  Female with nondisplaced extra-articular left radius fracture and ulnar sided wrist pain, possible ECU tendinitis, as well as ulnar-sided wrist pain  At this time it is difficult to ascertain whether her pain is coming for fracture or a injury to the ulnar structures will allow her to fully heal for another 2 weeks and re-evaluate        In addition because she does have the bone spike dorsally I would like to obtain sequential oblique views to try to determine the position of spike to ensure that is not underlying the extensor tendons  * Continue the use of the wrist brace, at all times except for hygiene  * Will start OT in 2 weeks time  * Ulnar sided wrist pain will be re-evaluated in 2 weeks, with possible CSI  * Follow up in 2 weeks with repeat left wrist x-ray's out of cast, 5 views sequential rotation     The patient verbalized understanding of exam findings and treatment plan  We engaged in the shared decision-making process and treatment options were discussed at length with the patient  Surgical and conservative management discussed today along with risks and benefits  Diagnoses and all orders for this visit:    Pain in left wrist  -     XR wrist 3+ vw left; Future        Follow Up:  Return in about 2 weeks (around 11/1/2018) for Recheck  To Do Next Visit:  Re-evaluation of current issue and X-rays of the  left  wrist    General Discussions:  Fracture - Nonoperative Care: The physiology of a fractured bone was discussed with the patient today  With non-displaced or minimally displaced fractures, conservative treatment such as casting or splinting often results in a functional recovery  Typically, these fractures are immobilized in either a cast or splint depending on the pattern    Radiographs are typically taken at intervals throughout the fracture healing to ensure that reduction or alignment is not lost   If the fracture loses its alignment, surgical intervention may be required to stabilize it  Medical conditions such as diabetes, osteoporosis, vitamin D deficiency, and a history of or exposure to smoking may delay or prevent fracture healing  Options between cast/splint immobilization and surgical treatment were offered as patient choice and the risks and benefits of both were discussed  ____________________________________________________________________________________________________________________________________________      CHIEF COMPLAINT:  Chief Complaint   Patient presents with    Left Wrist - Pain       SUBJECTIVE:  Denise Vazquez is a 70y o  year old RHD female who presents for left wrist pain  The patient was referred to the office by her neurosurgeon  The patient states on 09/02/18, she fell on her left wrist  The patient presented to the ED where x-ray's were taken, and she was informed nothing was broke, and she had a wrist sprain  The patient has been wearing a wrist brace, which she notes improvement with her symptoms, when she does wear the brace  The patient is experiencing constant pain to the ulnar aspect of her left wrist  The patient notes her pain is worse with twisting motions, such as opening a jar  The patient describes her pain as sharp with twisting motions, and a dull achy pain at rest  The patient states at its best her pain is 0/10, and at its worst her pain is 8/10  The patient is taking oxycodone and a muscle relaxer for a compression fracture of her back, that occurred because of the dame fall  The patient denies associated numbness and tingling  Pain  Moderate/mild  Intermittant  Sharp/dull    · NSAIDsNo   · Injections No   · Bracing/Orthotics Yes    · Occupational Therapy No     I have personally reviewed all the relevant PMH, PSH, SH, FH, Medications and allergies  PAST MEDICAL HISTORY:  Past Medical History:   Diagnosis Date    Hypertension        PAST SURGICAL HISTORY:  History reviewed   No pertinent surgical history  FAMILY HISTORY:  Family History   Problem Relation Age of Onset    Coronary artery disease Mother         early   Evelyn Courser Coronary artery disease Father         early       SOCIAL HISTORY:  Social History   Substance Use Topics    Smoking status: Never Smoker    Smokeless tobacco: Never Used      Comment: former smoker per allscripts,pack a day for about 20 years, quit 10 years ago    Alcohol use 1 2 oz/week     2 Glasses of wine per week      Comment: daily       MEDICATIONS:    Current Outpatient Prescriptions:     acetaminophen (TYLENOL) 325 mg tablet, Take 2 tablets (650 mg total) by mouth every 6 (six) hours as needed for mild pain (Patient taking differently: Take 650 mg by mouth as needed for mild pain  ), Disp: 30 tablet, Rfl: 0    aspirin 81 MG tablet, Take 81 mg by mouth daily, Disp: , Rfl:     calcium carbonate (OS-FELICIA) 600 MG tablet, Take 1,000 mg by mouth daily, Disp: , Rfl:     lisinopril (ZESTRIL) 20 mg tablet, Take 1 tablet (20 mg total) by mouth daily, Disp: 90 tablet, Rfl: 0    methocarbamol (ROBAXIN) 750 mg tablet, Take 1 tablet (750 mg total) by mouth every 6 (six) hours as needed for muscle spasms for up to 90 days, Disp: 270 tablet, Rfl: 0    metoprolol tartrate (LOPRESSOR) 25 mg tablet, Take 1 tablet (25 mg total) by mouth every 12 (twelve) hours, Disp: 180 tablet, Rfl: 0    Multiple Vitamins-Minerals (MULTIVITAMIN WITH MINERALS) tablet, Take 1 tablet by mouth daily, Disp: , Rfl:     oxyCODONE (ROXICODONE) 10 MG TABS, Take 1-2 tabs every 4 hours as needed, Disp: 30 tablet, Rfl: 0    polyethylene glycol (MIRALAX) 17 g packet, Take 17 g by mouth daily (Patient taking differently: Take 17 g by mouth as needed  ), Disp: 30 each, Rfl: 0    ALLERGIES:  Allergies   Allergen Reactions    Penicillins        REVIEW OF SYSTEMS:  Review of Systems   Constitutional: Negative for chills, fever and unexpected weight change  HENT: Negative for hearing loss, nosebleeds and sore throat  Eyes: Negative for pain, redness and visual disturbance  Respiratory: Negative for cough, shortness of breath and wheezing  Cardiovascular: Negative for chest pain, palpitations and leg swelling  Gastrointestinal: Positive for nausea  Negative for abdominal pain and vomiting  Endocrine: Negative for polydipsia and polyuria  Genitourinary: Negative for difficulty urinating and hematuria  Musculoskeletal: Negative for arthralgias, joint swelling and myalgias  Skin: Negative for rash and wound  Neurological: Negative for dizziness, numbness and headaches  Psychiatric/Behavioral: Negative for decreased concentration, dysphoric mood and suicidal ideas  The patient is not nervous/anxious  VITALS:  Vitals:    10/18/18 0916   BP: 166/76   Pulse: 70       LABS:  HgA1c:   Lab Results   Component Value Date    HGBA1C 5 2 10/22/2016     BMP:   Lab Results   Component Value Date    GLUCOSE 130 10/21/2016    CALCIUM 9 4 09/02/2018     09/02/2018    K 4 4 09/02/2018    CO2 27 09/02/2018     09/02/2018    BUN 12 09/02/2018    CREATININE 0 76 09/02/2018       _____________________________________________________  PHYSICAL EXAMINATION:  General: well developed and well nourished, alert, oriented times 3 and appears comfortable  Psychiatric: Normal  HEENT: Normocephalic, Atraumatic Trachea Midline, No torticollis  Pulmonary: No audible wheezing or respiratory distress   Cardiovascular: No pitting edema, 2+ radial pulse   Skin: No masses, erthema, lacerations, fluctation, ulcerations  Neurovascular: Sensation Intact to the Median, Ulnar, Radial Nerve, Motor Intact to the Median, Ulnar, Radial Nerve and Pulses Intact  Musculoskeletal: Normal, except as noted in detailed exam and in HPI        MUSCULOSKELETAL EXAMINATION:  Wrist extension 60 wrist flexion 60 full supination however pronation to approximately 60 painful at terminal point  Left wrist:  Non TTP TFCC , mild tenderness prepped palpation at the fovea  Non TTP John's tubercle   TTP ECU tendon ,   Bogginess of the ulnar head which is tender to palpation  Full digital motion  Neurovascularly intact brisk cap refill  No pain with thumb extension only slight discomfort with extension of the fingers resisted  ___________________________________________________  STUDIES REVIEWED:  I personally reviewed AP lateral oblique radiographs of both the injury films and repeat x-rays today  On the injury films there does not appear to be fracture with the exception of the lateral viewed which there is some cortical irregularity dorsally  On follow-up x-rays today there is a healing nondisplaced fracture in overall good alignment  The radius as an neutral tilt     However on the lateral there is a large bony spike although it is difficult to ascertain precisely where it is in the ulnar to radial direction          PROCEDURES PERFORMED:  Procedures  No Procedures performed today    _____________________________________________________      Keyona Age    I,:   My Castañeda am acting as a scribe while in the presence of the attending physician :        I,:   Gagandeep Luu MD personally performed the services described in this documentation    as scribed in my presence :

## 2018-10-30 DIAGNOSIS — S32.020D CLOSED COMPRESSION FRACTURE OF L2 LUMBAR VERTEBRA WITH ROUTINE HEALING, SUBSEQUENT ENCOUNTER: Primary | ICD-10-CM

## 2018-10-30 RX ORDER — OXYCODONE HYDROCHLORIDE 10 MG/1
TABLET ORAL
Qty: 30 TABLET | Refills: 0 | Status: SHIPPED | OUTPATIENT
Start: 2018-10-30 | End: 2018-11-15 | Stop reason: SDUPTHER

## 2018-11-01 ENCOUNTER — OFFICE VISIT (OUTPATIENT)
Dept: OBGYN CLINIC | Facility: CLINIC | Age: 63
End: 2018-11-01
Payer: MEDICARE

## 2018-11-01 ENCOUNTER — APPOINTMENT (OUTPATIENT)
Dept: RADIOLOGY | Facility: CLINIC | Age: 63
End: 2018-11-01
Payer: MEDICARE

## 2018-11-01 VITALS
SYSTOLIC BLOOD PRESSURE: 146 MMHG | HEIGHT: 66 IN | HEART RATE: 60 BPM | BODY MASS INDEX: 20.89 KG/M2 | DIASTOLIC BLOOD PRESSURE: 72 MMHG | WEIGHT: 130 LBS

## 2018-11-01 DIAGNOSIS — M25.532 PAIN IN LEFT WRIST: ICD-10-CM

## 2018-11-01 DIAGNOSIS — S52.552D CLOSED EXTRAARTICULAR FRACTURE OF DISTAL END OF LEFT RADIUS WITH ROUTINE HEALING: Primary | ICD-10-CM

## 2018-11-01 PROCEDURE — 73110 X-RAY EXAM OF WRIST: CPT

## 2018-11-01 PROCEDURE — 99213 OFFICE O/P EST LOW 20 MIN: CPT | Performed by: SURGERY

## 2018-11-01 NOTE — PROGRESS NOTES
ASSESSMENT/PLAN:       70 y o  Female with left nondisplaced extra-articular radius fracture and ulnar sided wrist pain  , at this point she can discontinue the brace and start working on motion  I will see her back in approximately 6 weeks to re-evaluate for clinical improvement  Possible corticosteroid injection into the ulnar side of the wrist if discomfort persists at the fovea    * D/C the use of the wrist brace  * Activities as tolerated   * Start OT for ROM  * Follow up in 6 weeks     The patient verbalized understanding of exam findings and treatment plan  We engaged in the shared decision-making process and treatment options were discussed at length with the patient  Surgical and conservative management discussed today along with risks and benefits  Diagnoses and all orders for this visit:    Closed extraarticular fracture of distal end of left radius with routine healing  -     Ambulatory referral to PT/OT hand therapy; Future    Pain in left wrist  -     XR wrist 3+ vw left; Future  -     Ambulatory referral to PT/OT hand therapy; Future        Follow Up:  Return in about 6 weeks (around 12/13/2018) for Recheck  To Do Next Visit:  Re-evaluation of current issue    ____________________________________________________________________________________________________________________________________________      CHIEF COMPLAINT:  Chief Complaint   Patient presents with    Left Wrist - Follow-up       SUBJECTIVE:  Melida Ortiz is a 70y o  year old RHD female who presents for follow up for left nondisplaced extra-articular radius fracture and ulnar sided wrist pain  The patient has bee wearing a wrist brace at all times, except for hygiene  The patient states that she is still experiencing ulnar sided wrist pain, with the use of the brace  She denies associated numbness and tinging  I have personally reviewed all the relevant PMH, PSH, SH, FH, Medications and allergies       PAST MEDICAL HISTORY:  Past Medical History:   Diagnosis Date    Hypertension        PAST SURGICAL HISTORY:  History reviewed  No pertinent surgical history      FAMILY HISTORY:  Family History   Problem Relation Age of Onset    Coronary artery disease Mother         early   Sommer Patel Coronary artery disease Father         early       SOCIAL HISTORY:  Social History   Substance Use Topics    Smoking status: Never Smoker    Smokeless tobacco: Never Used      Comment: former smoker per allscripts,pack a day for about 20 years, quit 10 years ago    Alcohol use 1 2 oz/week     2 Glasses of wine per week      Comment: daily       MEDICATIONS:    Current Outpatient Prescriptions:     acetaminophen (TYLENOL) 325 mg tablet, Take 2 tablets (650 mg total) by mouth every 6 (six) hours as needed for mild pain (Patient taking differently: Take 650 mg by mouth as needed for mild pain  ), Disp: 30 tablet, Rfl: 0    aspirin 81 MG tablet, Take 81 mg by mouth daily, Disp: , Rfl:     calcium carbonate (OS-FELICIA) 600 MG tablet, Take 1,000 mg by mouth daily, Disp: , Rfl:     lisinopril (ZESTRIL) 20 mg tablet, Take 1 tablet (20 mg total) by mouth daily, Disp: 90 tablet, Rfl: 0    methocarbamol (ROBAXIN) 750 mg tablet, Take 1 tablet (750 mg total) by mouth every 6 (six) hours as needed for muscle spasms for up to 90 days, Disp: 270 tablet, Rfl: 0    metoprolol tartrate (LOPRESSOR) 25 mg tablet, Take 1 tablet (25 mg total) by mouth every 12 (twelve) hours, Disp: 180 tablet, Rfl: 0    Multiple Vitamins-Minerals (MULTIVITAMIN WITH MINERALS) tablet, Take 1 tablet by mouth daily, Disp: , Rfl:     oxyCODONE (ROXICODONE) 10 MG TABS, Take 1-2 tabs every 4 hours as needed, Disp: 30 tablet, Rfl: 0    polyethylene glycol (MIRALAX) 17 g packet, Take 17 g by mouth daily (Patient taking differently: Take 17 g by mouth as needed  ), Disp: 30 each, Rfl: 0    ALLERGIES:  Allergies   Allergen Reactions    Penicillins        REVIEW OF SYSTEMS:  Review of Systems Constitutional: Negative for chills, fever and unexpected weight change  HENT: Negative for hearing loss, nosebleeds and sore throat  Eyes: Negative for pain, redness and visual disturbance  Respiratory: Negative for cough, shortness of breath and wheezing  Cardiovascular: Negative for chest pain, palpitations and leg swelling  Gastrointestinal: Negative for abdominal pain, nausea and vomiting  Endocrine: Negative for polydipsia and polyuria  Genitourinary: Negative for difficulty urinating and hematuria  Musculoskeletal: Negative for arthralgias, joint swelling and myalgias  Skin: Negative for rash and wound  Neurological: Negative for dizziness, numbness and headaches  Psychiatric/Behavioral: Negative for decreased concentration, dysphoric mood and suicidal ideas  The patient is not nervous/anxious  VITALS:  Vitals:    11/01/18 0830   BP: 146/72   Pulse: 60       LABS:  HgA1c:   Lab Results   Component Value Date    HGBA1C 5 2 10/22/2016     BMP:   Lab Results   Component Value Date    GLUCOSE 130 10/21/2016    CALCIUM 9 4 09/02/2018     09/02/2018    K 4 4 09/02/2018    CO2 27 09/02/2018     09/02/2018    BUN 12 09/02/2018    CREATININE 0 76 09/02/2018       _____________________________________________________  PHYSICAL EXAMINATION:  General: well developed and well nourished, alert, oriented times 3 and appears comfortable  Psychiatric: Normal  HEENT: Normocephalic, Atraumatic Trachea Midline, No torticollis  Pulmonary: No audible wheezing or respiratory distress   Cardiovascular: No pitting edema, 2+ radial pulse   Skin: No masses, erthema, lacerations, fluctation, ulcerations  Neurovascular: Sensation Intact to the Median, Ulnar, Radial Nerve, Motor Intact to the Median, Ulnar, Radial Nerve and Pulses Intact  Musculoskeletal: Normal, except as noted in detailed exam and in HPI        MUSCULOSKELETAL EXAMINATION:  Left wrist  1st Dorsal Compartment non TTP, DRUJ non TTP, TFCC non TTP, ECU non TTP, FCU non TTP, FCR non TTP, CMC Joint non TTP and lyster's tubercle non TTP but is prominet, Fracture site non TTP, scaphoid tubercle non TTP, pisiform non TTP,  fovea TTP  Range of motion of the left wrist  is 65 flexion, 65 degrees extension, full pronation, 65 supination, painful at terminal    There is no swelling present  There is no ecchymosis noted  Pulses are present and capillary fill is normal      Darlean Reins Test is negative for pain and negative for clunk left wrist     Radial Carpal Impaction negative  Ulnar Carpal Impaction negative      ___________________________________________________  STUDIES REVIEWED:  I have personally reviewed AP lateral and oblique radiographs of the right wrist which demonstrate a healed nondisplaced distal radius fracture            PROCEDURES PERFORMED:  Procedures  No Procedures performed today    _____________________________________________________      Ronak Gunter    I,:   Flavio Swanson am acting as a scribe while in the presence of the attending physician :        I,:   Angelica Jacome MD personally performed the services described in this documentation    as scribed in my presence :

## 2018-11-12 ENCOUNTER — HOSPITAL ENCOUNTER (OUTPATIENT)
Dept: RADIOLOGY | Facility: HOSPITAL | Age: 63
Discharge: HOME | End: 2018-11-12
Payer: MEDICARE

## 2018-11-12 ENCOUNTER — TRANSCRIBE ORDERS (OUTPATIENT)
Dept: RADIOLOGY | Facility: HOSPITAL | Age: 63
End: 2018-11-12

## 2018-11-12 DIAGNOSIS — S32.020D CLOSED COMPRESSION FRACTURE OF L2 LUMBAR VERTEBRA WITH ROUTINE HEALING, SUBSEQUENT ENCOUNTER: ICD-10-CM

## 2018-11-12 PROCEDURE — 72100 X-RAY EXAM L-S SPINE 2/3 VWS: CPT

## 2018-11-14 ENCOUNTER — OFFICE VISIT (OUTPATIENT)
Dept: NEUROSURGERY | Facility: CLINIC | Age: 63
End: 2018-11-14
Payer: MEDICARE

## 2018-11-14 VITALS
WEIGHT: 134 LBS | TEMPERATURE: 98.6 F | BODY MASS INDEX: 21.53 KG/M2 | HEIGHT: 66 IN | SYSTOLIC BLOOD PRESSURE: 160 MMHG | HEART RATE: 68 BPM | DIASTOLIC BLOOD PRESSURE: 80 MMHG

## 2018-11-14 DIAGNOSIS — S32.020D CLOSED COMPRESSION FRACTURE OF L2 LUMBAR VERTEBRA WITH ROUTINE HEALING, SUBSEQUENT ENCOUNTER: Primary | ICD-10-CM

## 2018-11-14 PROCEDURE — 99213 OFFICE O/P EST LOW 20 MIN: CPT | Performed by: PHYSICIAN ASSISTANT

## 2018-11-14 NOTE — PROGRESS NOTES
Assessment/Plan:     Diagnoses and all orders for this visit:    Closed compression fracture of L2 lumbar vertebra with routine healing, subsequent encounter  -     XR spine lumbar minimum 4 views non injury; Future          Discussion / Summary: This is a 70 y o  female who presents for follow up of L2 compression fracture  Patient has been maintained in LSO back brace for approximately 10 weeks  She continues with lower back pain but reports some improvement in her low back pain in interval since last visit  Lumbar spine xray ( 11/12/18 ) was reviewed in detail by Dr Andrés Martinez and demonstrates stable compression deformity of L2 compared to prior imaging 10/11/18  Offered follow up with Dr Evelyne Pritchett to discuss kyphoplasty but she declined as she is not interested in kyphoplasty at this juncture  She is recommended to go for lumbar  flex/ext xray to evaluate dynamic stability  If no instability the plan is to wean her out of the back brace and pursue a course of PT  Patient understands she is to take the back brace off for the xray and to reapply the back brace after xray  She may remove back brace to shower but is otherwise to continue wearing her LSO back brace when head of bed greater then 45 degrees and out of bed until notified otherwise by our service  For the time being she is to continue to refrain from strenuous activity  Recommend she refrain from bending / twisting back and refrain from lifting, pulling, or pushing more then  10 lbs  No driving in setting of having to wear back brace  Recommend patient take fall precautions and refrain from activity that increases chance of falling or injury to back  She is not interested in Pain Management referral at this juncture  Further neurosurgical follow up is on a prn basis at this juncture  Patient advised to call our office within a week of having her xray if she does not hear back from our office sooner        Patient reports being on Calcium supplement  She had recent Dexa scan completed which reports osteoporosis  She has not followed up with her PCP for padmini/management of osteoporosis since completion of dexa scan  Patient advised to follow up with PCP for eval/managmenet of osteoporosis  Patient is to contact our office or present to hospital Emergency Room if experience worsening or new back pain, leg pain, sensory or motor change, bladder or bowel dysfunction, or other neurological change  Patient expressed understanding and agreement       _________________________________________________________________________________________    Subjective:      Patient ID: Lonny Akhtar is a 70 y o  female who presents for follow up of L2 compression fracture  Patient has been maintained in LSO back brace for approximately 10 weeks  She is s/p L-spine xray 11/12/18  She is accompanied with her significant other  HPI Ms Garcia reports compliance with wearing back brace  She does describe some lower back pain today currently rated 5/10 on pain scale  She describes the back pain is being located in the middle of her lower describes as a pressure/dull pain  She reports her back pain is a bit better in interval since last visit  She reports she last took Oxycodone 1 tab, Methocarbamol 1 tab , and Tylenol 1-2 tabs around 8pm last night  She denies numbness, tingling, weakness, or pain in her lower extremities  She ambulates independent  She denies bladder or bowel dysfunction or saddle paresthesias  She reports she takes Calcium supplement  She had a DEXA scan completed that demonstrated presence of osteoporosis but she has not followed up with her PCP yet to discuss medical management of the osteoporosis      The following portions of the patient's history were reviewed and updated as appropriate: allergies, current medications, past family history, past medical history, past social history and past surgical history  Review of Systems   Constitutional: Negative for fever  Respiratory: Negative for shortness of breath  Gastrointestinal:        Denies bowel dysfunction   Genitourinary: Negative for difficulty urinating  Musculoskeletal: Positive for back pain  Neurological: Negative for weakness and numbness  Psychiatric/Behavioral: Negative for agitation  Objective:      /80 (BP Location: Left arm, Patient Position: Sitting, Cuff Size: Standard)   Pulse 68   Temp 98 6 °F (37 °C) (Temporal)   Ht 5' 6" (1 676 m)   Wt 60 8 kg (134 lb)   BMI 21 63 kg/m²          Physical Exam   Constitutional: She appears well-developed and well-nourished  No distress  Eyes: Conjunctivae are normal    Pulmonary/Chest: Effort normal    Musculoskeletal:        Lumbar back: She exhibits tenderness (mid to lower lumbar in midline)  She exhibits no deformity  Neurological: She is alert  Gait normal    Reflex Scores:       Tricep reflexes are 2+ on the right side and 2+ on the left side  Bicep reflexes are 2+ on the right side and 2+ on the left side  Brachioradialis reflexes are 2+ on the right side and 2+ on the left side  Patellar reflexes are 2+ on the right side and 2+ on the left side  Achilles reflexes are 2+ on the right side and 2+ on the left side  Skin: Skin is warm and dry  Psychiatric: She has a normal mood and affect  Her speech is normal        Neurologic Exam     Mental Status   Speech: speech is normal   Level of consciousness: alert    Motor Exam     Motor:  Shoulder abduction 5/5 bilaterally  Elbow flexion/extension 5/5 bilaterally  Wrist flexion/extension 5/5 bilaterally  Finger  / abduction 5/5 bilaterally  Hip flexion/abduction/adduction 5/5 bilaterally  Knee flexion/extension 5/5 bilaterally  Ankle DF/PF 5/5 bilaterally  Sensory Exam     Sensation to pinprick intact b/l upper extremities, torso, and b/l lower extremities       JPS and Vibratory sense intact b/l thumbs and great toes  Gait, Coordination, and Reflexes     Gait  Gait: normal (Independent )    Reflexes   Right brachioradialis: 2+  Left brachioradialis: 2+  Right biceps: 2+  Left biceps: 2+  Right triceps: 2+  Left triceps: 2+  Right patellar: 2+  Left patellar: 2+  Right achilles: 2+  Left achilles: 2+  Right plantar: normal  Left plantar: normal  Right ankle clonus: absent  Left ankle clonus: absent      Imaging study:  11/12/18 [facility] L-spine xray -- per report: " LUMBAR SPINE     INDICATION:   [id number]: Wedge compression fracture of second lumbar vertebra, subsequent encounter for fracture with routine healing      COMPARISON:  Radiographs of the lumbar spine from October 11, 2018      VIEWS:  XR SPINE LUMBAR 2 OR 3 VIEWS INJURY  Images: 2     FINDINGS:     Redemonstrated is the chronic compression fracture deformity of the L2 vertebral body  There is no interval change in alignment or the degree of compression  Diffuse osteopenia is noted throughout the visualized lumbosacral spine without additional   fracture deformities  The patient's brace obscures optimal assessment of the visualized lumbosacral spine and the paraspinal soft tissues      There is mild dextroscoliotic curvature to the lumbar spine    There is stable mild retropulsion of the superior L2 endplate secondary to the chronic compression deformity      No significant lumbar degenerative change noted      The pedicles appear intact      Soft tissues are unremarkable      IMPRESSION:     Stable L2 osteoporotic compression fracture deformity         Workstation performed: [id number] "

## 2018-11-14 NOTE — PATIENT INSTRUCTIONS
Please go for lumbar  flex/ext xray  You will have to take the back brace off for xray and reapply the back brace after the xray  May remove back brace to shower but otherwise continue wearing LSO back brace when head of bed greater then 45 degrees and out of bed until notified otherwise by our office  Continue to refrain from strenuous activity  Recommend you refrain from bending / twisting back and refrain from lifting, pulling, or pushing more then 10 lbs  No driving in setting of having to wear back brace  Recommend you take fall precautions and refrain from activity that increases chance of falling or injury to back  Please call our office within in a week for the review of lumbar xray result if you do not hear back from our office sooner  Follow up with your Primary Care Provider for evaluation and management of osteoporosis  Contact our office or present to hospital Emergency Room if experience worsening or new back pain, leg pain, sensory or motor change, bladder or bowel dysfunction, or other neurological change

## 2018-11-14 NOTE — LETTER
November 15, 2018     James Balderrama MD  [address]  [address]  [address]    Patient: Merritt Whyte   YOB: 1955   Date of Visit: 11/14/2018       Dear Dr Wing Ivan:    Thank you for referring Merritt Whyte to me for evaluation  Below are my notes for this consultation  If you have questions, please do not hesitate to call me  I look forward to following your patient along with you  Sincerely,        Fabiola Sacks, PA-C        CC: No Recipients  Fabiola Sacks, PA-C  11/15/2018  4:38 AM  Sign at close encounter  Assessment/Plan:     Diagnoses and all orders for this visit:    Closed compression fracture of L2 lumbar vertebra with routine healing, subsequent encounter  -     XR spine lumbar minimum 4 views non injury; Future          Discussion / Summary: This is a 70 y o  female who presents for follow up of L2 compression fracture  Patient has been maintained in LSO back brace for approximately 10 weeks  She continues with lower back pain but reports some improvement in her low back pain in interval since last visit  Lumbar spine xray ( 11/12/18 ) was reviewed in detail by Dr Jennifer Tyler and demonstrates stable compression deformity of L2 compared to prior imaging 10/11/18  Offered follow up with Dr Reggie Lu to discuss kyphoplasty but she declined as she is not interested in kyphoplasty at this juncture  She is recommended to go for lumbar  flex/ext xray to evaluate dynamic stability  If no instability the plan is to wean her out of the back brace and pursue a course of PT  Patient understands she is to take the back brace off for the xray and to reapply the back brace after xray  She may remove back brace to shower but is otherwise to continue wearing her LSO back brace when head of bed greater then 45 degrees and out of bed until notified otherwise by our service  For the time being she is to continue to refrain from strenuous activity   Recommend she refrain from bending / twisting back and refrain from lifting, pulling, or pushing more then  10 lbs  No driving in setting of having to wear back brace  Recommend patient take fall precautions and refrain from activity that increases chance of falling or injury to back  She is not interested in Pain Management referral at this juncture  Further neurosurgical follow up is on a prn basis at this juncture  Patient advised to call our office within a week of having her xray if she does not hear back from our office sooner  Patient reports being on Calcium supplement  She had recent Dexa scan completed which reports osteoporosis  She has not followed up with her PCP for padmini/management of osteoporosis since completion of dexa scan  Patient advised to follow up with PCP for eval/managmenet of osteoporosis  Patient is to contact our office or present to hospital Emergency Room if experience worsening or new back pain, leg pain, sensory or motor change, bladder or bowel dysfunction, or other neurological change  Patient expressed understanding and agreement       _________________________________________________________________________________________    Subjective:      Patient ID: George Agee is a 70 y o  female who presents for follow up of L2 compression fracture  Patient has been maintained in LSO back brace for approximately 10 weeks  She is s/p L-spine xray 11/12/18  She is accompanied with her significant other  HPI Ms Garcia reports compliance with wearing back brace  She does describe some lower back pain today currently rated 5/10 on pain scale  She describes the back pain is being located in the middle of her lower describes as a pressure/dull pain  She reports her back pain is a bit better in interval since last visit  She reports she last took Oxycodone 1 tab, Methocarbamol 1 tab , and Tylenol 1-2 tabs around 8pm last night      She denies numbness, tingling, weakness, or pain in her lower extremities  She ambulates independent  She denies bladder or bowel dysfunction or saddle paresthesias  She reports she takes Calcium supplement  She had a DEXA scan completed that demonstrated presence of osteoporosis but she has not followed up with her PCP yet to discuss medical management of the osteoporosis  The following portions of the patient's history were reviewed and updated as appropriate: allergies, current medications, past family history, past medical history, past social history and past surgical history  Review of Systems   Constitutional: Negative for fever  Respiratory: Negative for shortness of breath  Gastrointestinal:        Denies bowel dysfunction   Genitourinary: Negative for difficulty urinating  Musculoskeletal: Positive for back pain  Neurological: Negative for weakness and numbness  Psychiatric/Behavioral: Negative for agitation  Objective:      /80 (BP Location: Left arm, Patient Position: Sitting, Cuff Size: Standard)   Pulse 68   Temp 98 6 °F (37 °C) (Temporal)   Ht 5' 6" (1 676 m)   Wt 60 8 kg (134 lb)   BMI 21 63 kg/m²           Physical Exam   Constitutional: She appears well-developed and well-nourished  No distress  Eyes: Conjunctivae are normal    Pulmonary/Chest: Effort normal    Musculoskeletal:        Lumbar back: She exhibits tenderness (mid to lower lumbar in midline)  She exhibits no deformity  Neurological: She is alert  Gait normal    Reflex Scores:       Tricep reflexes are 2+ on the right side and 2+ on the left side  Bicep reflexes are 2+ on the right side and 2+ on the left side  Brachioradialis reflexes are 2+ on the right side and 2+ on the left side  Patellar reflexes are 2+ on the right side and 2+ on the left side  Achilles reflexes are 2+ on the right side and 2+ on the left side  Skin: Skin is warm and dry  Psychiatric: She has a normal mood and affect   Her speech is normal        Neurologic Exam     Mental Status   Speech: speech is normal   Level of consciousness: alert    Motor Exam     Motor:  Shoulder abduction 5/5 bilaterally  Elbow flexion/extension 5/5 bilaterally  Wrist flexion/extension 5/5 bilaterally  Finger  / abduction 5/5 bilaterally  Hip flexion/abduction/adduction 5/5 bilaterally  Knee flexion/extension 5/5 bilaterally  Ankle DF/PF 5/5 bilaterally  Sensory Exam     Sensation to pinprick intact b/l upper extremities, torso, and b/l lower extremities  JPS and Vibratory sense intact b/l thumbs and great toes  Gait, Coordination, and Reflexes     Gait  Gait: normal (Independent )    Reflexes   Right brachioradialis: 2+  Left brachioradialis: 2+  Right biceps: 2+  Left biceps: 2+  Right triceps: 2+  Left triceps: 2+  Right patellar: 2+  Left patellar: 2+  Right achilles: 2+  Left achilles: 2+  Right plantar: normal  Left plantar: normal  Right ankle clonus: absent  Left ankle clonus: absent      Imaging study:  11/12/18 [facility] L-spine xray -- per report: " LUMBAR SPINE     INDICATION:   S32 020D: Wedge compression fracture of second lumbar vertebra, subsequent encounter for fracture with routine healing      COMPARISON:  Radiographs of the lumbar spine from October 11, 2018      VIEWS:  XR SPINE LUMBAR 2 OR 3 VIEWS INJURY  Images: 2     FINDINGS:     Redemonstrated is the chronic compression fracture deformity of the L2 vertebral body  There is no interval change in alignment or the degree of compression  Diffuse osteopenia is noted throughout the visualized lumbosacral spine without additional   fracture deformities  The patient's brace obscures optimal assessment of the visualized lumbosacral spine and the paraspinal soft tissues      There is mild dextroscoliotic curvature to the lumbar spine    There is stable mild retropulsion of the superior L2 endplate secondary to the chronic compression deformity      No significant lumbar degenerative change noted      The pedicles appear intact      Soft tissues are unremarkable      IMPRESSION:     Stable L2 osteoporotic compression fracture deformity         Workstation performed: [id number] "

## 2018-11-15 DIAGNOSIS — S32.020D CLOSED COMPRESSION FRACTURE OF L2 LUMBAR VERTEBRA WITH ROUTINE HEALING, SUBSEQUENT ENCOUNTER: ICD-10-CM

## 2018-11-16 RX ORDER — OXYCODONE HYDROCHLORIDE 10 MG/1
TABLET ORAL
Qty: 30 TABLET | Refills: 0 | Status: SHIPPED | OUTPATIENT
Start: 2018-11-16 | End: 2018-11-30 | Stop reason: SDUPTHER

## 2018-11-16 NOTE — TELEPHONE ENCOUNTER
PATEINT STATED SHE SENT THIS THOUGH MY CHART        SHE IS LEAVING FOR VACATION ON Sunday AND WANTS TO BE SURE SHE HAS THEM FILLED PRIOR      oxyCODONE (ROXICODONE) 10 MG TABS [Yoli Sharma MD]     Preferred pharmacy: [id number]  LAST OV 09/06/2018    PDMP CHECKED 11/16/2018  LAST FILL 11/01/2018  QTY 30    DAY SUPPLY 3

## 2018-11-17 ENCOUNTER — HOSPITAL ENCOUNTER (OUTPATIENT)
Dept: RADIOLOGY | Facility: HOSPITAL | Age: 63
Discharge: HOME | End: 2018-11-17
Payer: MEDICARE

## 2018-11-17 DIAGNOSIS — S32.020D CLOSED COMPRESSION FRACTURE OF L2 LUMBAR VERTEBRA WITH ROUTINE HEALING, SUBSEQUENT ENCOUNTER: ICD-10-CM

## 2018-11-17 PROCEDURE — 72110 X-RAY EXAM L-2 SPINE 4/>VWS: CPT

## 2018-11-21 ENCOUNTER — TELEPHONE (OUTPATIENT)
Dept: NEUROSURGERY | Facility: CLINIC | Age: 63
End: 2018-11-21

## 2018-11-21 DIAGNOSIS — S32.020D CLOSED COMPRESSION FRACTURE OF L2 LUMBAR VERTEBRA WITH ROUTINE HEALING, SUBSEQUENT ENCOUNTER: Primary | ICD-10-CM

## 2018-11-21 NOTE — TELEPHONE ENCOUNTER
L-spine flex/ext xray (11/17/18) was reviewed with Dr Pam Blair -- no instability  Dr Pam Blair agrees that she may now wean out of back brace  Once out of back brace she may pursue course of Physical therapy  Further neurosurgical follow up may be on an as needed basis  Called/ spoke with Ms Garcia and informed her of this information / recommendations  Discussed with patient how to gradually wean out of back brace over the next 2 week by initially taking the back brace off for short period of time (ie  30 min or so) a few times per day and gradually increase the amount of time she has the back brace off until fully weaned out of the back brace in 2 weeks  Explained to patient that once she is out of back brace she may begin course of physical therapy and complete range of motion as she tolerate with PT and gradually increase her activity with physical therapy as tolerated  Order for PT placed in Epic chart  Ms Rashard Ivan reports she will arrange PT at a [facility] near her home  She reports she has list of [facility] centers  Explained to Ms Garcia that further neurosurgical follow up with our office may be on as needed basis  Ms Rashard Ivan was advised to contact our office or present to hospital Emergency Room if experience worsening or new back pain, sensory or motor change, bladder or bowel dysfunction, or other neurological change  Ms Rashard Ivan expressed understanding and agreement

## 2018-11-21 NOTE — TELEPHONE ENCOUNTER
----- Message from Isabelle Aguilar, [address] sent at 11/21/2018 12:22 PM EST -----  Cortney Hdez requesting a call back for results of her Xray Lumbar done on 11/17

## 2018-11-30 DIAGNOSIS — S32.020D CLOSED COMPRESSION FRACTURE OF L2 LUMBAR VERTEBRA WITH ROUTINE HEALING, SUBSEQUENT ENCOUNTER: ICD-10-CM

## 2018-12-03 RX ORDER — OXYCODONE HYDROCHLORIDE 10 MG/1
TABLET ORAL
Qty: 60 TABLET | Refills: 0 | Status: SHIPPED | OUTPATIENT
Start: 2018-12-03 | End: 2019-01-03 | Stop reason: SDUPTHER

## 2018-12-03 NOTE — TELEPHONE ENCOUNTER
The patient has not started physical therapy yet, so she is taking the pills q 12hrs daily for pain  She has been getting a quantity of #30  Can she get at least 60, so that she can take 2 daily if needed for pain? This refill request was sent through [address] originally  Thank you!

## 2018-12-04 ENCOUNTER — HOSPITAL ENCOUNTER (OUTPATIENT)
Dept: RADIOLOGY | Facility: HOSPITAL | Age: 63
Discharge: HOME | End: 2018-12-04
Payer: MEDICARE

## 2018-12-04 VITALS — HEIGHT: 66 IN | BODY MASS INDEX: 20.09 KG/M2 | WEIGHT: 125 LBS

## 2018-12-04 DIAGNOSIS — Z12.31 VISIT FOR SCREENING MAMMOGRAM: ICD-10-CM

## 2018-12-04 PROCEDURE — 77067 SCR MAMMO BI INCL CAD: CPT

## 2018-12-05 ENCOUNTER — EVALUATION (OUTPATIENT)
Dept: PHYSICAL THERAPY | Facility: REHABILITATION | Age: 63
End: 2018-12-05
Payer: MEDICARE

## 2018-12-05 DIAGNOSIS — M54.50 CHRONIC BILATERAL LOW BACK PAIN WITHOUT SCIATICA: Primary | ICD-10-CM

## 2018-12-05 DIAGNOSIS — G89.29 CHRONIC BILATERAL LOW BACK PAIN WITHOUT SCIATICA: Primary | ICD-10-CM

## 2018-12-05 DIAGNOSIS — S32.020D CLOSED COMPRESSION FRACTURE OF L2 LUMBAR VERTEBRA WITH ROUTINE HEALING, SUBSEQUENT ENCOUNTER: ICD-10-CM

## 2018-12-05 PROCEDURE — G8978 MOBILITY CURRENT STATUS: HCPCS | Performed by: PHYSICAL THERAPIST

## 2018-12-05 PROCEDURE — G8979 MOBILITY GOAL STATUS: HCPCS | Performed by: PHYSICAL THERAPIST

## 2018-12-05 PROCEDURE — 97162 PT EVAL MOD COMPLEX 30 MIN: CPT | Performed by: PHYSICAL THERAPIST

## 2018-12-05 NOTE — PROGRESS NOTES
PT Evaluation     Today's date: 2018  Patient name: Shant Mcclellan  : 1955  MRN: [id number]  Referring provider: WILMAN Morales*  Dx:   Encounter Diagnosis     ICD-10-CM    1  Chronic bilateral low back pain without sciatica M54 5     G89 29    2  Closed compression fracture of L2 lumbar vertebra with routine healing, subsequent encounter S32 020D Ambulatory referral to Physical Therapy                  Assessment  Assessment details: Patient is a 70 y o  female referred to PT with a dx of L2 compression fracture and L radius fracture  Patient reports onset of pain complaints occurred 2018 after falling outside landing of he buttock/back  X-rays revealed an L2 compression fracture and she was placed in a TLSO  She had continued pain in the L wrist and later xrays revealed a non-displaced radius fracture and that splinted  She presents t oPT today with complaints of continued LBP and mild to moderate L wrist pain  Clinical examination is consistent with lumbar flexion and extension dysfunction as well as L wrist dysfunction  No other referral appears necessary at this time  Impairments: abnormal or restricted ROM, activity intolerance, impaired physical strength, lacks appropriate home exercise program and pain with function  Functional limitations: IADls  Symptom irritability: moderateUnderstanding of Dx/Px/POC: good   Prognosis: good    Goals  Short Term goals - 4 weeks  1  Patient will be independent and compliant with a HEP  2   Patient will report a 50% decrease in pain complaints  Long Term goals - 8 weeks  1  Patient will report elimination of pain complaints  2   Patient will return to all work related activities without restriction  3   Patient will return to all recreational activities without restriction      Plan  Patient would benefit from: skilled physical therapy  Planned modality interventions: cryotherapy  Planned therapy interventions: manual therapy, joint mobilization, abdominal trunk stabilization, patient education, postural training, neuromuscular re-education, therapeutic exercise, home exercise program and functional ROM exercises  Frequency: 2x week  Duration in visits: 12  Duration in weeks: 6  Plan of Care beginning date: 2018  Plan of Care expiration date: 2019  Treatment plan discussed with: patient        Subjective Evaluation    History of Present Illness  Date of onset: 2018  Mechanism of injury: Fall onto L buttock - L2 compression fracture and L radius fracture  Not a recurrent problem   Quality of life: good    Pain  Current pain ratin  At best pain rating: 3  At worst pain ratin  Location: Low back  Quality: discomfort and tight  Relieving factors: rest  Progression: no change    Social Support    Employment status: working (realtor)    Diagnostic Tests  X-ray: abnormal  Treatments  Previous treatment: immobilization and medication (TLSO brace)  Current treatment: medication and physical therapy  Patient Goals  Patient goals for therapy: decreased pain, increased motion, increased strength, independence with ADLs/IADLs, return to sport/leisure activities and return to work          Objective     Special Questions  Negative for night pain, disturbed sleep, bladder dysfunction, bowel dysfunction and saddle (S4) numbness    Palpation   Left   Tenderness of the erector spinae  Right Tenderness of the erector spinae  Active Range of Motion     Left Wrist   Wrist flexion: 64 degrees with pain  Wrist extension: 42 degrees with pain  Radial deviation: 22 degrees   Ulnar deviation: 40 degrees     Right Wrist   Normal active range of motion    Lumbar   Flexion: Active lumbar flexion: 50% limited  with pain  Extension: Active lumbar extension: 50% limited   with pain    Passive Range of Motion     Additional Passive Range of Motion Details  L3-L5 segmental hypomobility    Strength/Myotome Testing     Lumbar   Left   Normal strength    Right   Normal strength    Tests       Thoracic   Negative slump  Lumbar   Negative repeated flexion, repeated extension and slump  Left   Negative passive SLR  Right   Negative passive SLR         Flowsheet Rows      Most Recent Value   PT/OT G-Codes   Current Score  49   Projected Score  63   FOTO information reviewed  Yes   Assessment Type  Evaluation   G code set  Mobility: Walking & Moving Around   Mobility: Walking and Moving Around Current Status ()  CK   Mobility: Walking and Moving Around Goal Status ()  CJ          Precautions: HTN; Osteoporosis    Daily Treatment Diary     Manual              L/spine pa mobs L3-5 Gr IV             L wrist PROM                                                        Exercise Diary              Treadmill             LTR             S/DKTC             Seated lumbar flexion             bridge                                                    Recumbent bike                                                                                                                                                                Modalities

## 2018-12-06 ENCOUNTER — OFFICE VISIT (OUTPATIENT)
Dept: FAMILY MEDICINE CLINIC | Facility: CLINIC | Age: 63
End: 2018-12-06
Payer: MEDICARE

## 2018-12-06 ENCOUNTER — TELEPHONE (OUTPATIENT)
Dept: FAMILY MEDICINE CLINIC | Facility: CLINIC | Age: 63
End: 2018-12-06

## 2018-12-06 VITALS
BODY MASS INDEX: 20.09 KG/M2 | WEIGHT: 125 LBS | HEART RATE: 80 BPM | DIASTOLIC BLOOD PRESSURE: 70 MMHG | TEMPERATURE: 96.9 F | HEIGHT: 66 IN | SYSTOLIC BLOOD PRESSURE: 118 MMHG | RESPIRATION RATE: 15 BRPM

## 2018-12-06 DIAGNOSIS — M80.00XD AGE-RELATED OSTEOPOROSIS WITH CURRENT PATHOLOGICAL FRACTURE WITH ROUTINE HEALING: Primary | ICD-10-CM

## 2018-12-06 DIAGNOSIS — Z23 NEED FOR INFLUENZA VACCINATION: ICD-10-CM

## 2018-12-06 DIAGNOSIS — F41.9 ANXIETY: ICD-10-CM

## 2018-12-06 DIAGNOSIS — L65.9 HYPOTRICHOSIS: ICD-10-CM

## 2018-12-06 DIAGNOSIS — S32.020D CLOSED COMPRESSION FRACTURE OF L2 LUMBAR VERTEBRA WITH ROUTINE HEALING, SUBSEQUENT ENCOUNTER: ICD-10-CM

## 2018-12-06 DIAGNOSIS — I73.00 RAYNAUD'S SYNDROME WITHOUT GANGRENE: ICD-10-CM

## 2018-12-06 DIAGNOSIS — I10 BENIGN ESSENTIAL HYPERTENSION: ICD-10-CM

## 2018-12-06 PROCEDURE — G0008 ADMIN INFLUENZA VIRUS VAC: HCPCS

## 2018-12-06 PROCEDURE — 90662 IIV NO PRSV INCREASED AG IM: CPT

## 2018-12-06 PROCEDURE — 99214 OFFICE O/P EST MOD 30 MIN: CPT | Performed by: FAMILY MEDICINE

## 2018-12-06 RX ORDER — ALENDRONATE SODIUM 70 MG/1
70 TABLET ORAL
Qty: 4 TABLET | Refills: 0 | Status: SHIPPED | OUTPATIENT
Start: 2018-12-06 | End: 2019-03-19 | Stop reason: ALTCHOICE

## 2018-12-06 RX ORDER — LISINOPRIL 20 MG/1
20 TABLET ORAL DAILY
Qty: 90 TABLET | Refills: 0
Start: 2018-12-06 | End: 2019-04-18 | Stop reason: SDUPTHER

## 2018-12-06 RX ORDER — BIMATOPROST 3 UG/ML
SOLUTION TOPICAL
Qty: 5 ML | Refills: 0 | Status: SHIPPED | OUTPATIENT
Start: 2018-12-06 | End: 2020-04-23 | Stop reason: ALTCHOICE

## 2018-12-06 NOTE — TELEPHONE ENCOUNTER
PATIENT WAS INTO SEE YOU TODAY  WENT HOME AND DID HER RESEARCH AND HAS DECIDED TO TRY THE [address]   IF MARISELA COULD START THE INSURANCE VERIFICATION  SINCE IT SO NEAR THE END OF THE YEAR ,SHE IS HOPING TO HAVE THIS ASAP SINCE HER DEDUCTIBLES WILL RESET IN January  Shaina Sanchez   PATIENT HAS MEDICARE AND [place] [employer],    PLEASE ADVISE

## 2018-12-06 NOTE — PROGRESS NOTES
Assessment/Plan:         Diagnoses and all orders for this visit:    Age-related osteoporosis with current pathological fracture with routine healing  -     alendronate (FOSAMAX) 70 mg tablet; Take 1 tablet (70 mg total) by mouth every 7 days    Need for influenza vaccination  -     PREFERRED: influenza vaccine, 2947-6780, high-dose, PF 0 5 mL, for patients 65 yr+ (FLUZONE HIGH-DOSE)    Benign essential hypertension  -     lisinopril (ZESTRIL) 20 mg tablet; Take 1 tablet (20 mg total) by mouth daily    Closed compression fracture of L2 lumbar vertebra with routine healing, subsequent encounter    Anxiety    Raynaud's syndrome without gangrene    Hypotrichosis  -     bimatoprost (LATISSE) 0 03 % ophthalmic solution; Place one drop on applicator and apply evenly along the skin of the upper eyelid at base of eyelashes once daily at bedtime; repeat procedure for second eye (use a clean applicator)  She will think about Prolia  Continue current medications      Subjective:      Patient ID: Edilma Hernadez is a 70 y o  female  Here to follow up  S/p fall and closed compression fracture L2 3 months ago  Pain is improving 3/10 back pain today   Going to PT soon   Diagnosed with osteoporosis this year   Taking calcium and Vitamin D   Anxiety   Presents for follow-up visit  Patient reports no chest pain, compulsions, confusion, decreased concentration, depressed mood, excessive worry, feeling of choking, hyperventilation, irritability, malaise, muscle tension, nausea, nervous/anxious behavior, obsessions, palpitations or panic  Symptoms occur rarely  The quality of sleep is good  Compliance with medications is %  Hypertension   This is a chronic problem  The current episode started more than 1 year ago  The problem has been gradually improving since onset  Associated symptoms include anxiety   Pertinent negatives include no blurred vision, chest pain, malaise/fatigue, neck pain, palpitations, peripheral edema or PND  There are no known risk factors for coronary artery disease  Past treatments include ACE inhibitors  The current treatment provides moderate improvement  There are no compliance problems  There is no history of angina, CVA or PVD  There is no history of chronic renal disease, coarctation of the aorta, hyperaldosteronism, hypercortisolism, hyperparathyroidism, a hypertension causing med, pheochromocytoma, renovascular disease, sleep apnea or a thyroid problem  The following portions of the patient's history were reviewed and updated as appropriate: allergies, current medications, past family history, past medical history, past social history, past surgical history and problem list     Review of Systems   Constitutional: Negative for irritability and malaise/fatigue  Eyes: Negative for blurred vision  Cardiovascular: Negative for chest pain, palpitations and PND  Gastrointestinal: Negative for nausea  Musculoskeletal: Negative for neck pain  Psychiatric/Behavioral: Negative for confusion and decreased concentration  The patient is not nervous/anxious  Objective:      /70 (BP Location: Left arm, Patient Position: Sitting, Cuff Size: Standard)   Pulse 80   Temp (!) 96 9 °F (36 1 °C)   Resp 15   Ht 5' 6" (1 676 m)   Wt 56 7 kg (125 lb)   BMI 20 18 kg/m²          Physical Exam   Constitutional: She is oriented to person, place, and time  She appears well-developed and well-nourished  HENT:   Head: Normocephalic and atraumatic  Eyes: Pupils are equal, round, and reactive to light  EOM are normal    Neck: No tracheal deviation present  No thyromegaly present  Cardiovascular: Normal rate and regular rhythm  Pulmonary/Chest: Effort normal and breath sounds normal  No respiratory distress  She has no wheezes  Musculoskeletal: She exhibits no edema, tenderness or deformity  Neurological: She is alert and oriented to person, place, and time     Psychiatric: She has a normal mood and affect   Her behavior is normal

## 2018-12-07 ENCOUNTER — OFFICE VISIT (OUTPATIENT)
Dept: PHYSICAL THERAPY | Facility: REHABILITATION | Age: 63
End: 2018-12-07
Payer: MEDICARE

## 2018-12-07 DIAGNOSIS — S32.020D CLOSED COMPRESSION FRACTURE OF L2 LUMBAR VERTEBRA WITH ROUTINE HEALING, SUBSEQUENT ENCOUNTER: Primary | ICD-10-CM

## 2018-12-07 PROCEDURE — 97110 THERAPEUTIC EXERCISES: CPT

## 2018-12-07 PROCEDURE — 97140 MANUAL THERAPY 1/> REGIONS: CPT

## 2018-12-07 PROCEDURE — 97112 NEUROMUSCULAR REEDUCATION: CPT

## 2018-12-07 NOTE — PROGRESS NOTES
Daily Note     Today's date: 2018  Patient name: Niko Meng  : 1955  MRN: [id number]  Referring provider: WILMAN Peterson*  Dx:   Encounter Diagnosis     ICD-10-CM    1  Closed compression fracture of L2 lumbar vertebra with routine healing, subsequent encounter S32 020D                   Subjective:Patient reports that she has not worn TLSO since LV, no c/o sx without brace  Objective: See treatment diary below  Precautions: HTN; Osteoporosis     Daily Treatment Diary      Manual                        L/spine pa mobs L3-5 Gr IV  MARU                     L wrist PROM                                                                                                     Exercise Diary                        Treadmill                       LTR  10"x10                     S/DKTC  10"x10                     Seated lumbar flexion                       bridge 5" 2x10                      TA contraction 5" 2x10                                                                     Recumbent bike  10'                                                                                                                                                                                                                                                                                                   Modalities                                                                                                            Assessment: Tolerated treatment well  Verbal and tactile cues to correct TE form  Plan: Continue per plan of care

## 2018-12-10 ENCOUNTER — TELEPHONE (OUTPATIENT)
Dept: FAMILY MEDICINE CLINIC | Facility: CLINIC | Age: 63
End: 2018-12-10

## 2018-12-10 NOTE — TELEPHONE ENCOUNTER
Patient was at [employer] yesterday, and started to feel warm  When she was kneeling she felt weak so she leaned back and went back into a seated position  She was getting cold sweats, nausea and felt like passing out  Patient called 911 and when ambulance got there she started to feel better  She was concerned that she was having a "silent heart attack" but did not have chest symptoms  Patient got her blood sugar checked and they were fine  She is inquiring if she should follow up with you? Karrie Nissen

## 2018-12-10 NOTE — TELEPHONE ENCOUNTER
Patient is inquiring if you think if she was dehydrated? She just would like to know the reasoning behind the order  Also, she did have some nausea today but she took a zantac and it helped  Other than that, just a little bit fatigued  If patient would like to schedule for an appointment, would I be able to double book? Patient is also inquiring if you needed her to do a blood test for diabetes possibly? Please advise

## 2018-12-10 NOTE — TELEPHONE ENCOUNTER
She was also inquiring if there were any heart tests you might want her to take, like an echo stress test?  Please advise  She is just concerned about the event that occurred yesterday

## 2018-12-10 NOTE — TELEPHONE ENCOUNTER
She needs to drink more water 60-80 oz a day and yes I would like to see her this week if she still having symptoms

## 2018-12-11 NOTE — TELEPHONE ENCOUNTER
She had a sugar test back in September which was within a normal range  Not drinking enough fluids or low sugars ( not high) can make you dizzy

## 2018-12-12 ENCOUNTER — OFFICE VISIT (OUTPATIENT)
Dept: PHYSICAL THERAPY | Facility: REHABILITATION | Age: 63
End: 2018-12-12
Payer: MEDICARE

## 2018-12-12 DIAGNOSIS — G89.29 CHRONIC BILATERAL LOW BACK PAIN WITHOUT SCIATICA: ICD-10-CM

## 2018-12-12 DIAGNOSIS — M54.50 CHRONIC BILATERAL LOW BACK PAIN WITHOUT SCIATICA: ICD-10-CM

## 2018-12-12 DIAGNOSIS — S32.020D CLOSED COMPRESSION FRACTURE OF L2 LUMBAR VERTEBRA WITH ROUTINE HEALING, SUBSEQUENT ENCOUNTER: Primary | ICD-10-CM

## 2018-12-12 PROCEDURE — 97110 THERAPEUTIC EXERCISES: CPT

## 2018-12-12 PROCEDURE — 97112 NEUROMUSCULAR REEDUCATION: CPT

## 2018-12-12 NOTE — PROGRESS NOTES
Daily Note     Today's date: 2018  Patient name: Hugh Gilbert  : 1955  MRN: [id number]  Referring provider: WILMAN Romo*  Dx:   Encounter Diagnosis     ICD-10-CM    1  Closed compression fracture of L2 lumbar vertebra with routine healing, subsequent encounter S32 020D    2  Chronic bilateral low back pain without sciatica M54 5     G89 29                   Subjective:Patient states that overall she has been feeling good  Objective: See treatment diary below  Precautions: HTN; Osteoporosis     Daily Treatment Diary      Manual                        L/spine pa mobs L3-5 Gr IV  MARU                     L wrist PROM                                                                                                     Exercise Diary   12                   Treadmill                       LTR  10"x10                     S/DKTC  10"x10                     Seated lumbar flexion                       bridge 5" 2x10  5" 2x10                    TA contraction 5" 2x10                      TA w/ BKFO    2x10                    TA w/ marching    2x10                   Recumbent bike  10'  10'                    clamshells    5" 2x10 ea                     seated Pball march   2x10                                                                                                                                                                                                                                                 Modalities                                                                                                            Assessment: Good tolerance for new TE added today, given updated HEP  Plan: Continue per plan of care

## 2018-12-14 ENCOUNTER — OFFICE VISIT (OUTPATIENT)
Dept: PHYSICAL THERAPY | Facility: REHABILITATION | Age: 63
End: 2018-12-14
Payer: MEDICARE

## 2018-12-14 DIAGNOSIS — G89.29 CHRONIC BILATERAL LOW BACK PAIN WITHOUT SCIATICA: ICD-10-CM

## 2018-12-14 DIAGNOSIS — S32.020D CLOSED COMPRESSION FRACTURE OF L2 LUMBAR VERTEBRA WITH ROUTINE HEALING, SUBSEQUENT ENCOUNTER: Primary | ICD-10-CM

## 2018-12-14 DIAGNOSIS — M54.50 CHRONIC BILATERAL LOW BACK PAIN WITHOUT SCIATICA: ICD-10-CM

## 2018-12-14 PROCEDURE — 97110 THERAPEUTIC EXERCISES: CPT

## 2018-12-14 PROCEDURE — 97112 NEUROMUSCULAR REEDUCATION: CPT

## 2018-12-14 NOTE — PROGRESS NOTES
Daily Note     Today's date: 2018  Patient name: Edwin Yeboah  : 1955  MRN: [id number]  Referring provider: WILMAN Fuller*  Dx:   Encounter Diagnosis     ICD-10-CM    1  Closed compression fracture of L2 lumbar vertebra with routine healing, subsequent encounter S32 020D    2  Chronic bilateral low back pain without sciatica M54 5     G89 29                   Subjective:Patient reports being very sore after LV  Objective: See treatment diary below  Precautions: HTN; Osteoporosis     Daily Treatment Diary      Manual                        L/spine pa mobs L3-5 Gr IV  MARU                     L wrist PROM                                                                                                     Exercise Diary                    Treadmill                       LTR  10"x10   10"x10                 S/DKTC  10"x10   10"x10                 Seated lumbar flexion                       bridge 5" 2x10  5" 2x10                    TA contraction 5" 2x10                      TA w/ BKFO    2x10  2x10                  TA w/ marching    2x10  2x10                 Recumbent bike  10'  10'                    clamshells    5" 2x10 ea  5" 2x10 ea                  seated Pball march   2x10                    Nu-Step     10' lvl 5                  total gym       5' Lvl 5                                                                                                                                                                                               Modalities                                                                                                            Assessment: Cuing for glute activation throughout session  Plan: Continue per plan of care

## 2018-12-17 ENCOUNTER — OFFICE VISIT (OUTPATIENT)
Dept: OBGYN CLINIC | Facility: CLINIC | Age: 63
End: 2018-12-17
Payer: MEDICARE

## 2018-12-17 VITALS
DIASTOLIC BLOOD PRESSURE: 80 MMHG | HEIGHT: 66 IN | SYSTOLIC BLOOD PRESSURE: 141 MMHG | HEART RATE: 73 BPM | BODY MASS INDEX: 20.25 KG/M2 | WEIGHT: 126 LBS

## 2018-12-17 DIAGNOSIS — M25.532 PAIN IN LEFT WRIST: ICD-10-CM

## 2018-12-17 DIAGNOSIS — S52.552D CLOSED EXTRAARTICULAR FRACTURE OF DISTAL END OF LEFT RADIUS WITH ROUTINE HEALING: Primary | ICD-10-CM

## 2018-12-17 DIAGNOSIS — M77.8 LEFT WRIST TENDONITIS: ICD-10-CM

## 2018-12-17 PROCEDURE — 99213 OFFICE O/P EST LOW 20 MIN: CPT | Performed by: SURGERY

## 2018-12-17 NOTE — PROGRESS NOTES
ASSESSMENT/PLAN:      70 y o  female with a left nondisplaced extra-articular radius fracture and ulnar sided left wrist pain  ECU tendonitis  and possible TFCC pathology  A CSI was discussed in the office today  Loreto would like to see if OT will improve her symptoms  A new OT order was proved today  An MRI may be ordered in 6 months time if therapy has failed and a CSI has failed  We will see her back in the office in 6 weeks  The patient verbalized understanding of exam findings and treatment plan  We engaged in the shared decision-making process and treatment options were discussed at length with the patient  Surgical and conservative management discussed today along with risks and benefits  Diagnoses and all orders for this visit:    Closed extraarticular fracture of distal end of left radius with routine healing  -     Ambulatory referral to PT/OT hand therapy; Future    Pain in left wrist  -     Ambulatory referral to PT/OT hand therapy; Future    Left wrist tendonitis  -     Ambulatory referral to PT/OT hand therapy; Future        Follow Up:  Return in about 6 weeks (around 1/28/2019)  To Do Next Visit:  Re-evaluation of current issue    ____________________________________________________________________________________________________________________________________________      CHIEF COMPLAINT:  Chief Complaint   Patient presents with    Left Wrist - Follow-up       SUBJECTIVE:  Edwin Yeboah is a 70y o  year old RHD female who presents to the office today for follow up for a left nondisplaced extra-articular radius fracture and ulnar sided wrist pain that occurred on 09/02/18 due to a fall  Loreto was treated non operatively with the use of a wrist brace  She was instructed to d/c the use of the wrist brace at her last visit  She has been working with OT for wrist ROM  The patient states that she was attending a therapy facility by her house that does not have OT   The patient has switched to [facility] for [facility] which she starts next Thursday  Anjelica Abreu states that she is still experiencing left ulnar sided wrist pain  She denies associated numbness and tingling  I have personally reviewed all the relevant PMH, PSH, SH, FH, Medications and allergies  PAST MEDICAL HISTORY:  Past Medical History:   Diagnosis Date    Hypertension        PAST SURGICAL HISTORY:  Past Surgical History:   Procedure Laterality Date    BREAST BIOPSY Right     stereotactic BX Benign       FAMILY HISTORY:  Family History   Problem Relation Age of Onset    Coronary artery disease Mother         early   Lauraiva Cameron Coronary artery disease Father         early       SOCIAL HISTORY:  Social History   Substance Use Topics    Smoking status: Never Smoker    Smokeless tobacco: Never Used      Comment: former smoker per allscripts,pack a day for about 20 years, quit 10 years ago    Alcohol use 1 2 oz/week     2 Glasses of wine per week      Comment: daily       MEDICATIONS:    Current Outpatient Prescriptions:     acetaminophen (TYLENOL) 325 mg tablet, Take 2 tablets (650 mg total) by mouth every 6 (six) hours as needed for mild pain (Patient taking differently: Take 650 mg by mouth as needed for mild pain  ), Disp: 30 tablet, Rfl: 0    alendronate (FOSAMAX) 70 mg tablet, Take 1 tablet (70 mg total) by mouth every 7 days, Disp: 4 tablet, Rfl: 0    aspirin 81 MG tablet, Take 81 mg by mouth daily, Disp: , Rfl:     bimatoprost (LATISSE) 0 03 % ophthalmic solution, Place one drop on applicator and apply evenly along the skin of the upper eyelid at base of eyelashes once daily at bedtime; repeat procedure for second eye (use a clean applicator)  , Disp: 5 mL, Rfl: 0    calcium carbonate (OS-FELICIA) 600 MG tablet, Take 1,000 mg by mouth daily, Disp: , Rfl:     lisinopril (ZESTRIL) 20 mg tablet, Take 1 tablet (20 mg total) by mouth daily, Disp: 90 tablet, Rfl: 0    methocarbamol (ROBAXIN) 750 mg tablet, Take 1 tablet (750 mg total) by mouth every 6 (six) hours as needed for muscle spasms for up to 90 days, Disp: 270 tablet, Rfl: 0    metoprolol tartrate (LOPRESSOR) 25 mg tablet, Take 1 tablet (25 mg total) by mouth every 12 (twelve) hours, Disp: 180 tablet, Rfl: 0    Multiple Vitamins-Minerals (MULTIVITAMIN WITH MINERALS) tablet, Take 1 tablet by mouth daily, Disp: , Rfl:     oxyCODONE (ROXICODONE) 10 MG TABS, Take 1-2 tabs every 4 hours as needed, Disp: 60 tablet, Rfl: 0    polyethylene glycol (MIRALAX) 17 g packet, Take 17 g by mouth daily (Patient taking differently: Take 17 g by mouth as needed  ), Disp: 30 each, Rfl: 0    ALLERGIES:  Allergies   Allergen Reactions    Penicillins        REVIEW OF SYSTEMS:  Review of Systems   Constitutional: Negative for chills, fever and unexpected weight change  HENT: Negative for hearing loss, nosebleeds and sore throat  Eyes: Negative for pain, redness and visual disturbance  Respiratory: Negative for cough, shortness of breath and wheezing  Cardiovascular: Negative for chest pain, palpitations and leg swelling  Gastrointestinal: Negative for abdominal pain, nausea and vomiting  Endocrine: Negative for polydipsia and polyuria  Genitourinary: Negative for difficulty urinating and hematuria  Musculoskeletal: Negative for arthralgias, joint swelling and myalgias  Skin: Negative for rash and wound  Neurological: Negative for dizziness, numbness and headaches  Psychiatric/Behavioral: Negative for decreased concentration, dysphoric mood and suicidal ideas  The patient is not nervous/anxious          VITALS:  Vitals:    12/17/18 1116   BP: 141/80   Pulse: 73       LABS:  HgA1c:   Lab Results   Component Value Date    HGBA1C 5 2 10/22/2016     BMP:   Lab Results   Component Value Date    GLUCOSE 130 10/21/2016    CALCIUM 9 4 09/02/2018     11/21/2016    K 4 4 09/02/2018    CO2 27 09/02/2018     09/02/2018    BUN 12 09/02/2018    CREATININE 0 76 09/02/2018 _____________________________________________________  PHYSICAL EXAMINATION:  General: well developed and well nourished, alert, oriented times 3 and appears comfortable  Psychiatric: Normal  HEENT: Normocephalic, Atraumatic Trachea Midline, No torticollis  Pulmonary: No audible wheezing or respiratory distress   Cardiovascular: No pitting edema, 2+ radial pulse   Skin: No masses, erthema, lacerations, fluctation, ulcerations  Neurovascular: Sensation Intact to the Median, Ulnar, Radial Nerve, Motor Intact to the Median, Ulnar, Radial Nerve and Pulses Intact  Musculoskeletal: Normal, except as noted in detailed exam and in HPI  MUSCULOSKELETAL EXAMINATION:  Left wrist:    1st Dorsal Compartment non TTP, DRUJ non TTP, TFCC  TTP, ECU  TTP, FCU non TTP, FCR non TTP, CMC Joint non TTP and lyster's tubercle non TTP but is prominet, Fracture site non TTP, scaphoid tubercle non TTP, pisiform non TTP,  fovea TTP  Range of motion of the left wrist  is 65 flexion, 65 degrees extension, full pronation, 65 supination, painful at terminal    There is no swelling present  There is no ecchymosis noted      Pulses are present and capillary fill is normal       Rodriguez Test is negative for pain and negative for clunk left wrist      Radial Carpal Impaction negative  Ulnar Carpal Impaction negative    ___________________________________________________  STUDIES REVIEWED:    No new imaging        PROCEDURES PERFORMED:  Procedures  No Procedures performed today    _____________________________________________________      Salena Mason    I,:   Jesus Castañeda am acting as a scribe while in the presence of the attending physician :        I,:   Alexandr Parker MD personally performed the services described in this documentation    as scribed in my presence :

## 2018-12-19 ENCOUNTER — APPOINTMENT (OUTPATIENT)
Dept: PHYSICAL THERAPY | Facility: REHABILITATION | Age: 63
End: 2018-12-19
Payer: MEDICARE

## 2018-12-20 ENCOUNTER — EVALUATION (OUTPATIENT)
Dept: PHYSICAL THERAPY | Facility: CLINIC | Age: 63
End: 2018-12-20
Payer: MEDICARE

## 2018-12-20 ENCOUNTER — EVALUATION (OUTPATIENT)
Dept: OCCUPATIONAL THERAPY | Facility: CLINIC | Age: 63
End: 2018-12-20
Payer: MEDICARE

## 2018-12-20 DIAGNOSIS — M54.50 CHRONIC BILATERAL LOW BACK PAIN WITHOUT SCIATICA: ICD-10-CM

## 2018-12-20 DIAGNOSIS — S52.552D CLOSED EXTRAARTICULAR FRACTURE OF DISTAL END OF LEFT RADIUS WITH ROUTINE HEALING: ICD-10-CM

## 2018-12-20 DIAGNOSIS — G89.29 CHRONIC BILATERAL LOW BACK PAIN WITHOUT SCIATICA: ICD-10-CM

## 2018-12-20 DIAGNOSIS — M25.532 PAIN IN LEFT WRIST: ICD-10-CM

## 2018-12-20 DIAGNOSIS — S32.020D CLOSED COMPRESSION FRACTURE OF L2 LUMBAR VERTEBRA WITH ROUTINE HEALING, SUBSEQUENT ENCOUNTER: Primary | ICD-10-CM

## 2018-12-20 PROCEDURE — 97165 OT EVAL LOW COMPLEX 30 MIN: CPT | Performed by: OCCUPATIONAL THERAPIST

## 2018-12-20 PROCEDURE — 97110 THERAPEUTIC EXERCISES: CPT | Performed by: PHYSICAL THERAPIST

## 2018-12-20 PROCEDURE — G8981 BODY POS CURRENT STATUS: HCPCS | Performed by: PHYSICAL THERAPIST

## 2018-12-20 PROCEDURE — G8982 BODY POS GOAL STATUS: HCPCS | Performed by: PHYSICAL THERAPIST

## 2018-12-20 PROCEDURE — 97164 PT RE-EVAL EST PLAN CARE: CPT | Performed by: PHYSICAL THERAPIST

## 2018-12-20 PROCEDURE — 97140 MANUAL THERAPY 1/> REGIONS: CPT | Performed by: OCCUPATIONAL THERAPIST

## 2018-12-20 PROCEDURE — G8990 OTHER PT/OT CURRENT STATUS: HCPCS | Performed by: OCCUPATIONAL THERAPIST

## 2018-12-20 PROCEDURE — G8991 OTHER PT/OT GOAL STATUS: HCPCS | Performed by: OCCUPATIONAL THERAPIST

## 2018-12-20 NOTE — PROGRESS NOTES
Daily Note     Today's date: 2018  Patient name: Benjamin Macias  : 1955  MRN: [id number]  Referring provider: Kely Santacruz MD  Dx:   Encounter Diagnosis     ICD-10-CM    1  Pain in left wrist M25 532 Ambulatory referral to PT/OT hand therapy   2  Closed extraarticular fracture of distal end of left radius with routine healing S52 552D Ambulatory referral to PT/OT hand therapy                  Subjective: "Feels good"      Objective: See treatment diary below      Assessment: Tolerated treatment well  Patient would benefit from continued OT  Plan: Continue per plan of care         Specialty Daily Treatment Diary     Manual         IASTM ECU 10'       ECU stretching 3'       KT ECU X                           Exercise Diary         HEP: ECU stretch, ECU isometrics, CFM Issued                                                                                                                                                                    Modalities        MHP 10'

## 2018-12-20 NOTE — PROGRESS NOTES
PT Re-Evaluation     Today's date: 2018  Patient name: Harinder Day  : 1955  MRN: [id number]  Referring provider: WILMAN Kaye*  Dx:   Encounter Diagnosis     ICD-10-CM    1  Closed compression fracture of L2 lumbar vertebra with routine healing, subsequent encounter S32 020D    2  Chronic bilateral low back pain without sciatica M54 5     G89 29                   Assessment  Assessment details: Pt presents with signs and symptoms synonymous of admitting diagnosis as well as mechanical diagnosis of hypomobility  Pt presents with pain, decreased strength,  decreased range, joint mobility, flexibility, as well as tolerance to activity and postural awareness  Pt would benefit skilled PT intervention in order to address these impairments in order to be able to perform all desired activities with minimal to nil symptom exacerbation  She offers no concerns of Red flags and is likely experiencing residual symptoms remaining from being in TLSO for 8-12 weeks resulting in bodily structure changes  Thank you very much for this referral      Impairments: abnormal or restricted ROM, activity intolerance, impaired physical strength, pain with function and poor posture   Understanding of Dx/Px/POC: good   Prognosis: good    Goals  STG 4 Weeks:  Decrease pain at worst to 5/10  Improve range to SLR to 60, Ely 105  Improve strength to TA draw 20", hip to 4-  Independent with HEP  LTG 8 Weeks:  Decrease pain at worst to 2/10  Improve range to SLR 65, Ely 105  Improve strength to 30" TA Draw, hip 4/5 or greater     Able to perform all desired activities with minimal to nil symptom exacerbation      Plan  Patient would benefit from: skilled physical therapy  Planned modality interventions: cryotherapy and thermotherapy: hydrocollator packs  Planned therapy interventions: joint mobilization, manual therapy, abdominal trunk stabilization, patient education, postural training, stretching, strengthening, therapeutic exercise, therapeutic training, home exercise program, graded motor, graded exercise, graded activity, gait training, functional ROM exercises and flexibility  Frequency: 2x week  Duration in weeks: 8  Treatment plan discussed with: patient        Subjective Evaluation    History of Present Illness  Date of onset: 2018  Mechanism of injury: Pt presents today as a transfer from  presents today stating on 18 she fell on her L glute during a real estate activity where she was walking down on a wet mossy handicap ramp and immediately had pain  Pt reports that she thought she could breath it out, and it wasn't working  Pt reports that she was taken to the Hospital and diagnosed with L2 Fracture as well as received a fractured ulna on her L  Pt states that she was placed in a TLSO after meeting with Tavo White who indicated wearing this for 8-12 weeks and thus started treatment at  but needed further intervention for her L wrist   Pt reports there are periods of time without pain while laying on her back and R > L side as well as sitting, but standing at 4-6 hours is her limit before use of medication  Pt reports occasional R radicular to her R mid thigh that occurs with being stationary for a long period of time  Pt reports 8/10 at worst after being on her legs for a long time  Pt reports goals at this time are to be able to improve her pain levels, improve her mobility as well as get back into routine exercises and being able to perform her vocational duties as a   Denies changes in bowel or bladder or change in sensation in her LE      Pain  Current pain ratin  At best pain ratin  At worst pain ratin  Quality: dull ache, throbbing, pressure and sharp  Relieving factors: change in position  Aggravating factors: standing and walking  Progression: improved      Diagnostic Tests  X-ray: abnormal (Fx L2 and L wrist  )  Treatments  Previous treatment: physical therapy  Current treatment: medication, physical therapy and occupational therapy  Patient Goals  Patient goals for therapy: decreased pain, increased motion, increased strength, return to sport/leisure activities and return to work          Objective     Active Range of Motion     Lumbar   Flexion: 50 degrees with pain  Extension: 0 degrees with pain    Additional Active Range of Motion Details  Forward head, rounded shoulders, decreased Lordosis  B/L Sensation intact to L3,4,5,S1,S2  DTR Patellar B/L 1+   Repeated motion   Flex stiffness no radicular  Ext stiffness no radicular  SB R stiffness no radicular  SB L stiffness no radicular  Hip Strength  L Flex 4- Ext 4 Abd 3+ Add 4  R Flex 3+ Ext 4 Abd 3+  Add 4  LE Screen  Strong and pain less  Joint Mobility:  Grade 1 L1-2, Grade 3 L3-S1     Palpation: Pain along L1-2-3  STs  L SLR + 55, (-) Fadir, (-) Tamia, (-) Scour, (-) SI Distraction/Compression, + Distraction, + Ely 95  R  SLR + 55, (-) Fadir, (-) Tamia, (-) Scour, (-) SI Distraction/Compression, + Distraction, + Ely 95  TA Draw 15"        Flowsheet Rows      Most Recent Value   PT/OT G-Codes   Current Score  52   Projected Score  66   Assessment Type  Re-evaluation   G code set  Changing & Maintaining Body Position   Changing and Maintaining Body Position Current Status ()  CK   Changing and Maintaining Body Position Goal Status ()  CI          Precautions: HTN; Osteoporosis, Raynaud's No CP, Fall hx     Daily Treatment Diary      Manual   12/7 12/20               Bent over LS L1-5 mobs PA grade 2-3       nv               Prone Quad ST        nv                                                                                             Exercise Diary   12/7 12/12 12/14 12/20               Treadmill       Trial nv                                     S/DKTC  10"x10   10"x10  10" x 10               Seated lumbar flexion        nv               Ham ST B     20" x 4                TA contraction 5" 2x10      10" x10                                      TA w/ marching    2x10  2x10  2x 10               Recumbent bike  10'  10'    nv 6 min                clamshells    5" 2x10 ea  5" 2x10 ea   YTB              seated Pball march   2x10    nv               Sit to stand       nv                total gym         nv lvl16              HR/TR                        3 way hip B                                                                                                                                                     Modalities         12/20                HP prn to LS supine        10 min

## 2018-12-20 NOTE — PROGRESS NOTES
OT Evaluation     Today's date: 2018  Patient name: Alok Garrison  : 1955  MRN: [id number]  Referring provider: Abby Saeed MD  Dx:   Encounter Diagnosis     ICD-10-CM    1  Pain in left wrist M25 532 Ambulatory referral to PT/OT hand therapy   2  Closed extraarticular fracture of distal end of left radius with routine healing S52 552D Ambulatory referral to PT/OT hand therapy                  Assessment  Assessment details: Presents with L ECU irritation s/p L distal radius fracture  See below for a detailed assessment  Impairments: abnormal or restricted ROM, activity intolerance, impaired physical strength and pain with function    Symptom irritability: moderateUnderstanding of Dx/Px/POC: good   Prognosis: good    Goals  STG: Patient will be compliant with home exercise program in 2 weeks  STG: Pain will be reduced by 25% in 4 weeks  STG: Strength will be improved by 25% in 4 weeks  LTG: Strength will be improved by 50% in 6-8 weeks  LTG: Performance in ADLs and IADLS will be improved to prior level of function with the affected extremity within 6 weeks  LTG: Performance in work/school activity will be improved to prior level of function with the affected extremity within 6 weeks  LTG: FOTO score increase by 20 points within 6 weeks  Plan  Plan details: Treatment to include modalities, manual therapy, PRE's, HEP, and orthotics as appropriate     Patient would benefit from: skilled OT, OT eval and custom splinting  Planned modality interventions: thermotherapy: hydrocollator packs  Planned therapy interventions: functional ROM exercises, home exercise program, joint mobilization, manual therapy, therapeutic exercise, patient education, massage and Pedroza taping  Frequency: 2x week  Duration in weeks: 6  Treatment plan discussed with: patient        Subjective Evaluation    History of Present Illness  Date of surgery: 2018  Mechanism of injury: trauma  Mechanism of injury: Loreto is s/p a L distal radius fracture in September, now presenting with residual ulnar sided wrist pain  Pain  At best pain ratin  At worst pain ratin  Quality: sharp    Social Support    Employment status: working ([employer])  Hand dominance: right    Treatments  Current treatment: occupational therapy  Patient Goals  Patient goals for therapy: decreased pain, increased motion, increased strength and independence with ADLs/IADLs          Objective     Active Range of Motion     Left Elbow   Forearm supination: 70 degrees   Forearm pronation: 45 degrees     Left Wrist   Wrist flexion: 60 degrees   Wrist extension: 69 degrees   Radial deviation: 8 degrees   Ulnar deviation: 27 degrees     Right Wrist   Wrist flexion: 69 degrees   Wrist extension: 60 degrees   Radial deviation: 10 degrees   Ulnar deviation: 35 degrees     Strength/Myotome Testing     Left Wrist/Hand      (2nd hand position)     Trial 1: 40    Right Wrist/Hand      (2nd hand position)     Trial 1: 50    Additional Strength Details  +GRIT testing     Tests     Left Wrist/Hand   Negative TFCC load       Additional Tests Details  +ECU synergy test  -piano key      Swelling     Left Wrist/Hand   Circumference wrist: 16 cm    Right Wrist/Hand   Circumference wrist: 15 5 cm

## 2018-12-21 ENCOUNTER — APPOINTMENT (OUTPATIENT)
Dept: OCCUPATIONAL THERAPY | Facility: CLINIC | Age: 63
End: 2018-12-21
Payer: MEDICARE

## 2018-12-21 ENCOUNTER — APPOINTMENT (OUTPATIENT)
Dept: PHYSICAL THERAPY | Facility: REHABILITATION | Age: 63
End: 2018-12-21
Payer: MEDICARE

## 2018-12-21 ENCOUNTER — APPOINTMENT (OUTPATIENT)
Dept: PHYSICAL THERAPY | Facility: CLINIC | Age: 63
End: 2018-12-21
Payer: MEDICARE

## 2018-12-27 ENCOUNTER — APPOINTMENT (OUTPATIENT)
Dept: OCCUPATIONAL THERAPY | Facility: CLINIC | Age: 63
End: 2018-12-27
Payer: MEDICARE

## 2018-12-27 ENCOUNTER — APPOINTMENT (OUTPATIENT)
Dept: PHYSICAL THERAPY | Facility: CLINIC | Age: 63
End: 2018-12-27
Payer: MEDICARE

## 2018-12-27 NOTE — PROGRESS NOTES
Daily Note     Today's date: 2018  Patient name: Yadira Seaman  : 1955  MRN: [id number]  Referring provider: WILMAN Doss*  Dx: No diagnosis found                 Subjective: ***      Objective: See treatment diary below      Assessment:     Precautions: HTN; Osteoporosis, Raynaud's No CP, Fall hx     Daily Treatment Diary      Manual                Bent over LS L1-5 mobs PA grade 2-3       nv               Prone Quad ST        nv                                                                                             Exercise Diary                Treadmill       Trial nv                                     S/DKTC  10"x10   10"x10  10" x 10               Seated lumbar flexion        nv               Ham ST B     20" x 4                TA contraction 5" 2x10      10" x10                                      TA w/ marching    2x10  2x10  2x 10               Recumbent bike  10'  10'    nv 6 min                clamshells    5" 2x10 ea  5" 2x10 ea   YTB              seated Pball march   2x10    nv               Sit to stand       nv                total gym         nv lvl16              HR/TR                        3 way hip B                                                                                                                                                     Modalities                       HP prn to LS supine        10 min

## 2018-12-28 ENCOUNTER — APPOINTMENT (OUTPATIENT)
Dept: PHYSICAL THERAPY | Facility: REHABILITATION | Age: 63
End: 2018-12-28
Payer: MEDICARE

## 2018-12-28 ENCOUNTER — OFFICE VISIT (OUTPATIENT)
Dept: FAMILY MEDICINE CLINIC | Facility: CLINIC | Age: 63
End: 2018-12-28
Payer: MEDICARE

## 2018-12-28 DIAGNOSIS — M81.0 OSTEOPOROSIS, UNSPECIFIED OSTEOPOROSIS TYPE, UNSPECIFIED PATHOLOGICAL FRACTURE PRESENCE: Primary | ICD-10-CM

## 2018-12-28 PROCEDURE — 96372 THER/PROPH/DIAG INJ SC/IM: CPT | Performed by: FAMILY MEDICINE

## 2018-12-28 NOTE — PROGRESS NOTES
Assessment/Plan:    No problem-specific Assessment & Plan notes found for this encounter  Diagnoses and all orders for this visit:    Osteoporosis, unspecified osteoporosis type, unspecified pathological fracture presence  -     denosumab (PROLIA) subcutaneous injection 60 mg; Inject 1 mL (60 mg total) under the skin once           Subjective:      Patient ID: Dawson Camilo is a 70 y o  female  HPI         Review of Systems      Objective: There were no vitals taken for this visit           Physical Exam

## 2019-01-02 ENCOUNTER — OFFICE VISIT (OUTPATIENT)
Dept: OCCUPATIONAL THERAPY | Facility: CLINIC | Age: 64
End: 2019-01-02
Payer: MEDICARE

## 2019-01-02 ENCOUNTER — OFFICE VISIT (OUTPATIENT)
Dept: PHYSICAL THERAPY | Facility: CLINIC | Age: 64
End: 2019-01-02
Payer: MEDICARE

## 2019-01-02 DIAGNOSIS — M25.532 PAIN IN LEFT WRIST: Primary | ICD-10-CM

## 2019-01-02 DIAGNOSIS — S32.020D CLOSED COMPRESSION FRACTURE OF L2 LUMBAR VERTEBRA WITH ROUTINE HEALING, SUBSEQUENT ENCOUNTER: Primary | ICD-10-CM

## 2019-01-02 DIAGNOSIS — M54.50 CHRONIC BILATERAL LOW BACK PAIN WITHOUT SCIATICA: ICD-10-CM

## 2019-01-02 DIAGNOSIS — G89.29 CHRONIC BILATERAL LOW BACK PAIN WITHOUT SCIATICA: ICD-10-CM

## 2019-01-02 PROCEDURE — 97110 THERAPEUTIC EXERCISES: CPT | Performed by: PHYSICAL THERAPIST

## 2019-01-02 PROCEDURE — 97140 MANUAL THERAPY 1/> REGIONS: CPT | Performed by: PHYSICAL THERAPIST

## 2019-01-02 PROCEDURE — 97112 NEUROMUSCULAR REEDUCATION: CPT | Performed by: PHYSICAL THERAPIST

## 2019-01-02 PROCEDURE — 97110 THERAPEUTIC EXERCISES: CPT | Performed by: OCCUPATIONAL THERAPIST

## 2019-01-02 PROCEDURE — 97140 MANUAL THERAPY 1/> REGIONS: CPT | Performed by: OCCUPATIONAL THERAPIST

## 2019-01-02 NOTE — PROGRESS NOTES
Daily Note     Today's date: 2019  Patient name: Malcolm Shah  : 1955  MRN: [id number]  Referring provider: WILMAN Luevano*  Dx:   Encounter Diagnosis     ICD-10-CM    1  Closed compression fracture of L2 lumbar vertebra with routine healing, subsequent encounter S32 020D    2  Chronic bilateral low back pain without sciatica M54 5     G89 29                   Subjective: Pt presents today stating that she is a little sore, been compliant with HEP and as a whole feeling well  This is her first session since her IE on 18  Objective: See treatment diary below      Assessment: Proceeded with outlined activity without symptom exacerbation  Mild time constraint due to OT appointment as well as arriving 10 mins late  Continue to progress as able       Precautions: HTN; Osteoporosis, Raynaud's No CP, Fall hx     Daily Treatment Diary      Manual     12             Prone LS L1-5 mobs PA grade 2-3       nv  6 min             Prone Quad ST        nv  4 min                                                                                           Exercise Diary     1/2             Treadmill       Trial nv  6 mins                                   S/DKTC  10"x10   10"x10  10" x 10  10" x 10             Seated lumbar flexion  (pball)        nv  Pball 6"  X 15             Ham ST B     20" x 4  20" x 20              TA contraction 5" 2x10      10" x10  10" x 10                                    TA w/ marching    2x10  2x10  2x 10  2 x 10             Recumbent bike  10'  10'    nv 6 min  nv              clamshells    5" 2x10 ea  5" 2x10 ea    20x                                  Sit to stand       nv  nv              total gym         nv lvl16              HR/TR                        3 way hip B                        Tband Row + Ext           RTB            Tband Multi Rot           RTB                                                                                         Modalities         12/20 1/2              HP prn to LS supine        10 min  10 min

## 2019-01-02 NOTE — PROGRESS NOTES
Daily Note     Today's date: 2019  Patient name: Lonny Akhtar  : 1955  MRN: [id number]  Referring provider: Domingo Salter MD  Dx:   Encounter Diagnosis     ICD-10-CM    1  Pain in left wrist M25 532                   Subjective: "It does feel better"      Objective: See treatment diary below      Assessment: Tolerated treatment well  Patient would benefit from continued OT  Reports symptoms have improved over last 2 weeks  Plan: Continue per plan of care         Specialty Daily Treatment Diary     Manual        IASTM ECU 10' 5      ECU stretching 3' 5      KT ECU X x                          Exercise Diary        HEP: ECU stretch, ECU isometrics, CFM Issued        Eccentric ECU  2#      Isometric sup/pro/flex/ext  GPB                                                                                                                                                  Modalities       MHP 10'

## 2019-01-03 DIAGNOSIS — S32.020D CLOSED COMPRESSION FRACTURE OF L2 LUMBAR VERTEBRA WITH ROUTINE HEALING, SUBSEQUENT ENCOUNTER: ICD-10-CM

## 2019-01-03 RX ORDER — OXYCODONE HYDROCHLORIDE 10 MG/1
TABLET ORAL
Qty: 60 TABLET | Refills: 0 | Status: SHIPPED | OUTPATIENT
Start: 2019-01-03 | End: 2019-02-04 | Stop reason: SDUPTHER

## 2019-01-04 ENCOUNTER — OFFICE VISIT (OUTPATIENT)
Dept: PHYSICAL THERAPY | Facility: CLINIC | Age: 64
End: 2019-01-04
Payer: MEDICARE

## 2019-01-04 ENCOUNTER — OFFICE VISIT (OUTPATIENT)
Dept: OCCUPATIONAL THERAPY | Facility: CLINIC | Age: 64
End: 2019-01-04
Payer: MEDICARE

## 2019-01-04 DIAGNOSIS — G89.29 CHRONIC BILATERAL LOW BACK PAIN WITHOUT SCIATICA: ICD-10-CM

## 2019-01-04 DIAGNOSIS — S52.552D CLOSED EXTRAARTICULAR FRACTURE OF DISTAL END OF LEFT RADIUS WITH ROUTINE HEALING: ICD-10-CM

## 2019-01-04 DIAGNOSIS — M25.532 PAIN IN LEFT WRIST: Primary | ICD-10-CM

## 2019-01-04 DIAGNOSIS — M54.50 CHRONIC BILATERAL LOW BACK PAIN WITHOUT SCIATICA: ICD-10-CM

## 2019-01-04 DIAGNOSIS — S32.020D CLOSED COMPRESSION FRACTURE OF L2 LUMBAR VERTEBRA WITH ROUTINE HEALING, SUBSEQUENT ENCOUNTER: Primary | ICD-10-CM

## 2019-01-04 PROCEDURE — 97110 THERAPEUTIC EXERCISES: CPT | Performed by: OCCUPATIONAL THERAPIST

## 2019-01-04 PROCEDURE — 97140 MANUAL THERAPY 1/> REGIONS: CPT | Performed by: OCCUPATIONAL THERAPIST

## 2019-01-04 PROCEDURE — 97110 THERAPEUTIC EXERCISES: CPT | Performed by: PHYSICAL THERAPIST

## 2019-01-04 PROCEDURE — 97140 MANUAL THERAPY 1/> REGIONS: CPT | Performed by: PHYSICAL THERAPIST

## 2019-01-04 NOTE — PROGRESS NOTES
Daily Note     Today's date: 2019  Patient name: Chrissy Maagña  : 1955  MRN: [id number]  Referring provider: Staci Harman MD  Dx:   Encounter Diagnosis     ICD-10-CM    1  Pain in left wrist M25 532    2  Closed extraarticular fracture of distal end of left radius with routine healing S52 552D                   Subjective: "It does feel better"      Objective: See treatment diary below  Assessment: Tolerated treatment well  Patient would benefit from continued OT  Some tenderness to the ECU, no pain with stretching however  Plan: Continue per plan of care         Specialty Daily Treatment Diary     Manual       IASTM ECU 10' 5 12'     ECU stretching 3' 5 3'     KT ECU X x X                         Exercise Diary       HEP: ECU stretch, ECU isometrics, CFM Issued        Eccentric ECU  2# 2#     Isometric sup/pro/flex/ext  GPB      Wrist maze   10x     Extension web   30x      Keypegs   1x                                                                                                                         Modalities      MHP 10'  10'

## 2019-01-04 NOTE — PROGRESS NOTES
Daily Note     Today's date: 2019  Patient name: Laron Coker  : 1955  MRN: [id number]  Referring provider: WILMAN Gabriel*  Dx:   Encounter Diagnosis     ICD-10-CM    1  Closed compression fracture of L2 lumbar vertebra with routine healing, subsequent encounter S32 020D    2  Chronic bilateral low back pain without sciatica M54 5     G89 29                   Subjective: Patient states that she continues to notice moderate amount of soreness on a daily basis and  Increased pain if not taking pain medication         Objective: See treatment diary below  Precautions: HTN; Osteoporosis, Raynaud's No CP, Fall hx     Daily Treatment Diary      Manual              Prone LS L1-5 mobs PA grade 2-3       nv  6 min  6 - TS           Prone Quad ST        nv  4 min  4 - TS                                                                                         Exercise Diary              Treadmill       Trial nv  6 mins  6                                 S/DKTC  10"x10   10"x10  10" x 10  10" x 10  :10-x10            Seated lumbar flexion  (pball)        nv  Pball 6"  X 15  Pball6" x 15            Ham ST B     20" x 4  20" x 20  :20 x 5            TA contraction 5" 2x10      10" x10  10" x 10                                  TA w/ marching    2x10  2x10  2x 10  2 x 10 2x10           Recumbent bike  10'  10'    nv 6 min  nv              clamshells    5" 2x10 ea  5" 2x10 ea    20x RTB x 20                                 Sit to stand       nv  nv              total gym         nv lvl16  lvl 16  x20             HR/TR                        3 way hip B                        Tband Row + Ext           RTB x 20             Tband Multi Rot           RTB                                                                                         Modalities           1          HP prn to LS supine        10 min  10 min  10 min   10 min                                                             Assessment: Tolerated treatment fair  Patient exhibited good technique with therapeutic exercises   Slight increase in soreness with the addition of theraband extension/rows  Plan: Continue per plan of care

## 2019-01-08 ENCOUNTER — OFFICE VISIT (OUTPATIENT)
Dept: PHYSICAL THERAPY | Facility: CLINIC | Age: 64
End: 2019-01-08
Payer: MEDICARE

## 2019-01-08 ENCOUNTER — OFFICE VISIT (OUTPATIENT)
Dept: OCCUPATIONAL THERAPY | Facility: CLINIC | Age: 64
End: 2019-01-08
Payer: MEDICARE

## 2019-01-08 DIAGNOSIS — M25.532 PAIN IN LEFT WRIST: Primary | ICD-10-CM

## 2019-01-08 DIAGNOSIS — S52.552D CLOSED EXTRAARTICULAR FRACTURE OF DISTAL END OF LEFT RADIUS WITH ROUTINE HEALING: ICD-10-CM

## 2019-01-08 DIAGNOSIS — M54.50 CHRONIC BILATERAL LOW BACK PAIN WITHOUT SCIATICA: ICD-10-CM

## 2019-01-08 DIAGNOSIS — S32.020D CLOSED COMPRESSION FRACTURE OF L2 LUMBAR VERTEBRA WITH ROUTINE HEALING, SUBSEQUENT ENCOUNTER: Primary | ICD-10-CM

## 2019-01-08 DIAGNOSIS — G89.29 CHRONIC BILATERAL LOW BACK PAIN WITHOUT SCIATICA: ICD-10-CM

## 2019-01-08 PROCEDURE — 97110 THERAPEUTIC EXERCISES: CPT

## 2019-01-08 PROCEDURE — 97110 THERAPEUTIC EXERCISES: CPT | Performed by: PHYSICAL THERAPIST

## 2019-01-08 PROCEDURE — 97112 NEUROMUSCULAR REEDUCATION: CPT | Performed by: PHYSICAL THERAPIST

## 2019-01-08 PROCEDURE — 97140 MANUAL THERAPY 1/> REGIONS: CPT

## 2019-01-08 NOTE — PROGRESS NOTES
Daily Note     Today's date: 2019  Patient name: Jhon Suresh  : 1955  MRN: [id number]  Referring provider: WILMAN Howell*  Dx:   Encounter Diagnosis     ICD-10-CM    1  Closed compression fracture of L2 lumbar vertebra with routine healing, subsequent encounter S32 020D    2  Chronic bilateral low back pain without sciatica M54 5     G89 29                   Subjective: She was sore following last session  However, she attributes this to muscle and reported it is dissimilar to original complaint  Objective: See treatment diary below      Assessment: Patient tolerated treatment well with no aggravation of sx noted post session  She was provided TB to perform clamshells and scapular/postural activities at home  Min cueing provided to maintain TVA iso during core stabilization exercises  Plan: Continue per plan of care         Precautions: HTN; Osteoporosis, Raynaud's No CP, Fall hx     Daily Treatment Diary      Manual            Prone LS L1-5 mobs PA grade 2-3       nv  6 min  6 - TS  8JH         Prone Quad ST        nv  4 min  4 - TS                                                                                         Exercise Diary            Treadmill       Trial nv  6 mins  6  8                               S/DKTC  10"x10   10"x10  10" x 10  10" x 10  :10-x10   10"x10         Seated lumbar flexion  (pball)        nv  Pball 6"  X 15  Pball6" x 15   pball 6"x15         Ham ST B     20" x 4  20" x 20  :20 x 5  20"x5          TA contraction 5" 2x10      10" x10  10" x 10                                  TA w/ marching    2x10  2x10  2x 10  2 x 10 2x10  2x10         Recumbent bike  10'  10'    nv 6 min  nv              clamshells    5" 2x10 ea  5" 2x10 ea    20x RTB x 20   GTB 20x ea                              Sit to stand       nv  nv              total gym         nv lvl16  lvl 16  x20   lvl16 20x        HR/TR                        3 way hip B                        Tband Row + Ext           RTB x 20   RTB 20x          Tband Multi Rot           RTB  RTB                                                                                       Modalities         12/20 1/2  1/4  1/6          HP prn to LS supine        10 min  10 min  10 min   10 min

## 2019-01-08 NOTE — PROGRESS NOTES
Daily Note     Today's date: 2019  Patient name: Belle Garcia  : 1955  MRN: [id number]  Referring provider: Raul Ball MD  Dx:   Encounter Diagnosis     ICD-10-CM    1  Pain in left wrist M25 532    2  Closed extraarticular fracture of distal end of left radius with routine healing S52 552D                   Subjective: "It does feel better"      Objective: See treatment diary below  Assessment: Tolerated treatment well  Patient would benefit from continued OT  Pt still has some pain with rotation  Plan: Continue per plan of care         Specialty Daily Treatment Diary     Manual      IASTM ECU 10' 5 12' 10'    ECU stretching 3' 5 3' 3'    KT ECU X x X X                        Exercise Diary      HEP: ECU stretch, ECU isometrics, CFM Issued        Eccentric ECU  2# 2# 2# 3x10    Isometric sup/pro/flex/ext  GPB      Wrist maze   10x 10x    Extension web   30x  Yellow 30x    Keypegs   1x 1x                                                                                                                        Modalities     MHP 10'  10' 10'

## 2019-01-11 ENCOUNTER — OFFICE VISIT (OUTPATIENT)
Dept: OCCUPATIONAL THERAPY | Facility: CLINIC | Age: 64
End: 2019-01-11
Payer: MEDICARE

## 2019-01-11 ENCOUNTER — OFFICE VISIT (OUTPATIENT)
Dept: PHYSICAL THERAPY | Facility: CLINIC | Age: 64
End: 2019-01-11
Payer: MEDICARE

## 2019-01-11 DIAGNOSIS — M25.532 PAIN IN LEFT WRIST: Primary | ICD-10-CM

## 2019-01-11 DIAGNOSIS — M54.50 CHRONIC BILATERAL LOW BACK PAIN WITHOUT SCIATICA: ICD-10-CM

## 2019-01-11 DIAGNOSIS — S52.552D CLOSED EXTRAARTICULAR FRACTURE OF DISTAL END OF LEFT RADIUS WITH ROUTINE HEALING: ICD-10-CM

## 2019-01-11 DIAGNOSIS — G89.29 CHRONIC BILATERAL LOW BACK PAIN WITHOUT SCIATICA: ICD-10-CM

## 2019-01-11 DIAGNOSIS — S32.020D CLOSED COMPRESSION FRACTURE OF L2 LUMBAR VERTEBRA WITH ROUTINE HEALING, SUBSEQUENT ENCOUNTER: Primary | ICD-10-CM

## 2019-01-11 PROCEDURE — 97112 NEUROMUSCULAR REEDUCATION: CPT | Performed by: PHYSICAL THERAPIST

## 2019-01-11 PROCEDURE — 97140 MANUAL THERAPY 1/> REGIONS: CPT | Performed by: PHYSICAL THERAPIST

## 2019-01-11 PROCEDURE — 97110 THERAPEUTIC EXERCISES: CPT | Performed by: PHYSICAL THERAPIST

## 2019-01-11 PROCEDURE — 97110 THERAPEUTIC EXERCISES: CPT | Performed by: OCCUPATIONAL THERAPIST

## 2019-01-11 PROCEDURE — 97140 MANUAL THERAPY 1/> REGIONS: CPT | Performed by: OCCUPATIONAL THERAPIST

## 2019-01-11 NOTE — PROGRESS NOTES
Daily Note     Today's date: 2019  Patient name: Edwin Yeboah  : 1955  MRN: [id number]  Referring provider: Cynthia Givens MD  Dx:   Encounter Diagnosis     ICD-10-CM    1  Pain in left wrist M25 532    2  Closed extraarticular fracture of distal end of left radius with routine healing S52 552D                   Subjective: "It does feel better"      Objective: See treatment diary below  Assessment: Tolerated treatment well  Patient would benefit from continued OT  ECU pain and tenderness is subsiding  Plan: Continue per plan of care         Specialty Daily Treatment Diary     Manual     IASTM ECU 10' 5 12' 10' 10'   ECU stretching 3' 5 3' 3' 2'   KT ECU X x X X                        Exercise Diary     HEP: ECU stretch, ECU isometrics, CFM Issued        Eccentric ECU  2# 2# 2# 3x10 3# 3x10    Isometric sup/pro/flex/ext  GPB   GFB 2x10   Wrist maze   10x 10x    Extension web   30x  Yellow 30x Yellow 3x10    Keypegs   1x 1x 1x    Green ball on wall circles     3x10                                                                                                               Modalities    MHP 10'  10' 10' 3'

## 2019-01-11 NOTE — PROGRESS NOTES
Daily Note     Today's date: 2019  Patient name: Christopher Kaba  : 1955  MRN: [id number]  Referring provider: WILMAN Luke*  Dx:   Encounter Diagnosis     ICD-10-CM    1  Closed compression fracture of L2 lumbar vertebra with routine healing, subsequent encounter S32 020D    2  Chronic bilateral low back pain without sciatica M54 5     G89 29                   Subjective: States that she has generalized soreness from a long day  Of work and the temperature but otherwise she is feeling well  Better today than yesterday  Objective: See treatment diary below      Assessment:     Plan: Continue per plan of care         Precautions: HTN; Osteoporosis, Raynaud's No CP, Fall hx     Daily Treatment Diary      Manual          Prone LS L1-5 mobs PA grade 2-3       nv  6 min  6 - TS  8JH  6       Prone Quad ST        nv  4 min  4 - TS    4                                                                                     Exercise Diary          Treadmill       Trial nv  6 mins  6  8  7 min                             S/DKTC  10"x10   10"x10  10" x 10  10" x 10  :10-x10   10"x10  10" x 10       Seated lumbar flexion  (pball)        nv  Pball 6"  X 15  Pball6" x 15   pball 6"x15  pball 6' x 15       Ham ST B     20" x 4  20" x 20  :20 x 5  20"x5  20" 5        TA contraction 5" 2x10      10" x10  10" x 10                                  TA w/ marching    2x10  2x10  2x 10  2 x 10 2x10  2x10  2 x 10       Recumbent bike  10'  10'    nv 6 min  nv              clamshells    5" 2x10 ea  5" 2x10 ea    20x RTB x 20   GTB 20x ea  GTB 20x                            Sit to stand       nv  nv              total gym         nv lvl16  lvl 16  x20   lvl16 20x  Mini Squat 2 x 10                               3 way hip B                2 x 10        Tband Row + Ext           RTB x 20   RTB 20x  GTB 2 x 10        Tband Multi Rot           RTB  RTB  GTB 2 x 10                                                                                     Modalities         12/20 1/2 1/4 1/6 1/11      HP prn to LS supine        10 min  10 min  10 min   10 min    10 min prone

## 2019-01-15 ENCOUNTER — OFFICE VISIT (OUTPATIENT)
Dept: OCCUPATIONAL THERAPY | Facility: CLINIC | Age: 64
End: 2019-01-15
Payer: MEDICARE

## 2019-01-15 ENCOUNTER — EVALUATION (OUTPATIENT)
Dept: PHYSICAL THERAPY | Facility: CLINIC | Age: 64
End: 2019-01-15
Payer: MEDICARE

## 2019-01-15 DIAGNOSIS — G89.29 CHRONIC BILATERAL LOW BACK PAIN WITHOUT SCIATICA: ICD-10-CM

## 2019-01-15 DIAGNOSIS — S32.020D CLOSED COMPRESSION FRACTURE OF L2 LUMBAR VERTEBRA WITH ROUTINE HEALING, SUBSEQUENT ENCOUNTER: Primary | ICD-10-CM

## 2019-01-15 DIAGNOSIS — M54.50 CHRONIC BILATERAL LOW BACK PAIN WITHOUT SCIATICA: ICD-10-CM

## 2019-01-15 DIAGNOSIS — M25.532 PAIN IN LEFT WRIST: Primary | ICD-10-CM

## 2019-01-15 DIAGNOSIS — S52.552D CLOSED EXTRAARTICULAR FRACTURE OF DISTAL END OF LEFT RADIUS WITH ROUTINE HEALING: ICD-10-CM

## 2019-01-15 PROCEDURE — 97110 THERAPEUTIC EXERCISES: CPT | Performed by: PHYSICAL THERAPIST

## 2019-01-15 PROCEDURE — G8982 BODY POS GOAL STATUS: HCPCS | Performed by: PHYSICAL THERAPIST

## 2019-01-15 PROCEDURE — 97140 MANUAL THERAPY 1/> REGIONS: CPT | Performed by: PHYSICAL THERAPIST

## 2019-01-15 PROCEDURE — G8981 BODY POS CURRENT STATUS: HCPCS | Performed by: PHYSICAL THERAPIST

## 2019-01-15 PROCEDURE — 97112 NEUROMUSCULAR REEDUCATION: CPT | Performed by: PHYSICAL THERAPIST

## 2019-01-15 PROCEDURE — 97140 MANUAL THERAPY 1/> REGIONS: CPT

## 2019-01-15 PROCEDURE — 97110 THERAPEUTIC EXERCISES: CPT

## 2019-01-15 NOTE — PROGRESS NOTES
PT Re-Evaluation     Today's date: 1/15/2019  Patient name: Marcelo Zaman  : 1955  MRN: [id number]  Referring provider: WILMAN Chandra*  Dx:   Encounter Diagnosis     ICD-10-CM    1  Closed compression fracture of L2 lumbar vertebra with routine healing, subsequent encounter S32 020D    2  Chronic bilateral low back pain without sciatica M54 5     G89 29                   Assessment  Assessment details: Pt is progressing well at this time  They have demonstrated improvement in pain levels, endurance, strength, flexibility as well as tolerance to activity  They have achieved all STGs sought out for them as well as by them  They will benefit continued Skilled PT intervention in order to achieve all LTGs sought out for them as well as by them in order to perform all desired activities with minimal to nil symptom exacerbation  Thank you very much for this kind and motivated referral           Impairments: abnormal or restricted ROM, activity intolerance, impaired physical strength, pain with function and poor posture   Understanding of Dx/Px/POC: good   Prognosis: good    Goals  STG 4 Weeks:  Decrease pain at worst to 5/10 -met  Improve range to SLR to 60, Ely 105 -met  Improve strength to TA draw 20", hip to 4-  -met  Independent with HEP -met  LTG 8 Weeks:  Decrease pain at worst to 2/10  Improve range to SLR 65, Ely 105  Improve strength to 30" TA Draw, hip 4/5 or greater     Able to perform all desired activities with minimal to nil symptom exacerbation      Plan  Patient would benefit from: skilled physical therapy  Planned modality interventions: cryotherapy and thermotherapy: hydrocollator packs  Planned therapy interventions: joint mobilization, manual therapy, abdominal trunk stabilization, patient education, postural training, stretching, strengthening, therapeutic exercise, therapeutic training, home exercise program, graded motor, graded exercise, graded activity, gait training, functional ROM exercises and flexibility  Frequency: 2x week  Duration in weeks: 8  Treatment plan discussed with: patient        Subjective Evaluation    History of Present Illness  Date of onset: 2018  Mechanism of injury: Pt presents today stating that she is feeling well  She reports pain at worst is 5/10 which is most fatigue and "Pressure" due to working 6 hour days before rest and she feels that about her limit at this time  She declines pain down her legs, she is happy with progression and would like to continue 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  Pt does not have a follow up with her physician at this time as indicated surgery was only other option     Pain  Current pain ratin  At best pain ratin  At worst pain ratin  Quality: dull ache, throbbing, pressure and sharp  Relieving factors: change in position  Aggravating factors: standing and walking  Progression: improved      Diagnostic Tests  X-ray: abnormal (Fx L2 and L wrist  )  Treatments  Previous treatment: physical therapy  Current treatment: medication, physical therapy and occupational therapy  Patient Goals  Patient goals for therapy: decreased pain, increased motion, increased strength, return to sport/leisure activities and return to work          Objective     Active Range of Motion     Lumbar   Flexion: 65 degrees with pain  Extension: 10 degrees with pain  Left lateral flexion: 20 degrees   Right lateral flexion: 20 degrees   Left rotation: 75 degrees   Right rotation: 75 degrees     Additional Active Range of Motion Details  Forward head, rounded shoulders, decreased Lordosis  B/L Sensation intact to L3,4,5,S1,S2  DTR Patellar B/L 1+   Repeated motion   Flex stiffness no radicular  Ext stiffness no radicular  SB R stiffness no radicular  SB L stiffness no radicular  Hip Strength  L Flex 4 Ext 4 Abd 4- Add 5  R Flex 4- Ext 5 Abd 4-  Add 5  LE Screen  Strong and pain less  Joint Mobility:  Grade 2 L1-2, Grade 3 L3-S1     Palpation: Pain along L1-2-3  STs  L SLR + 60, (-) Fadir, (-) Tamia, (-) Scour, (-) SI Distraction/Compression, + Distraction, + Ely 105  R  SLR + 60, (-) Fadir, (-) Tamia, (-) Scour, (-) SI Distraction/Compression, + Distraction, + Ely 105  TA Draw 20"            Precautions: HTN; Osteoporosis, Raynaud's No CP, Fall hx     Daily Treatment Diary      Manual   12/7      12/20  1/2  1/4  1/8  1/11  1/15     Prone LS L1-5 mobs PA grade 2-3       nv  6 min  6 - TS  8JH  6  6 min     Prone Quad ST        nv  4 min  4 - TS    4  4 min                                                                                   Exercise Diary   12/7  12/12  12/14  12/20  1/2  1/4  1/8  1/11  1/15     Treadmill       Trial nv  6 mins  6  8  7 min  7 min                           S/DKTC  10"x10   10"x10  10" x 10  10" x 10  :10-x10   10"x10  10" x 10  10" x 10     Seated lumbar flexion  (pball)        nv  Pball 6"  X 15  Pball6" x 15   pball 6"x15  pball 6' x 15  Pbal     Ham ST B     20" x 4  20" x 20  :20 x 5  20"x5  20" 5  20" x 5      TA contraction 5" 2x10      10" x10  10" x 10                                  TA w/ marching    2x10  2x10  2x 10  2 x 10 2x10  2x10  2 x 10  2 x 10     Recumbent bike  10'  10'    nv 6 min  nv              clamshells    5" 2x10 ea  5" 2x10 ea    20x RTB x 20   GTB 20x ea  GTB 20x  GTB 20x                          Sit to stand       nv  nv             Mini Squat             2 x 10                            3 way hip B                2 x 10  2 x 10      Tband Row + Ext           RTB x 20   RTB 20x  GTB 2 x 10  GTB 2 x 10      Tband Multi Rot           RTB  RTB  GTB 2 x 10  GTB 2 x 10                                                                                   Modalities         12/20 1/2  1/4  1/6    1/11  1/15    HP prn to LS supine        10 min  10 min  10 min   10 min    10 min prone  10 min prone                                                     S of PT TAS til 1035, rest with PT WA

## 2019-01-17 ENCOUNTER — OFFICE VISIT (OUTPATIENT)
Dept: OCCUPATIONAL THERAPY | Facility: CLINIC | Age: 64
End: 2019-01-17
Payer: MEDICARE

## 2019-01-17 DIAGNOSIS — M25.532 PAIN IN LEFT WRIST: Primary | ICD-10-CM

## 2019-01-17 PROCEDURE — 97140 MANUAL THERAPY 1/> REGIONS: CPT | Performed by: OCCUPATIONAL THERAPIST

## 2019-01-17 PROCEDURE — 97530 THERAPEUTIC ACTIVITIES: CPT | Performed by: OCCUPATIONAL THERAPIST

## 2019-01-17 NOTE — PROGRESS NOTES
Daily Note     Today's date: 2019  Patient name: Fabiana Moran  : 1955  MRN: [id number]  Referring provider: Noé White MD  Dx:   Encounter Diagnosis     ICD-10-CM    1  Pain in left wrist M25 532                   Subjective: "It does feel good "      Objective: See treatment diary below  Assessment: Tolerated treatment well  Patient would benefit from continued OT  Doing well with PREs, advancing as tolerated  Plan: Continue per plan of care         Specialty Daily Treatment Diary     Manual  12/20 1/2 1/4 1/8 1/11 1/15 1/17     IASTM ECU 10' 5 12' 10' 10' 10' 10     ECU stretching 3' 5 3' 3' 2' 3' 2     KT ECU X x X X  applied x                                 Exercise Diary  12/20 1/2 1/4 1/8 1/11 1/15 1/17     HEP: ECU stretch, ECU isometrics, CFM Issued            Eccentric ECU  2# 2# 2# 3x10 3# 3x10  3# 3x10 3# 3x10     Isometric sup/pro/flex/ext  GPB   GFB 2x10 GFB 2x10 GFB f/e/s/p 3x10     Wrist maze   10x 10x        Extension web   30x  Yellow 30x Yellow 3x10  Yellow 3x10 Y 3x10     Keypegs   1x 1x 1x  1x 1x      Green ball on wall circles     3x10 3x10 3x10                                                                                                                                                                     Modalities 12/20 1/2 1/4 1/8 1/11 1/15 1/17     MHP 10'  10' 10' 3' 10' 15

## 2019-01-18 ENCOUNTER — APPOINTMENT (OUTPATIENT)
Dept: OCCUPATIONAL THERAPY | Facility: CLINIC | Age: 64
End: 2019-01-18
Payer: MEDICARE

## 2019-01-18 ENCOUNTER — OFFICE VISIT (OUTPATIENT)
Dept: PHYSICAL THERAPY | Facility: CLINIC | Age: 64
End: 2019-01-18
Payer: MEDICARE

## 2019-01-18 DIAGNOSIS — M54.50 CHRONIC BILATERAL LOW BACK PAIN WITHOUT SCIATICA: ICD-10-CM

## 2019-01-18 DIAGNOSIS — S32.020D CLOSED COMPRESSION FRACTURE OF L2 LUMBAR VERTEBRA WITH ROUTINE HEALING, SUBSEQUENT ENCOUNTER: Primary | ICD-10-CM

## 2019-01-18 DIAGNOSIS — G89.29 CHRONIC BILATERAL LOW BACK PAIN WITHOUT SCIATICA: ICD-10-CM

## 2019-01-18 PROCEDURE — 97112 NEUROMUSCULAR REEDUCATION: CPT | Performed by: PHYSICAL THERAPIST

## 2019-01-18 PROCEDURE — 97110 THERAPEUTIC EXERCISES: CPT | Performed by: PHYSICAL THERAPIST

## 2019-01-18 PROCEDURE — 97140 MANUAL THERAPY 1/> REGIONS: CPT | Performed by: PHYSICAL THERAPIST

## 2019-01-18 NOTE — PROGRESS NOTES
Daily Note     Today's date: 2019  Patient name: Roseanna Juan  : 1955  MRN: [id number]  Referring provider: WILMAN Garvin*  Dx:   Encounter Diagnosis     ICD-10-CM    1  Closed compression fracture of L2 lumbar vertebra with routine healing, subsequent encounter S32 020D    2  Chronic bilateral low back pain without sciatica M54 5     G89 29                   Subjective: Pt presents today stating that she is sore from the other day, states that she is only able to make it about 6 hours before her back feels very tight sore and requiring rest         Objective: See treatment diary below      Assessment:  Education about her LS strength endurance is likely causing the largest grief and strength training and endurance reinforcement is likely to allow her to be able to make improvement over all  Plan: Continue per plan of care         Precautions: HTN; Osteoporosis, Raynaud's No CP, Fall hx     Daily Treatment Diary      Manual     1/2  1/4  1/8  1/11  1/15  1/18   Prone LS L1-5 mobs PA grade 2-3       nv  6 min  6 - TS  8JH  6  6 min  6 min    Prone Quad ST        nv  4 min  4 - TS    4  4 min  4 min                                                                                 Exercise Diary   12/7  12/12  12/14  12/20  1/2  1/4  1/8  1/11  1/15  1/18   Treadmill       Trial nv  6 mins  6  8  7 min  7 min  7 min                         S/DKTC  10"x10   10"x10  10" x 10  10" x 10  :10-x10   10"x10  10" x 10  10" x 10  10" x10   Seated lumbar flexion  (pball)        nv  Pball 6"  X 15  Pball6" x 15   pball 6"x15  pball 6' x 15  Pbal  Pball 6" x 15   Ham ST B     20" x 4  20" x 20  :20 x 5  20"x5  20" 5  20" x 5      TA contraction 5" 2x10      10" x10  10" x 10                                  TA w/ marching    2x10  2x10  2x 10  2 x 10 2x10  2x10  2 x 10  2 x 10  2 x 10   Recumbent bike  10'  10'    nv 6 min  nv              clamshells    5" 2x10 ea  5" 2x10 ea    20x RTB x 20  GTB 20x ea  GTB 20x  GTB 20x  GTB 20x                        Sit to stand       nv  nv             Mini Squat             2 x 10 2 x 10                           3 way hip B                2 x 10  2 x 10  2 x 10    Tband Row + Ext           RTB x 20   RTB 20x  GTB 2 x 10  GTB 2 x 10  GTB 2 x 10    Tband Multi Rot           RTB  RTB  GTB 2 x 10  GTB 2 x 10  GTB 2 x 10    Side Step                   RTB nv                                                         Modalities   1/18 12/20 1/2  1/4  1/6    1/11  1/15    HP prn to LS supine  decline      10 min  10 min  10 min   10 min    10 min prone  10 min prone

## 2019-01-20 DIAGNOSIS — I10 BENIGN ESSENTIAL HYPERTENSION: ICD-10-CM

## 2019-01-22 ENCOUNTER — OFFICE VISIT (OUTPATIENT)
Dept: OCCUPATIONAL THERAPY | Facility: CLINIC | Age: 64
End: 2019-01-22
Payer: MEDICARE

## 2019-01-22 ENCOUNTER — OFFICE VISIT (OUTPATIENT)
Dept: PHYSICAL THERAPY | Facility: CLINIC | Age: 64
End: 2019-01-22
Payer: MEDICARE

## 2019-01-22 DIAGNOSIS — M54.50 CHRONIC BILATERAL LOW BACK PAIN WITHOUT SCIATICA: ICD-10-CM

## 2019-01-22 DIAGNOSIS — S32.020D CLOSED COMPRESSION FRACTURE OF L2 LUMBAR VERTEBRA WITH ROUTINE HEALING, SUBSEQUENT ENCOUNTER: Primary | ICD-10-CM

## 2019-01-22 DIAGNOSIS — S52.552D CLOSED EXTRAARTICULAR FRACTURE OF DISTAL END OF LEFT RADIUS WITH ROUTINE HEALING: Primary | ICD-10-CM

## 2019-01-22 DIAGNOSIS — G89.29 CHRONIC BILATERAL LOW BACK PAIN WITHOUT SCIATICA: ICD-10-CM

## 2019-01-22 PROCEDURE — 97110 THERAPEUTIC EXERCISES: CPT | Performed by: OCCUPATIONAL THERAPIST

## 2019-01-22 PROCEDURE — 97140 MANUAL THERAPY 1/> REGIONS: CPT | Performed by: OCCUPATIONAL THERAPIST

## 2019-01-22 PROCEDURE — 97110 THERAPEUTIC EXERCISES: CPT | Performed by: PHYSICAL THERAPIST

## 2019-01-22 PROCEDURE — 97140 MANUAL THERAPY 1/> REGIONS: CPT | Performed by: PHYSICAL THERAPIST

## 2019-01-22 NOTE — PROGRESS NOTES
Daily Note     Today's date: 2019  Patient name: Melida Ortiz  : 1955  MRN: [id number]  Referring provider: Geno Wilburn MD  Dx:   Encounter Diagnosis     ICD-10-CM    1  Closed extraarticular fracture of distal end of left radius with routine healing S52 552D                   Subjective: "I think it's getting better"      Objective: See treatment diary below  Assessment: Tolerated treatment well  Patient would benefit from continued OT  Tolerating well still, ECU still somewhat painful  Plan: Continue per plan of care         Specialty Daily Treatment Diary     Manual  12/20 1/2 1/4 1/8 1/11 1/15 1/17 1/22    IASTM ECU 10' 5 12' 10' 10' 10' 10 10    ECU stretching 3' 5 3' 3' 2' 3' 2     KT ECU X x X X  applied x                                 Exercise Diary  12/20 1/2 1/4 1/8 1/11 1/15 1/17 1/22    HEP: ECU stretch, ECU isometrics, CFM Issued            Eccentric ECU  2# 2# 2# 3x10 3# 3x10  3# 3x10 3# 3x10 3# 3x10    Isometric sup/pro/flex/ext  GPB   GFB 2x10 GFB 2x10 GFB f/e/s/p 3x10 GFB all planes 3x10    Wrist maze   10x 10x        Extension web   30x  Yellow 30x Yellow 3x10  Yellow 3x10 Y 3x10 y 3x 10    Keypegs   1x 1x 1x  1x 1x      Green ball on wall circles     3x10 3x10 3x10                                                                                                                                                                     Modalities 12/20 1/2 1/4 1/8 1/11 1/15 1/17 1/22     MHP 10'  10' 10' 3' 10' 15 15

## 2019-01-22 NOTE — PROGRESS NOTES
Daily Note     Today's date: 2019  Patient name: Evelyn Fair  : 1955  MRN: [id number]  Referring provider: WILMAN Maciel*  Dx:   Encounter Diagnosis     ICD-10-CM    1  Closed compression fracture of L2 lumbar vertebra with routine healing, subsequent encounter S32 020D    2  Chronic bilateral low back pain without sciatica M54 5     G89 29                   Subjective: Pt presents today stating that she is sore from the other day, states that she is only able to make it about 6 hours before her back feels very tight sore and requiring rest         Objective: See treatment diary below      Assessment:  Proceeded with Bands for 3 way hip as well as Tband Side steps without symptom exacerbation just fatigue  Continue to progress as able  S of PT TAS til 1035 rest with PT WA  Plan: Continue per plan of care         Precautions: HTN; Osteoporosis, Raynaud's No CP, Fall hx     Daily Treatment Diary      Manual    1/2  1/4  1/8  1/11  1/15  1/18   Prone LS L1-5 mobs PA grade 2-3 6 min      nv  6 min  6 - TS  8JH  6  6 min  6 min    Prone Quad ST  4 min      nv  4 min  4 - TS    4  4 min  4 min                                                                                 Exercise Diary  /2  /4  1/8  1/11  1/15  1/18   Treadmill  7 min     Trial nv  6 mins  6  8  7 min  7 min  7 min                         S/DKTC  10"x10   10"x10  10" x 10  10" x 10  :10-x10   10"x10  10" x 10  10" x 10  10" x10   Seated lumbar flexion  (pball)  Pball 6" x 15      nv  Pball 6"  X 15  Pball6" x 15   pball 6"x15  pball 6' x 15  Pbal  Pball 6" x 15   Ham ST B     20" x 4  20" x 20  :20 x 5  20"x5  20" 5  20" x 5                                               TA w/ marching  2 x 10  2x10  2x10  2x 10  2 x 10 2x10  2x10  2 x 10  2 x 10  2 x 10                       clamshells  GTB  5" 2x10 ea  5" 2x10 ea    20x RTB x 20   GTB 20x ea  GTB 20x  GTB 20x  GTB 20x                        Sit to stand  2 x 10     nv  nv             Mini Squat  2 x 10           2 x 10 2 x 10                           3 way hip B  2 x 10 RTB              2 x 10  2 x 10  2 x 10    Tband Row + Ext  GTB 2 x 10         RTB x 20   RTB 20x  GTB 2 x 10  GTB 2 x 10  GTB 2 x 10    Tband Multi Rot  GTB 2 x 10         RTB  RTB  GTB 2 x 10  GTB 2 x 10  GTB 2 x 10    Side Step  RTB  4 Laps                 RTB nv                                                         Modalities  1/22      12/20 1/2  1/4  1/6    1/11  1/15    HP prn to LS supine  10 min      10 min  10 min  10 min   10 min    10 min prone  10 min prone

## 2019-01-25 ENCOUNTER — OFFICE VISIT (OUTPATIENT)
Dept: PHYSICAL THERAPY | Facility: CLINIC | Age: 64
End: 2019-01-25
Payer: MEDICARE

## 2019-01-25 ENCOUNTER — OFFICE VISIT (OUTPATIENT)
Dept: OCCUPATIONAL THERAPY | Facility: CLINIC | Age: 64
End: 2019-01-25
Payer: MEDICARE

## 2019-01-25 DIAGNOSIS — M25.532 PAIN IN LEFT WRIST: ICD-10-CM

## 2019-01-25 DIAGNOSIS — M54.50 CHRONIC BILATERAL LOW BACK PAIN WITHOUT SCIATICA: ICD-10-CM

## 2019-01-25 DIAGNOSIS — G89.29 CHRONIC BILATERAL LOW BACK PAIN WITHOUT SCIATICA: ICD-10-CM

## 2019-01-25 DIAGNOSIS — S32.020D CLOSED COMPRESSION FRACTURE OF L2 LUMBAR VERTEBRA WITH ROUTINE HEALING, SUBSEQUENT ENCOUNTER: Primary | ICD-10-CM

## 2019-01-25 DIAGNOSIS — S52.552D CLOSED EXTRAARTICULAR FRACTURE OF DISTAL END OF LEFT RADIUS WITH ROUTINE HEALING: Primary | ICD-10-CM

## 2019-01-25 PROCEDURE — 97140 MANUAL THERAPY 1/> REGIONS: CPT | Performed by: OCCUPATIONAL THERAPIST

## 2019-01-25 PROCEDURE — 97140 MANUAL THERAPY 1/> REGIONS: CPT | Performed by: PHYSICAL THERAPIST

## 2019-01-25 PROCEDURE — 97110 THERAPEUTIC EXERCISES: CPT | Performed by: PHYSICAL THERAPIST

## 2019-01-25 NOTE — PROGRESS NOTES
Daily Note     Today's date: 2019  Patient name: Polo Hanley  : 1955  MRN: [id number]  Referring provider: WILMAN Ga*  Dx:   Encounter Diagnosis     ICD-10-CM    1  Closed compression fracture of L2 lumbar vertebra with routine healing, subsequent encounter S32 020D    2  Chronic bilateral low back pain without sciatica M54 5     G89 29                   Subjective: Pt presents today stating that she is still quite a bit sore from Tuesday's session within her legs  Not her back as this feels quite well, but also indicated she had to go to further training and arrived late to session and would prefer to leave slightly early  Objective: See treatment diary below      Assessment:  Session modified as pt still very sore s/p last session  Return to prior session intensity as able  S of PT TAS until 1040, rest with PT LH  Plan: Continue per plan of care         Precautions: HTN; Osteoporosis, Raynaud's No CP, Fall hx     Daily Treatment Diary      Manual    1/2  1/4  1/8  1/11  1/15  1/18   Prone LS L1-5 mobs PA grade 2-3 6 min  6 min    nv  6 min  6 - TS  8JH  6  6 min  6 min    Prone Quad ST  4 min  4 min    nv  4 min  4 - TS    4  4 min  4 min                                                                                 Exercise Diary    1/2  1/4  1/8  1/11  1/15  1/18   Treadmill  7 min  7 min   Trial nv  6 mins  6  8  7 min  7 min  7 min                         S/DKTC  10"x10  10" x 10" 10"x10  10" x 10  10" x 10  :10-x10   10"x10  10" x 10  10" x 10  10" x10   Seated lumbar flexion  (pball)  Pball 6" x 15  Pball 6" x 15    nv  Pball 6"  X 15  Pball6" x 15   pball 6"x15  pball 6' x 15  Pbal  Pball 6" x 15                                                            TA w/ marching  2 x 10  2x10  2x10  2x 10  2 x 10 2x10  2x10  2 x 10  2 x 10  2 x 10                       clamshells  GTB  GTB 2x10 ea  5" 2x10 ea    20x RTB x 20   GTB 20x ea  GTB 20x  GTB 20x  GTB 20x                        Sit to stand  2 x 10  nv   nv  nv             Mini Squat  2 x 10 2 x 10         2 x 10 2 x 10                           3 way hip B  2 x 10 RTB  NV RTB          2 x 10  2 x 10  2 x 10    Tband Row + Ext  GTB 2 x 10  GTB 2 x 10       RTB x 20   RTB 20x  GTB 2 x 10  GTB 2 x 10  GTB 2 x 10    Tband Multi Rot  GTB 2 x 10  GTB 2 x 10       RTB  RTB  GTB 2 x 10  GTB 2 x 10  GTB 2 x 10    Side Step  RTB  4 Laps  nv               RTB nv                                                         Modalities  1/22 1/25 12/20 1/2  1/4  1/6    1/11  1/15    HP prn to LS supine  10 min  10 min    10 min  10 min  10 min   10 min    10 min prone  10 min prone

## 2019-01-25 NOTE — PROGRESS NOTES
Daily Note     Today's date: 2019  Patient name: Dale Ronquillo  : 1955  MRN: [id number]  Referring provider: Damaso Brown MD  Dx:   Encounter Diagnosis     ICD-10-CM    1  Closed extraarticular fracture of distal end of left radius with routine healing S52 552D    2  Pain in left wrist M25 532                   Subjective: requested a shortened visit today so she could get to work  Objective: See treatment diary below  Assessment: Tolerated treatment well  Patient would benefit from continued OT  Forearm rotation is the most painful motion, and there is slight tension on the ECU with stretch, but she is improving overall and weight bearing exercise and resistance is greatly improved per her report  Plan: Continue per plan of care         Specialty Daily Treatment Diary     Manual  12/20 1/2 1/4 1/8 1/11 1/15 1/17 1/22 1/25   IASTM ECU 10' 5 15' 10' 10' 10' 10 10 10'   ECU stretching 3' 5 3' 3' 2' 3' 2  2'   KT ECU X x X X  applied x                                 Exercise Diary  12/20 1/2 1/4 1/8 1/11 1/15 1/17 1/22    HEP: ECU stretch, ECU isometrics, CFM Issued            Eccentric ECU  2# 2# 2# 3x10 3# 3x10  3# 3x10 3# 3x10 3# 3x10    Isometric sup/pro/flex/ext  GPB   GFB 2x10 GFB 2x10 GFB f/e/s/p 3x10 GFB all planes 3x10    Wrist maze   10x 10x        Extension web   30x  Yellow 30x Yellow 3x10  Yellow 3x10 Y 3x10 y 3x 10    Keypegs   1x 1x 1x  1x 1x      Green ball on wall circles     3x10 3x10 3x10                                                                                                                                                                     Modalities 12/20 1/2 1/4 1/8 1/11 1/15 1/17 1/22  1/25   MHP 10'  10' 10' 3' 10' 15 15 10'

## 2019-01-29 ENCOUNTER — OFFICE VISIT (OUTPATIENT)
Dept: OCCUPATIONAL THERAPY | Facility: CLINIC | Age: 64
End: 2019-01-29
Payer: MEDICARE

## 2019-01-29 ENCOUNTER — OFFICE VISIT (OUTPATIENT)
Dept: PHYSICAL THERAPY | Facility: CLINIC | Age: 64
End: 2019-01-29
Payer: MEDICARE

## 2019-01-29 DIAGNOSIS — S32.020D CLOSED COMPRESSION FRACTURE OF L2 LUMBAR VERTEBRA WITH ROUTINE HEALING, SUBSEQUENT ENCOUNTER: Primary | ICD-10-CM

## 2019-01-29 DIAGNOSIS — M54.50 CHRONIC BILATERAL LOW BACK PAIN WITHOUT SCIATICA: ICD-10-CM

## 2019-01-29 DIAGNOSIS — G89.29 CHRONIC BILATERAL LOW BACK PAIN WITHOUT SCIATICA: ICD-10-CM

## 2019-01-29 DIAGNOSIS — S52.552D CLOSED EXTRAARTICULAR FRACTURE OF DISTAL END OF LEFT RADIUS WITH ROUTINE HEALING: Primary | ICD-10-CM

## 2019-01-29 PROCEDURE — 97140 MANUAL THERAPY 1/> REGIONS: CPT | Performed by: OCCUPATIONAL THERAPIST

## 2019-01-29 PROCEDURE — 97110 THERAPEUTIC EXERCISES: CPT | Performed by: PHYSICAL THERAPIST

## 2019-01-29 PROCEDURE — 97112 NEUROMUSCULAR REEDUCATION: CPT | Performed by: PHYSICAL THERAPIST

## 2019-01-29 PROCEDURE — 97140 MANUAL THERAPY 1/> REGIONS: CPT | Performed by: PHYSICAL THERAPIST

## 2019-01-29 PROCEDURE — 97530 THERAPEUTIC ACTIVITIES: CPT | Performed by: OCCUPATIONAL THERAPIST

## 2019-01-29 NOTE — PROGRESS NOTES
Daily Note     Today's date: 2019  Patient name: Harinder Day  : 1955  MRN: [id number]  Referring provider: WILMAN Kaye*  Dx:   Encounter Diagnosis     ICD-10-CM    1  Closed compression fracture of L2 lumbar vertebra with routine healing, subsequent encounter S32 020D    2  Chronic bilateral low back pain without sciatica M54 5     G89 29                   Subjective: Pt presents today stating that she is feeling well  Minor aches and pains after her 6 hour threshold  Objective: See treatment diary below      Assessment:  Able to proceed with strengthening with band work, less fatigue evident  S of PT TAS til 1035, rest with PT WA  Plan: Continue per plan of care  Precautions: HTN; Osteoporosis, Raynaud's No CP, Fall hx     Daily Treatment Diary      Manual    1/  1/4  1/8  1/11  1/15  1/18   Prone LS L1-5 mobs PA grade 2-3 6 min  6 min  6min  nv  6 min  6 - TS  8JH  6  6 min  6 min    Prone Quad ST  4 min  4 min  4 min  nv  4 min  4 - TS    4  4 min  4 min                                                                                 Exercise Diary    1/2  1/4  1/8  1/11  1/15  1/18   Treadmill  7 min  7 min  7 mins Trial nv  6 mins  6  8  7 min  7 min  7 min                         S/DKTC  10"x10  10" x 10" 10"x10  10" x 10  10" x 10  :10-x10   10"x10  10" x 10  10" x 10  10" x10   Seated lumbar flexion  (pball)  Pball 6" x 15  Pball 6" x 15  Pball 6" x 10  nv  Pball 6"  X 15  Pball6" x 15   pball 6"x15  pball 6' x 15  Pbal  Pball 6" x 15                                                            TA w/ marching  2 x 10  2x10  2x10  2x 10  2 x 10 2x10  2x10  2 x 10  2 x 10  2 x 10                       clamshells  GTB  GTB 2x10 ea   GTB 2 x 10    20x RTB x 20   GTB 20x ea  GTB 20x  GTB 20x  GTB 20x                        Sit to stand  2 x 10  nv 2 x 10  nv  nv             Mini Squat  2 x 10 2 x 10  2 x 10       2 x 10 2 x 10                        3 way hip B  2 x 10 RTB  NV RTB 2 x 10          2 x 10  2 x 10  2 x 10    Tband Row + Ext  GTB 2 x 10  GTB 2 x 10  GTB 2 x 10     RTB x 20   RTB 20x  GTB 2 x 10  GTB 2 x 10  GTB 2 x 10    Tband Multi Rot  GTB 2 x 10  GTB 2 x 10  GTB 2 x 10     RTB  RTB  GTB 2 x 10  GTB 2 x 10  GTB 2 x 10    Side Step  RTB  4 Laps  nv  4 laps RTB             RTB nv                                                         Modalities  1/22  1/25  1/29  12/20 1/2  1/4  1/6    1/11  1/15    HP prn to LS supine  10 min  10 min  10 min  10 min  10 min  10 min   10 min    10 min prone  10 min prone

## 2019-01-29 NOTE — PROGRESS NOTES
Daily Note     Today's date: 2019  Patient name: Laron Coker  : 1955  MRN: [id number]  Referring provider: Naomie Ballard MD  Dx:   Encounter Diagnosis     ICD-10-CM    1  Closed extraarticular fracture of distal end of left radius with routine healing S52 552D                   Subjective: "It's been better for sure"      Objective: See treatment diary below  Assessment: Tolerated treatment well  Patient would benefit from continued OT  3/10 with flex/ext resisted, otherwise tolerating well  Plan: Continue per plan of care         Specialty Daily Treatment Diary     Manual  1/29 1/2 1/4 1/8 1/11 1/15 1/17 1/22 1/25   IASTM ECU 10' 5 12' 10' 10' 10' 10 10 10'   ECU stretching 3' 5 3' 3' 2' 3' 2  2'   KT ECU X x X X  applied x                                 Exercise Diary  12/20 1/2 1/4 1/8 1/11 1/15 1/17 1/22 1/29   HEP: ECU stretch, ECU isometrics, CFM Issued            Eccentric ECU  2# 2# 2# 3x10 3# 3x10  3# 3x10 3# 3x10 3# 3x10 3# 3x 10   Isometric sup/pro/flex/ext  GPB   GFB 2x10 GFB 2x10 GFB f/e/s/p 3x10 GFB all planes 3x10 GFB All planes   Wrist maze   10x 10x        Extension web   30x  Yellow 30x Yellow 3x10  Yellow 3x10 Y 3x10 y 3x 10 Y 3x10   Keypegs   1x 1x 1x  1x 1x   1x    Green ball on wall circles     3x10 3x10 3x10  3x 10                                                                                                                                                                   Modalities 12/20 1/2 1/4 1/8 1/11 1/15 1/17 1/22  1/25   MHP 10'  10' 10' 3' 10' 15 15 10'

## 2019-02-01 ENCOUNTER — OFFICE VISIT (OUTPATIENT)
Dept: PHYSICAL THERAPY | Facility: CLINIC | Age: 64
End: 2019-02-01
Payer: MEDICARE

## 2019-02-01 ENCOUNTER — APPOINTMENT (OUTPATIENT)
Dept: OCCUPATIONAL THERAPY | Facility: CLINIC | Age: 64
End: 2019-02-01
Payer: MEDICARE

## 2019-02-01 DIAGNOSIS — M54.50 CHRONIC BILATERAL LOW BACK PAIN WITHOUT SCIATICA: ICD-10-CM

## 2019-02-01 DIAGNOSIS — S32.020D CLOSED COMPRESSION FRACTURE OF L2 LUMBAR VERTEBRA WITH ROUTINE HEALING, SUBSEQUENT ENCOUNTER: Primary | ICD-10-CM

## 2019-02-01 DIAGNOSIS — G89.29 CHRONIC BILATERAL LOW BACK PAIN WITHOUT SCIATICA: ICD-10-CM

## 2019-02-01 PROCEDURE — 97110 THERAPEUTIC EXERCISES: CPT | Performed by: PHYSICAL THERAPIST

## 2019-02-01 PROCEDURE — 97140 MANUAL THERAPY 1/> REGIONS: CPT | Performed by: PHYSICAL THERAPIST

## 2019-02-01 PROCEDURE — 97112 NEUROMUSCULAR REEDUCATION: CPT | Performed by: PHYSICAL THERAPIST

## 2019-02-01 NOTE — PROGRESS NOTES
Daily Note     Today's date: 2019  Patient name: Fabiana Moran  : 1955  MRN: [id number]  Referring provider: WILMAN Gaitan*  Dx:   Encounter Diagnosis     ICD-10-CM    1  Closed compression fracture of L2 lumbar vertebra with routine healing, subsequent encounter S32 020D    2  Chronic bilateral low back pain without sciatica M54 5     G89 29                   Subjective: Pt presents today stating that she feels as though her walking tolerance is improving as well as standing and that generalized LS endurance that she has been concerned about for a while  Objective: See treatment diary below      Assessment:  Session needed to be shortened as pt needed to leave early for work  Focused predominantly on strengthening activities  Plan: Continue per plan of care  Precautions: HTN; Osteoporosis, Raynaud's No CP, Fall hx     Daily Treatment Diary      Manual   2/1  1/2  1/4  1/8  1/11  1/15  1/18   Prone LS L1-5 mobs PA grade 2-3 6 min  6 min  6min  6  6 min  6 - TS  8JH  6  6 min  6 min    Prone Quad ST  4 min  4 min  4 min  4  4 min  4 - TS    4  4 min  4 min                                                                                 Exercise Diary    21  1/2  1/4  1/8  1/11  1/15  1/18   Treadmill  7 min  7 min  7 mins 7 min  6 mins  6  8  7 min  7 min  7 min                         S/DKTC  10"x10  10" x 10" 10"x10  nv  10" x 10  :10-x10   10"x10  10" x 10  10" x 10  10" x10   Seated lumbar flexion  (pball)  Pball 6" x 15  Pball 6" x 15  Pball 6" x 10  nv  Pball 6"  X 15  Pball6" x 15   pball 6"x15  pball 6' x 15  Pbal  Pball 6" x 15                                                            TA w/ marching  2 x 10  2x10  2x10  nv  2 x 10 2x10  2x10  2 x 10  2 x 10  2 x 10                       clamshells  GTB  GTB 2x10 ea   GTB 2 x 10  GTB 2 x 10  20x RTB x 20   GTB 20x ea  GTB 20x  GTB 20x  GTB 20x                        Sit to stand  2 x 10  nv 2 x 10    nv             Mini Squat  2 x 10 2 x 10  2 x 10 2 x 10      2 x 10 2 x 10                           3 way hip B  2 x 10 RTB  NV RTB 2 x 10 RTB 2 x 10        2 x 10  2 x 10  2 x 10    Tband Row + Ext  GTB 2 x 10  GTB 2 x 10  GTB 2 x 10  GTB 2 x 10   RTB x 20   RTB 20x  GTB 2 x 10  GTB 2 x 10  GTB 2 x 10    Tband Multi Rot  GTB 2 x 10  GTB 2 x 10  GTB 2 x 10  GTB 2 x 10   RTB  RTB  GTB 2 x 10  GTB 2 x 10  GTB 2 x 10    Side Step  RTB  4 Laps  nv  4 laps RTB  4 laps           RTB nv                                                         Modalities  1/22  1/25  1/29 2/1 1/2  1/4  1/6    1/11  1/15    HP prn to LS supine  10 min  10 min  10 min  10 min  10 min  10 min   10 min    10 min prone  10 min prone

## 2019-02-02 DIAGNOSIS — S32.020D CLOSED COMPRESSION FRACTURE OF L2 LUMBAR VERTEBRA WITH ROUTINE HEALING, SUBSEQUENT ENCOUNTER: ICD-10-CM

## 2019-02-02 RX ORDER — OXYCODONE HYDROCHLORIDE 10 MG/1
TABLET ORAL
Qty: 60 TABLET | Refills: 0 | Status: CANCELLED | OUTPATIENT
Start: 2019-02-02

## 2019-02-04 ENCOUNTER — OFFICE VISIT (OUTPATIENT)
Dept: OBGYN CLINIC | Facility: CLINIC | Age: 64
End: 2019-02-04
Payer: MEDICARE

## 2019-02-04 VITALS
HEIGHT: 66 IN | SYSTOLIC BLOOD PRESSURE: 144 MMHG | BODY MASS INDEX: 19.93 KG/M2 | HEART RATE: 71 BPM | DIASTOLIC BLOOD PRESSURE: 81 MMHG | WEIGHT: 124 LBS

## 2019-02-04 DIAGNOSIS — M77.8 LEFT WRIST TENDONITIS: ICD-10-CM

## 2019-02-04 DIAGNOSIS — S32.020D CLOSED COMPRESSION FRACTURE OF L2 LUMBAR VERTEBRA WITH ROUTINE HEALING, SUBSEQUENT ENCOUNTER: ICD-10-CM

## 2019-02-04 DIAGNOSIS — S52.552D CLOSED EXTRAARTICULAR FRACTURE OF DISTAL END OF LEFT RADIUS WITH ROUTINE HEALING: Primary | ICD-10-CM

## 2019-02-04 PROCEDURE — 99213 OFFICE O/P EST LOW 20 MIN: CPT | Performed by: SURGERY

## 2019-02-04 RX ORDER — OXYCODONE HYDROCHLORIDE 10 MG/1
TABLET ORAL
Qty: 60 TABLET | Refills: 0 | Status: SHIPPED | OUTPATIENT
Start: 2019-02-04 | End: 2019-03-18 | Stop reason: SDUPTHER

## 2019-02-04 NOTE — PROGRESS NOTES
ASSESSMENT/PLAN:      70 y o  female with a healed left nondisplaced extra-articular radius fracture and ulnar sided left wrist pain, ECU tendonitis  [facility] will continue OT for ECU tendonitis  We will see her back in the office in 6 weeks time  A CSI can be done at that time, if she has not seen significant improvement in her symptoms  The patient verbalized understanding of exam findings and treatment plan  We engaged in the shared decision-making process and treatment options were discussed at length with the patient  Surgical and conservative management discussed today along with risks and benefits  Diagnoses and all orders for this visit:    Closed extraarticular fracture of distal end of left radius with routine healing  -     Ambulatory referral to PT/OT hand therapy; Future    Left wrist tendonitis  -     Ambulatory referral to PT/OT hand therapy; Future        Follow Up:  Return in about 6 weeks (around 3/18/2019)  To Do Next Visit:  Re-evaluation of current issue    ____________________________________________________________________________________________________________________________________________      CHIEF COMPLAINT:  Chief Complaint   Patient presents with    Left Wrist - Follow-up       SUBJECTIVE:  Laron Coker is a 70y o  year old RHD female who presents to the office today for follow up regarding a healed left nondisplaced extra-articular radius fracture that occurred on 09/02/18 and ECU tendonitis  [facility] has been attending OT  She notes that her symptoms have improved aprox  40 percent  She notes some difficulty with twisting her left wrist, which causes pain to the ulnar aspect of her left wrist  She denies associated numbness and tingling  I have personally reviewed all the relevant PMH, PSH, SH, FH, Medications and allergies       PAST MEDICAL HISTORY:  Past Medical History:   Diagnosis Date    Hypertension        PAST SURGICAL HISTORY:  Past Surgical History: Procedure Laterality Date    BREAST BIOPSY Right     stereotactic BX Benign       FAMILY HISTORY:  Family History   Problem Relation Age of Onset    Coronary artery disease Mother         early   Earna Dianelys Coronary artery disease Father         early       SOCIAL HISTORY:  Social History   Substance Use Topics    Smoking status: Never Smoker    Smokeless tobacco: Never Used      Comment: former smoker per [employer]scripts,pack a day for about 20 years, quit 10 years ago    Alcohol use 1 2 oz/week     2 Glasses of wine per week      Comment: daily       MEDICATIONS:    Current Outpatient Prescriptions:     acetaminophen (TYLENOL) 325 mg tablet, Take 2 tablets (650 mg total) by mouth every 6 (six) hours as needed for mild pain (Patient taking differently: Take 650 mg by mouth as needed for mild pain  ), Disp: 30 tablet, Rfl: 0    alendronate (FOSAMAX) 70 mg tablet, Take 1 tablet (70 mg total) by mouth every 7 days, Disp: 4 tablet, Rfl: 0    aspirin 81 MG tablet, Take 81 mg by mouth daily, Disp: , Rfl:     bimatoprost (LATISSE) 0 03 % ophthalmic solution, Place one drop on applicator and apply evenly along the skin of the upper eyelid at base of eyelashes once daily at bedtime; repeat procedure for second eye (use a clean applicator)  , Disp: 5 mL, Rfl: 0    calcium carbonate (OS-FELICIA) 600 MG tablet, Take 1,000 mg by mouth daily, Disp: , Rfl:     lisinopril (ZESTRIL) 20 mg tablet, Take 1 tablet (20 mg total) by mouth daily, Disp: 90 tablet, Rfl: 0    metoprolol tartrate (LOPRESSOR) 25 mg tablet, TAKE ONE TABLET BY MOUTH EVERY 12 HOURS, Disp: 180 tablet, Rfl: 0    Multiple Vitamins-Minerals (MULTIVITAMIN WITH MINERALS) tablet, Take 1 tablet by mouth daily, Disp: , Rfl:     oxyCODONE (ROXICODONE) 10 MG TABS, Take 1-2 tabs every 4 hours as needed, Disp: 60 tablet, Rfl: 0    polyethylene glycol (MIRALAX) 17 g packet, Take 17 g by mouth daily (Patient taking differently: Take 17 g by mouth as needed  ), Disp: 30 each, Rfl: 0    methocarbamol (ROBAXIN) 750 mg tablet, Take 1 tablet (750 mg total) by mouth every 6 (six) hours as needed for muscle spasms for up to 90 days, Disp: 270 tablet, Rfl: 0    ALLERGIES:  Allergies   Allergen Reactions    Penicillins        REVIEW OF SYSTEMS:  Review of Systems   Constitutional: Negative for chills, fever and unexpected weight change  HENT: Negative for hearing loss, nosebleeds and sore throat  Eyes: Negative for pain, redness and visual disturbance  Respiratory: Negative for cough, shortness of breath and wheezing  Cardiovascular: Negative for chest pain, palpitations and leg swelling  Gastrointestinal: Negative for abdominal pain, nausea and vomiting  Endocrine: Negative for polydipsia and polyuria  Genitourinary: Negative for difficulty urinating and hematuria  Musculoskeletal: Negative for arthralgias, joint swelling and myalgias  Skin: Negative for rash and wound  Neurological: Negative for dizziness, numbness and headaches  Psychiatric/Behavioral: Negative for decreased concentration, dysphoric mood and suicidal ideas  The patient is not nervous/anxious          VITALS:  Vitals:    02/04/19 1055   BP: 144/81   Pulse: 71       LABS:  HgA1c:   Lab Results   Component Value Date    HGBA1C 5 2 10/22/2016     BMP:   Lab Results   Component Value Date    GLUCOSE 130 10/21/2016    CALCIUM 9 4 09/02/2018     11/21/2016    K 4 4 09/02/2018    CO2 27 09/02/2018     09/02/2018    BUN 12 09/02/2018    CREATININE 0 76 09/02/2018       _____________________________________________________  PHYSICAL EXAMINATION:  General: well developed and well nourished, alert, oriented times 3 and appears comfortable  Psychiatric: Normal  HEENT: Normocephalic, Atraumatic Trachea Midline, No torticollis  Pulmonary: No audible wheezing or respiratory distress   Cardiovascular: No pitting edema, 2+ radial pulse   Skin: No masses, erthema, lacerations, fluctation, ulcerations  Neurovascular: Sensation Intact to the Median, Ulnar, Radial Nerve, Motor Intact to the Median, Ulnar, Radial Nerve and Pulses Intact  Musculoskeletal: Normal, except as noted in detailed exam and in HPI  MUSCULOSKELETAL EXAMINATION:    Left wrist:     1st Dorsal Compartment non TTP, DRUJ non TTP, TFCC  non TTP,  ECU non TTP, FCU non TTP, FCR non TTP, CMC Joint non TTP, lyster's tubercle non TTP but is prominet, Fracture site non TTP, scaphoid tubercle non TTP, pisiform non TTP,  fovea non TTP  Range of motion of the left wrist  is 65 flexion, 65 degrees extension, full pronation, 65 supination, non painful at terminal    There is no swelling present     There is no ecchymosis noted     Pulses are present and capillary fill is normal       Rodriguez Test is negative for pain and negative for clunk left wrist      Radial Carpal Impaction negative  Ulnar Carpal Impaction negative    ___________________________________________________  STUDIES REVIEWED:  No new imaging to obtain           PROCEDURES PERFORMED:  Procedures  No Procedures performed today    _____________________________________________________      Elverna Halsted    I,:   Renata Castañeda am acting as a scribe while in the presence of the attending physician :        I,:   Adrianna Peguero MD personally performed the services described in this documentation    as scribed in my presence :

## 2019-02-06 ENCOUNTER — OFFICE VISIT (OUTPATIENT)
Dept: PHYSICAL THERAPY | Facility: CLINIC | Age: 64
End: 2019-02-06
Payer: MEDICARE

## 2019-02-06 ENCOUNTER — OFFICE VISIT (OUTPATIENT)
Dept: OCCUPATIONAL THERAPY | Facility: CLINIC | Age: 64
End: 2019-02-06
Payer: MEDICARE

## 2019-02-06 DIAGNOSIS — S32.020D CLOSED COMPRESSION FRACTURE OF L2 LUMBAR VERTEBRA WITH ROUTINE HEALING, SUBSEQUENT ENCOUNTER: Primary | ICD-10-CM

## 2019-02-06 DIAGNOSIS — S52.552D CLOSED EXTRAARTICULAR FRACTURE OF DISTAL END OF LEFT RADIUS WITH ROUTINE HEALING: Primary | ICD-10-CM

## 2019-02-06 DIAGNOSIS — M54.50 CHRONIC BILATERAL LOW BACK PAIN WITHOUT SCIATICA: ICD-10-CM

## 2019-02-06 DIAGNOSIS — M25.532 PAIN IN LEFT WRIST: ICD-10-CM

## 2019-02-06 DIAGNOSIS — G89.29 CHRONIC BILATERAL LOW BACK PAIN WITHOUT SCIATICA: ICD-10-CM

## 2019-02-06 PROCEDURE — 97112 NEUROMUSCULAR REEDUCATION: CPT | Performed by: PHYSICAL THERAPIST

## 2019-02-06 PROCEDURE — 97110 THERAPEUTIC EXERCISES: CPT | Performed by: PHYSICAL THERAPIST

## 2019-02-06 PROCEDURE — 97140 MANUAL THERAPY 1/> REGIONS: CPT | Performed by: OCCUPATIONAL THERAPIST

## 2019-02-06 PROCEDURE — 97140 MANUAL THERAPY 1/> REGIONS: CPT | Performed by: PHYSICAL THERAPIST

## 2019-02-06 PROCEDURE — 97110 THERAPEUTIC EXERCISES: CPT | Performed by: OCCUPATIONAL THERAPIST

## 2019-02-06 NOTE — PROGRESS NOTES
Daily Note     Today's date: 2019  Patient name: Chrissy Magaña  : 1955  MRN: [id number]  Referring provider: WILMAN Lew*  Dx:   Encounter Diagnosis     ICD-10-CM    1  Closed compression fracture of L2 lumbar vertebra with routine healing, subsequent encounter S32 020D    2  Chronic bilateral low back pain without sciatica M54 5     G89 29                   Subjective: Pt reports that she is tired but feeling well  No sharp pains just achiness after 6-8hours of stand time, denies performing stomach tightening and seated flexion for relief  Objective: See treatment diary below      Assessment:  Able to enhance resistance on bands today without symptom exacerbation  Consider progression of reps n v  As able  Plan: Continue per plan of care  Precautions: HTN; Osteoporosis, Raynaud's No CP, Fall hx     Daily Treatment Diary      Manual   2/1 2/6  1/4  1/8  1/11  1/15  1/18   Prone LS L1-5 mobs PA grade 2-3 6 min  6 min  6min  6  6 min  6 - TS  8JH  6  6 min  6 min    Prone Quad ST  4 min  4 min  4 min  4  4 min  4 - TS    4  4 min  4 min                                                                                 Exercise Diary    2/1 2/6  1/4  1/8  1/11  1/15  1/18   Treadmill  7 min  7 min  7 mins 7 min  7 mins  6  8  7 min  7 min  7 min                         S/DKTC  10"x10  10" x 10" 10"x10  nv  10" x 10  :10-x10   10"x10  10" x 10  10" x 10  10" x10   Seated lumbar flexion  (pball)  Pball 6" x 15  Pball 6" x 15  Pball 6" x 10  nv  Pball 6"  X 15  Pball6" x 15   pball 6"x15  pball 6' x 15  Pbal  Pball 6" x 15                                                                                             clamshells  GTB  GTB 2x10 ea   GTB 2 x 10  GTB 2 x 10  GTB 2x10 RTB x 20   GTB 20x ea  GTB 20x  GTB 20x  GTB 20x                        Sit to stand  2 x 10  nv 2 x 10                Mini Squat  2 x 10 2 x 10  2 x 10 2 x 10 2 x 10     2 x 10 2 x 10                        3 way hip B  2 x 10 RTB  NV RTB 2 x 10 RTB 2 x 10  RTB 2 x 10      2 x 10  2 x 10  2 x 10    Tband Row + Ext  GTB 2 x 10  GTB 2 x 10  GTB 2 x 10  GTB 2 x 10 Blue2 x 10 RTB x 20   RTB 20x  GTB 2 x 10  GTB 2 x 10  GTB 2 x 10    Tband Multi Rot  GTB 2 x 10  GTB 2 x 10  GTB 2 x 10  GTB 2 x 10  GTB 2 x 10 RTB  RTB  GTB 2 x 10  GTB 2 x 10  GTB 2 x 10    Side Step  RTB  4 Laps  nv  4 laps RTB  4 laps  4 laps         RTB nv                                                         Modalities  1/22  1/25  1/29 2/1 2/6  1/4  1/6    1/11  1/15    HP prn to LS supine  10 min  10 min  10 min  10 min np  10 min   10 min    10 min prone  10 min prone

## 2019-02-06 NOTE — PROGRESS NOTES
Daily Note     Today's date: 2019  Patient name: Rayne Gomez  : 1955  MRN: [id number]  Referring provider: Taj Foster MD  Dx:   Encounter Diagnosis     ICD-10-CM    1  Closed extraarticular fracture of distal end of left radius with routine healing S52 552D    2  Pain in left wrist M25 532                   Subjective: States 40% improvement  Objective: See treatment diary below  Assessment: Tolerated treatment well  Patient would benefit from continued OT  Plan: Continue per plan of care  Increase WE weight to 4#         Specialty Daily Treatment Diary     Manual  1/29 2/6 1/4 1/8 1/11 1/15 1/17 1/22 1/25   IASTM ECU 10' 10' 12' 10' 10' 10' 10 10 10'   ECU stretching 3'  3' 3' 2' 3' 2  2'   KT ECU X  X X  applied x                                 Exercise Diary  2/6 1/2 1/4 1/8 1/11 1/15 1/17 1/22 1/29   HEP: ECU stretch, ECU isometrics, CFM            Eccentric ECU 3# 3x10  2# 2# 2# 3x10 3# 3x10  3# 3x10 3# 3x10 3# 3x10 3# 3x 10   Isometric sup/pro/flex/ext GFB GPB   GFB 2x10 GFB 2x10 GFB f/e/s/p 3x10 GFB all planes 3x10 GFB All planes   Wrist maze   10x 10x        Extension web 3x10  30x  Yellow 30x Yellow 3x10  Yellow 3x10 Y 3x10 y 3x 10 Y 3x10   Keypegs   1x 1x 1x  1x 1x   1x    Green ball on wall circles 3x10    3x10 3x10 3x10  3x 10                                                                                                                                                                   Modalities 2/6 1/2 1/4 1/8 1/11 1/15 1/17 1/22  1/25   MHP 10'  10' 10' 3' 10' 15 15 10'

## 2019-02-08 ENCOUNTER — EVALUATION (OUTPATIENT)
Dept: OCCUPATIONAL THERAPY | Facility: CLINIC | Age: 64
End: 2019-02-08
Payer: MEDICARE

## 2019-02-08 ENCOUNTER — OFFICE VISIT (OUTPATIENT)
Dept: PHYSICAL THERAPY | Facility: CLINIC | Age: 64
End: 2019-02-08
Payer: MEDICARE

## 2019-02-08 DIAGNOSIS — M54.50 CHRONIC BILATERAL LOW BACK PAIN WITHOUT SCIATICA: ICD-10-CM

## 2019-02-08 DIAGNOSIS — S32.020D CLOSED COMPRESSION FRACTURE OF L2 LUMBAR VERTEBRA WITH ROUTINE HEALING, SUBSEQUENT ENCOUNTER: Primary | ICD-10-CM

## 2019-02-08 DIAGNOSIS — M25.532 PAIN IN LEFT WRIST: ICD-10-CM

## 2019-02-08 DIAGNOSIS — S52.552D CLOSED EXTRAARTICULAR FRACTURE OF DISTAL END OF LEFT RADIUS WITH ROUTINE HEALING: Primary | ICD-10-CM

## 2019-02-08 DIAGNOSIS — G89.29 CHRONIC BILATERAL LOW BACK PAIN WITHOUT SCIATICA: ICD-10-CM

## 2019-02-08 PROCEDURE — 97140 MANUAL THERAPY 1/> REGIONS: CPT | Performed by: PHYSICAL THERAPIST

## 2019-02-08 PROCEDURE — 97140 MANUAL THERAPY 1/> REGIONS: CPT | Performed by: OCCUPATIONAL THERAPIST

## 2019-02-08 PROCEDURE — 97110 THERAPEUTIC EXERCISES: CPT | Performed by: PHYSICAL THERAPIST

## 2019-02-08 PROCEDURE — 97110 THERAPEUTIC EXERCISES: CPT | Performed by: OCCUPATIONAL THERAPIST

## 2019-02-08 NOTE — PROGRESS NOTES
OT Re-Evaluation     Today's date: 2019  Patient name: Edwin Yeboah  : 1955  MRN: [id number]  Referring provider: Cynthia Givens MD  Dx:   Encounter Diagnosis     ICD-10-CM    1  Closed extraarticular fracture of distal end of left radius with routine healing S52 552D    2  Pain in left wrist M25 532                   Assessment  Assessment details: Presents with L ECU irritation s/p L distal radius fracture  She demonstrates improved pain and tenderness since beginning therapy   strength is also greatly improved and approaching the strength in the contralateral hand  The ECU is less tender and also less tight than the initial evaluation  She will benefit from continued therapy and soft tissue mobilization as well as strengthening to return to leisure activities  See below for a detailed assessment  Impairments: abnormal or restricted ROM, activity intolerance, impaired physical strength, pain with function and weight-bearing intolerance    Symptom irritability: moderateUnderstanding of Dx/Px/POC: good   Prognosis: good    Goals  STG: Patient will be compliant with home exercise program in 2 weeks-MET  STG: Pain will be reduced by 25% in 4 weeks  - PARTIALLY MET  STG: Strength will be improved by 25% in 4 weeks  - MET    LTG: Strength will be improved by 50% in 6-8 weeks  - MET  LTG: Performance in ADLs and IADLS will be improved to prior level of function with the affected extremity within 6 weeks  - PARTIALLY MET  LTG: Performance in work/school activity will be improved to prior level of function with the affected extremity within 6 weeks  - PARTIALLY MET  LTG: FOTO score increase by 20 points within 6 weeks  - PARTIALLY MET    NEW GOAL:  Able to tolerate weight bearing through wrist in order to perform yoga positions in 3 weeks  Plan  Plan details: Treatment to include modalities, manual therapy, PRE's, HEP, and orthotics as appropriate     Patient would benefit from: skilled OT, OT martha and custom splinting  Planned modality interventions: thermotherapy: hydrocollator packs  Planned therapy interventions: home exercise program, manual therapy, therapeutic exercise, massage, Pedroza taping, therapeutic activities, strengthening and stretching  Frequency: 2x week  Duration in weeks: 6  Treatment plan discussed with: patient        Subjective Evaluation    History of Present Illness  Date of surgery: 2018  Mechanism of injury: trauma  Mechanism of injury: Loreto is s/p a L distal radius fracture in September, now presenting with residual ulnar sided wrist pain  Pain  At best pain ratin  At worst pain ratin (Intermitent pain to a 6/7 )  Quality: sharp and discomfort    Social Support    Employment status: working ([employer])  Hand dominance: right    Treatments  Current treatment: occupational therapy  Patient Goals  Patient goals for therapy: decreased pain, increased motion, increased strength and independence with ADLs/IADLs          Objective     Active Range of Motion     Left Elbow   Forearm supination: 70 degrees   Forearm pronation: 45 degrees     Left Wrist   Wrist flexion: 60 degrees   Wrist extension: 69 degrees   Radial deviation: 8 degrees   Ulnar deviation: 27 degrees     Right Wrist   Wrist flexion: 62 degrees   Wrist extension: 70 degrees   Radial deviation: 10 degrees   Ulnar deviation: 35 degrees     Additional Active Range of Motion Details  Wrist flexion in supination=50     Strength/Myotome Testing     Left Wrist/Hand      (2nd hand position)     Trial 1: 62 3    Right Wrist/Hand      (2nd hand position)     Trial 1: 67 9    Additional Strength Details  +GRIT testing (mild)     Tests     Left Wrist/Hand   Negative TFCC load       Additional Tests Details  +ECU synergy test (4/10)   -piano key  RROM ECU (2/10)  +table top lift test    Swelling     Left Wrist/Hand   Circumference wrist: 15 9 cm    Right Wrist/Hand   Circumference wrist: 15 5 cm

## 2019-02-08 NOTE — PROGRESS NOTES
Daily Note     Today's date: 2019  Patient name: Chrissy Magaña  : 1955  MRN: [id number]  Referring provider: WILMAN Lew*  Dx:   Encounter Diagnosis     ICD-10-CM    1  Closed compression fracture of L2 lumbar vertebra with routine healing, subsequent encounter S32 020D    2  Chronic bilateral low back pain without sciatica M54 5     G89 29                   Subjective: Pt presents today stating that she is feeling well, continues to notice she is getting better and only having pain really at end of day  Objective: See treatment diary below      Assessment:  Continues to demonstrate improved ability to engage trunk stability with performance of her tasks at hand  Plan: Continue per plan of care  Precautions: HTN; Osteoporosis, Raynaud's No CP, Fall hx     Daily Treatment Diary      Manual   2/6 2/8  1/8  1/11  1/15  1/18   Prone LS L1-5 mobs PA grade 2-3 6 min  6 min  6min  6  6 min  6 - TS  8JH  6  6 min  6 min    Prone Quad ST  4 min  4 min  4 min  4  4 min  4 - TS    4  4 min  4 min                                                                                 Exercise Diary    2/1 2/6 2/8  1/8  1/11  1/15  1/18   Treadmill  7 min  7 min  7 mins 7 min  7 mins  6  8  7 min  7 min  7 min                         S/DKTC  10"x10  10" x 10" 10"x10  nv  10" x 10  :10-x10   10"x10  10" x 10  10" x 10  10" x10   Seated lumbar flexion  (pball)  Pball 6" x 15  Pball 6" x 15  Pball 6" x 10  nv  Pball 6"  X 15  Pball6" x 15   pball 6"x15  pball 6' x 15  Pbal  Pball 6" x 15                                                                                             clamshells  GTB  GTB 2x10 ea   GTB 2 x 10  GTB 2 x 10  GTB 2x10 RTB x 20   GTB 20x ea  GTB 20x  GTB 20x  GTB 20x                        Sit to stand  2 x 10  nv 2 x 10                Mini Squat  2 x 10 2 x 10  2 x 10 2 x 10 2 x 10 2 x 10    2 x 10 2 x 10                           3 way hip B  2 x 10 RTB  NV RTB 2 x 10 RTB 2 x 10  RTB 2 x 10  RTB    2 x 10  2 x 10  2 x 10    Tband Row + Ext  GTB 2 x 10  GTB 2 x 10  GTB 2 x 10  GTB 2 x 10 Blue2 x 10 BluTB x 20   RTB 20x  GTB 2 x 10  GTB 2 x 10  GTB 2 x 10    Tband Multi Rot  GTB 2 x 10  GTB 2 x 10  GTB 2 x 10  GTB 2 x 10  GTB 2 x 10 GTB  RTB  GTB 2 x 10  GTB 2 x 10  GTB 2 x 10    Side Step  RTB  4 Laps  nv  4 laps RTB  4 laps  4 laps  4 laps RTB       RTB nv                                                         Modalities  1/22 1/25 1/29 2/1 2/6 2/8  1/6    1/11  1/15    HP prn to LS supine  10 min  10 min  10 min  10 min np  10 min   10 min    10 min prone  10 min prone

## 2019-02-08 NOTE — PROGRESS NOTES
Daily Note     Today's date: 2019  Patient name: Alok Garrison  : 1955  MRN: [id number]  Referring provider: Abby Saeed MD  Dx:   Encounter Diagnosis     ICD-10-CM    1  Closed extraarticular fracture of distal end of left radius with routine healing S52 552D    2  Pain in left wrist M25 532                   Subjective: States that soft tissue  Mobilization helps pain  Objective: See treatment diary below and attached re-eval        Assessment: There is some ECU tightness present, but much improved from initial evaluation  Plan: Continue per plan of care  Add increased weight bearing exercises to progress to planks         Specialty Daily Treatment Diary     Manual  1/29 2/6 2/8 1/8 1/11 1/15 1/17 1/22 1/25   IASTM ECU 10' 10' 12' 10' 10' 10' 10 10 10'   ECU stretching 3'  3' 3' 2' 3' 2  2'   KT ECU X   X  applied x                                 Exercise Diary  2/6 2/8 1/4 1/8 1/11 1/15 1/17 1/22 1/29   HEP: ECU stretch, ECU isometrics, CFM            Eccentric ECU 3# 3x10  4# 3x10  2# 2# 3x10 3# 3x10  3# 3x10 3# 3x10 3# 3x10 3# 3x 10   Isometric sup/pro/flex/ext GFB    GFB 2x10 GFB 2x10 GFB f/e/s/p 3x10 GFB all planes 3x10 GFB All planes   Wrist maze   10x 10x        Extension web 3x10  30x  Yellow 30x Yellow 3x10  Yellow 3x10 Y 3x10 y 3x 10 Y 3x10   Keypegs   1x 1x 1x  1x 1x   1x    Green ball on wall circles 3x10    3x10 3x10 3x10  3x 10   WB on wall, side planks PNF  2x15                                                                                                                                                              Modalities 2/6 2/8 1/4 1/8 1/11 1/15 1/17 1/22  1/25   MHP 10' 10' 10' 10' 3' 10' 15 15 10'

## 2019-02-12 ENCOUNTER — APPOINTMENT (OUTPATIENT)
Dept: OCCUPATIONAL THERAPY | Facility: CLINIC | Age: 64
End: 2019-02-12
Payer: MEDICARE

## 2019-02-12 ENCOUNTER — APPOINTMENT (OUTPATIENT)
Dept: PHYSICAL THERAPY | Facility: CLINIC | Age: 64
End: 2019-02-12
Payer: MEDICARE

## 2019-02-15 ENCOUNTER — APPOINTMENT (OUTPATIENT)
Dept: OCCUPATIONAL THERAPY | Facility: CLINIC | Age: 64
End: 2019-02-15
Payer: MEDICARE

## 2019-02-15 ENCOUNTER — EVALUATION (OUTPATIENT)
Dept: PHYSICAL THERAPY | Facility: CLINIC | Age: 64
End: 2019-02-15
Payer: MEDICARE

## 2019-02-15 DIAGNOSIS — M54.50 CHRONIC BILATERAL LOW BACK PAIN WITHOUT SCIATICA: ICD-10-CM

## 2019-02-15 DIAGNOSIS — G89.29 CHRONIC BILATERAL LOW BACK PAIN WITHOUT SCIATICA: ICD-10-CM

## 2019-02-15 DIAGNOSIS — S32.020D CLOSED COMPRESSION FRACTURE OF L2 LUMBAR VERTEBRA WITH ROUTINE HEALING, SUBSEQUENT ENCOUNTER: Primary | ICD-10-CM

## 2019-02-15 PROCEDURE — 97140 MANUAL THERAPY 1/> REGIONS: CPT | Performed by: PHYSICAL THERAPIST

## 2019-02-15 PROCEDURE — 97110 THERAPEUTIC EXERCISES: CPT | Performed by: PHYSICAL THERAPIST

## 2019-02-15 PROCEDURE — 97112 NEUROMUSCULAR REEDUCATION: CPT | Performed by: PHYSICAL THERAPIST

## 2019-02-15 NOTE — PROGRESS NOTES
PT Re-Evaluation     Today's date: 2/15/2019  Patient name: Estephania Maynard  : 1955  MRN: [id number]  Referring provider: WILMAN Wilkes*  Dx:   Encounter Diagnosis     ICD-10-CM    1  Closed compression fracture of L2 lumbar vertebra with routine healing, subsequent encounter S32 020D    2  Chronic bilateral low back pain without sciatica M54 5     G89 29                   Assessment  Assessment details: Pt continues to make gains in ROM, strength and tolerance to activities  At this time she will benefit continued skilled PT intervention as her largest restrictions are endurance which is quite good given her age and time out from injury  Pt is demonstrating strong knowledge of her HEP and is likely safe to step visits down to 1 a week for further encouragement of joint mobility in order to hopefully lengthen time of endurance and pain level intensity    Thank you very much for this kind and motivated referral       Impairments: abnormal or restricted ROM, activity intolerance, impaired physical strength, pain with function and poor posture   Understanding of Dx/Px/POC: good   Prognosis: good    Goals  STG 4 Weeks:  Decrease pain at worst to 5/10 -met  Improve range to SLR to 60, Ely 105 -met  Improve strength to TA draw 20", hip to 4-  -met  Independent with HEP -met  LTG 8 Weeks:  Decrease pain at worst to 2/10 -partial  Improve range to SLR 65, Ely 105 -met  Improve strength to 30" TA Draw, hip 4/5 or greater   -partial  Able to perform all desired activities with minimal to nil symptom exacerbation      Plan  Patient would benefit from: skilled physical therapy  Planned modality interventions: cryotherapy and thermotherapy: hydrocollator packs  Planned therapy interventions: joint mobilization, manual therapy, abdominal trunk stabilization, patient education, postural training, stretching, strengthening, therapeutic exercise, therapeutic training, home exercise program, graded motor, graded exercise, graded activity, gait training, functional ROM exercises and flexibility  Frequency: 1x week  Duration in weeks: 8  Treatment plan discussed with: patient        Subjective Evaluation    History of Present Illness  Date of onset: 2018  Mechanism of injury: Pt presents today stating that pain at worst can still get up to about a 5/10 however her standing and walking endurance has continued to improve up to about 6-8hours before her symptoms will get there  Otherwise she is able to perform 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  Pt reports she still uses medication but continues to try to decrease her intake  Pt reports that her largest goals are to be able to continue enhancing her endurance and decrease pain at worst moments if possible  She does not have a scheduled follow up with her physician     Pain  Current pain ratin  At best pain ratin  At worst pain ratin  Quality: dull ache, throbbing, pressure and sharp  Relieving factors: change in position  Aggravating factors: standing and walking  Progression: improved      Diagnostic Tests  X-ray: abnormal (Fx L2 and L wrist  )  Treatments  Previous treatment: physical therapy  Current treatment: medication, physical therapy and occupational therapy  Patient Goals  Patient goals for therapy: decreased pain, increased motion, increased strength, return to sport/leisure activities and return to work          Objective     Active Range of Motion     Lumbar   Flexion: 75 degrees  with pain  Extension: 15 degrees  with pain  Left lateral flexion: 25 degrees       Right lateral flexion: 25 degrees   Left rotation: 100 degrees   Right rotation: 100 degrees     Additional Active Range of Motion Details  Forward head, rounded shoulders, decreased Lordosis  B/L Sensation intact to L3,4,5,S1,S2  DTR Patellar B/L 1+   Repeated motion   Flex stiffness no radicular  Ext stiffness no radicular  SB R stiffness no radicular  SB L stiffness no radicular  Hip Strength  L Flex 5 Ext 4 Abd 4- Add 5  R Flex 5 Ext 5 Abd 4-  Add 5  LE Screen  Strong and pain less  Joint Mobility:  Grade 2 L1-2, Grade 3 L3-S1  Palpation: Pain along L1-2-3  STs  L SLR + 70, (-) Fadir, (-) Tamia, (-) Scour, (-) SI Distraction/Compression, + Distraction, + Ely 125  R  SLR + 70, (-) Fadir, (-) Tamia, (-) Scour, (-) SI Distraction/Compression, + Distraction, + Ely 125  TA Draw 30"            Precautions: HTN; Osteoporosis, Raynaud's No CP, Fall hx     Daily Treatment Diary      Manual  1/22 1/25 1/29 2/1 2/6 2/8 2/15  1/11  1/15  1/18   Prone LS L1-5 mobs PA grade 2-3 6 min  6 min  6min  6  6 min  6 - TS 10  6  6 min  6 min    Prone Quad ST  4 min  4 min  4 min  4  4 min  4 - TS  ful  4  4 min  4 min                                                                                 Exercise Diary  1/22 1/25 1/29  2/1 2/6 2/8 2/15  1/11  1/15  1/18   Treadmill  7 min  7 min  7 mins 7 min  7 mins  6  7  7 min  7 min  7 min                         S/DKTC  10"x10  10" x 10" 10"x10  nv  10" x 10  :10-x10   10"x10  10" x 10  10" x 10  10" x10   Seated lumbar flexion  (pball)  Pball 6" x 15  Pball 6" x 15  Pball 6" x 10  nv  Pball 6"  X 15  Pball6" x 15   pball 6"x15  pball 6' x 15  Pbal  Pball 6" x 15                                                                                             clamshells  GTB  GTB 2x10 ea   GTB 2 x 10  GTB 2 x 10  GTB 2x10 RTB x 20   GTB 20x ea  GTB 20x  GTB 20x  GTB 20x                        Sit to stand  2 x 10  nv 2 x 10                Mini Squat  2 x 10 2 x 10  2 x 10 2 x 10 2 x 10 2 x 10  2 x 10   2 x 10 2 x 10                           3 way hip B  2 x 10 RTB  NV RTB 2 x 10 RTB 2 x 10  RTB 2 x 10  RTB  GTB 2 x 10  2 x 10  2 x 10  2 x 10    Tband Row + Ext  GTB 2 x 10  GTB 2 x 10  GTB 2 x 10  GTB 2 x 10 Blue2 x 10 BluTB x 20   BTB 20x  GTB 2 x 10  GTB 2 x 10  GTB 2 x 10    Tband Multi Rot  GTB 2 x 10  GTB 2 x 10  GTB 2 x 10  GTB 2 x 10  GTB 2 x 10 GTB GTB 2 x 10  GTB 2 x 10  GTB 2 x 10  GTB 2 x 10    Side Step  RTB  4 Laps  nv  4 laps RTB  4 laps  4 laps  4 laps RTB  4 laps GTB     RTB nv                                                         Modalities  1/22 1/25 1/29 2/1 2/6 2/8 2/15    1/11  1/15    HP prn to LS supine  10 min  10 min  10 min  10 min np  10 min   10 min    10 min prone  10 min prone

## 2019-02-19 ENCOUNTER — APPOINTMENT (OUTPATIENT)
Dept: OCCUPATIONAL THERAPY | Facility: CLINIC | Age: 64
End: 2019-02-19
Payer: MEDICARE

## 2019-02-19 ENCOUNTER — APPOINTMENT (OUTPATIENT)
Dept: PHYSICAL THERAPY | Facility: CLINIC | Age: 64
End: 2019-02-19
Payer: MEDICARE

## 2019-02-21 ENCOUNTER — APPOINTMENT (OUTPATIENT)
Dept: PHYSICAL THERAPY | Facility: CLINIC | Age: 64
End: 2019-02-21
Payer: MEDICARE

## 2019-02-21 ENCOUNTER — APPOINTMENT (OUTPATIENT)
Dept: OCCUPATIONAL THERAPY | Facility: CLINIC | Age: 64
End: 2019-02-21
Payer: MEDICARE

## 2019-02-22 ENCOUNTER — OFFICE VISIT (OUTPATIENT)
Dept: OCCUPATIONAL THERAPY | Facility: CLINIC | Age: 64
End: 2019-02-22
Payer: MEDICARE

## 2019-02-22 ENCOUNTER — OFFICE VISIT (OUTPATIENT)
Dept: PHYSICAL THERAPY | Facility: CLINIC | Age: 64
End: 2019-02-22
Payer: MEDICARE

## 2019-02-22 DIAGNOSIS — M54.50 CHRONIC BILATERAL LOW BACK PAIN WITHOUT SCIATICA: ICD-10-CM

## 2019-02-22 DIAGNOSIS — G89.29 CHRONIC BILATERAL LOW BACK PAIN WITHOUT SCIATICA: ICD-10-CM

## 2019-02-22 DIAGNOSIS — S52.552D CLOSED EXTRAARTICULAR FRACTURE OF DISTAL END OF LEFT RADIUS WITH ROUTINE HEALING: Primary | ICD-10-CM

## 2019-02-22 DIAGNOSIS — S32.020D CLOSED COMPRESSION FRACTURE OF L2 LUMBAR VERTEBRA WITH ROUTINE HEALING, SUBSEQUENT ENCOUNTER: Primary | ICD-10-CM

## 2019-02-22 PROCEDURE — 97110 THERAPEUTIC EXERCISES: CPT | Performed by: OCCUPATIONAL THERAPIST

## 2019-02-22 PROCEDURE — 97110 THERAPEUTIC EXERCISES: CPT | Performed by: PHYSICAL THERAPIST

## 2019-02-22 PROCEDURE — 97140 MANUAL THERAPY 1/> REGIONS: CPT | Performed by: PHYSICAL THERAPIST

## 2019-02-22 PROCEDURE — 97140 MANUAL THERAPY 1/> REGIONS: CPT | Performed by: OCCUPATIONAL THERAPIST

## 2019-02-22 NOTE — PROGRESS NOTES
Daily Note     Today's date: 2019  Patient name: Dawson Camilo  : 1955  MRN: [id number]  Referring provider: Nereida Fofana MD  Dx:   Encounter Diagnosis     ICD-10-CM    1  Closed extraarticular fracture of distal end of left radius with routine healing S52 552D                   Subjective: "It's been good"      Objective: See treatment diary below  Assessment: 1x episode of snapping with ECU, otherwise tolerated tavares well  Plan: Continue per plan of care  Add increased weight bearing exercises to progress to planks         Specialty Daily Treatment Diary     Manual  1/29 2/6 2/8 2/22 1/11 1/15 1/17 1/22 1/25   IASTM ECU 10' 10' 12' 12 10' 10' 10 10 10'   ECU stretching 3'  3' 3' 2' 3' 2  2'   KT ECU X     applied x                                 Exercise Diary  2/6 2/8 2/22 1/8 1/11 1/15 1/17 1/22 1/29   HEP: ECU stretch, ECU isometrics, CFM            Eccentric ECU 3# 3x10  4# 3x10  4# 3x 15 2# 3x10 3# 3x10  3# 3x10 3# 3x10 3# 3x10 3# 3x 10   Isometric sup/pro/flex/ext GFB    GFB 2x10 GFB 2x10 GFB f/e/s/p 3x10 GFB all planes 3x10 GFB All planes   Wrist maze    10x        Extension web 3x10  30x  Yellow 30x Yellow 3x10  Yellow 3x10 Y 3x10 y 3x 10 Y 3x10   Keypegs   1x 1x 1x  1x 1x   1x    Green ball on wall circles 3x10    3x10 3x10 3x10  3x 10   WB on wall, side planks PNF  2x15                                                                                                                                                              Modalities 2/6 2/8 1/4 1/8 1/11 1/15 1/17 1/22  1/25   MHP 10' 10' 10' 10' 3' 10' 15 15 10'

## 2019-02-22 NOTE — PROGRESS NOTES
Daily Note     Today's date: 2019  Patient name: Harinder Day  : 1955  MRN: [id number]  Referring provider: WILMAN Kaye*  Dx:   Encounter Diagnosis     ICD-10-CM    1  Closed compression fracture of L2 lumbar vertebra with routine healing, subsequent encounter S32 020D    2  Chronic bilateral low back pain without sciatica M54 5     G89 29                   Subjective: Pt arrived 20 mins late to appointment  States she is doing well other than being busy  Objective: See treatment diary below      Assessment:  Session heavily modified due to pts presentation at 840  No pain t/o session  Plan: Continue per plan of care  Precautions: HTN; Osteoporosis, Raynaud's No CP, Fall hx     Daily Treatment Diary      Manual  1/22  1/25  1/29 2/1 2/6 2/8 2/15 2/22  1/15  1/18   Prone LS L1-5 mobs PA grade 2-3 6 min  6 min  6min  6  6 min  6 - TS 10  6  6 min  6 min    Prone Quad ST  4 min  4 min  4 min  4  4 min  4 - TS  ful  4  4 min  4 min                                                                                 Exercise Diary    2 2/6 2/8 2/15 2/22  1/15  1/18   Treadmill  7 min  7 min  7 mins 7 min  7 mins  6  7  7 min  7 min  7 min                         S/DKTC  10"x10  10" x 10" 10"x10  nv  10" x 10  :10-x10   10"x10  10" x 10  10" x 10  10" x10   Seated lumbar flexion  (pball)  Pball 6" x 15  Pball 6" x 15  Pball 6" x 10  nv  Pball 6"  X 15  Pball6" x 15   pball 6"x15  pball 6' x 15  Pbal  Pball 6" x 15                                                                                             clamshells  GTB  GTB 2x10 ea   GTB 2 x 10  GTB 2 x 10  GTB 2x10 RTB x 20   GTB 20x ea  GTB 20x  GTB 20x  GTB 20x                        Sit to stand  2 x 10  nv 2 x 10                Mini Squat  2 x 10 2 x 10  2 x 10 2 x 10 2 x 10 2 x 10  2 x 10 2 x 10  2 x 10 2 x 10                           3 way hip B  2 x 10 RTB  NV RTB 2 x 10 RTB 2 x 10  RTB 2 x 10  RTB  GTB 2 x 10  GTB 2 x 10  2 x 10  2 x 10    Tband Row + Ext  GTB 2 x 10  GTB 2 x 10  GTB 2 x 10  GTB 2 x 10 Blue2 x 10 BluTB x 20   BTB 20x BTB 2 x 10  GTB 2 x 10  GTB 2 x 10    Tband Multi Rot  GTB 2 x 10  GTB 2 x 10  GTB 2 x 10  GTB 2 x 10  GTB 2 x 10 GTB GTB 2 x 10  GTB 2 x 10  GTB 2 x 10  GTB 2 x 10    Side Step  RTB  4 Laps  nv  4 laps RTB  4 laps  4 laps  4 laps RTB  4 laps GTB  4 laps GTB   RTB nv                                                         Modalities  1/22 1/25 1/29 2/1 2/6 2/8 2/15  2/22  1/11  1/15    HP prn to LS supine  10 min  10 min  10 min  10 min np  10 min   10 min  HP at OT  10 min prone  10 min prone

## 2019-03-14 NOTE — PROGRESS NOTES
PT Discharge    Today's date: 3/14/2019  Patient name: Leila Mathews  : 1955  MRN: [id number]  Referring provider: WILMAN Webber*  Dx:   Encounter Diagnosis     ICD-10-CM    1  Closed compression fracture of L2 lumbar vertebra with routine healing, subsequent encounter S32 020D    2  Chronic bilateral low back pain without sciatica M54 5     G89 29        Start Time: 0840  Stop Time: 0910  Total time in clinic (min): 30 minutes    Assessment/Plan Pt has not been present since 3/22/19  Pt's chart will be DC in compliance of facility policy as all Charts are DC within 30 days of last scheduled visit          Subjective    Objective

## 2019-03-18 DIAGNOSIS — S32.020D CLOSED COMPRESSION FRACTURE OF L2 LUMBAR VERTEBRA WITH ROUTINE HEALING, SUBSEQUENT ENCOUNTER: ICD-10-CM

## 2019-03-18 RX ORDER — OXYCODONE HYDROCHLORIDE 10 MG/1
TABLET ORAL
Qty: 60 TABLET | Refills: 0 | Status: SHIPPED | OUTPATIENT
Start: 2019-03-18 | End: 2019-05-23 | Stop reason: SDUPTHER

## 2019-03-19 ENCOUNTER — OFFICE VISIT (OUTPATIENT)
Dept: FAMILY MEDICINE CLINIC | Facility: CLINIC | Age: 64
End: 2019-03-19
Payer: MEDICARE

## 2019-03-19 VITALS
DIASTOLIC BLOOD PRESSURE: 82 MMHG | HEIGHT: 66 IN | BODY MASS INDEX: 20.25 KG/M2 | RESPIRATION RATE: 18 BRPM | TEMPERATURE: 97.7 F | SYSTOLIC BLOOD PRESSURE: 126 MMHG | OXYGEN SATURATION: 98 % | HEART RATE: 62 BPM | WEIGHT: 126 LBS

## 2019-03-19 DIAGNOSIS — Z00.00 MEDICARE WELCOME VISIT: Primary | ICD-10-CM

## 2019-03-19 DIAGNOSIS — Z12.11 SCREENING FOR COLON CANCER: ICD-10-CM

## 2019-03-19 DIAGNOSIS — Z11.59 ENCOUNTER FOR HEPATITIS C SCREENING TEST FOR LOW RISK PATIENT: ICD-10-CM

## 2019-03-19 DIAGNOSIS — I10 BENIGN ESSENTIAL HYPERTENSION: ICD-10-CM

## 2019-03-19 DIAGNOSIS — S32.020G CLOSED COMPRESSION FRACTURE OF L2 LUMBAR VERTEBRA WITH DELAYED HEALING, SUBSEQUENT ENCOUNTER: ICD-10-CM

## 2019-03-19 DIAGNOSIS — B00.1 COLD SORE: ICD-10-CM

## 2019-03-19 DIAGNOSIS — Z12.11 SPECIAL SCREENING FOR MALIGNANT NEOPLASMS, COLON: ICD-10-CM

## 2019-03-19 DIAGNOSIS — Z23 ENCOUNTER FOR ADMINISTRATION OF VACCINE: ICD-10-CM

## 2019-03-19 PROCEDURE — G0009 ADMIN PNEUMOCOCCAL VACCINE: HCPCS

## 2019-03-19 PROCEDURE — 90732 PPSV23 VACC 2 YRS+ SUBQ/IM: CPT

## 2019-03-19 PROCEDURE — 93000 ELECTROCARDIOGRAM COMPLETE: CPT | Performed by: FAMILY MEDICINE

## 2019-03-19 PROCEDURE — G0402 INITIAL PREVENTIVE EXAM: HCPCS | Performed by: FAMILY MEDICINE

## 2019-03-19 RX ORDER — VALACYCLOVIR HYDROCHLORIDE 1 G/1
1000 TABLET, FILM COATED ORAL 2 TIMES DAILY
Qty: 30 TABLET | Refills: 0 | Status: SHIPPED | OUTPATIENT
Start: 2019-03-19 | End: 2019-04-03

## 2019-03-19 NOTE — PROGRESS NOTES
Assessment and Plan:  Problem List Items Addressed This Visit        Musculoskeletal and Integument    Closed compression fracture of L2 lumbar vertebra [facility])       Other    Special screening for malignant neoplasms, colon      Other Visit Diagnoses     Medicare welcome visit    -  Primary    Relevant Orders    POCT ECG (Completed)    Screening for colon cancer        Relevant Orders    Ambulatory referral to Gastroenterology    Cold sore        Relevant Medications    valACYclovir (VALTREX) 1,000 mg tablet        Health Maintenance Due   Topic Date Due    Hepatitis C Screening  1955    Medicare Annual Wellness Visit (AWV)  1955    CRC Screening: Colonoscopy  1955    Pneumococcal PPSV23/PCV13 65+ Years / Low and Medium Risk (2 of 2 - PPSV23) 06/05/2018         HPI:  Patient Active Problem List   Diagnosis    Hyponatremia    Anxiety    Benign essential hypertension    Raynaud's syndrome without gangrene    Special screening for malignant neoplasms, colon    Closed compression fracture of L2 lumbar vertebra ([address] )    Age-related osteoporosis with current pathological fracture with routine healing     Past Medical History:   Diagnosis Date    Hypertension      Past Surgical History:   Procedure Laterality Date    BREAST BIOPSY Right     stereotactic BX Benign     Family History   Problem Relation Age of Onset    Coronary artery disease Mother         early   Nesha Nine Coronary artery disease Father         early     Social History     Tobacco Use   Smoking Status Never Smoker   Smokeless Tobacco Never Used   Tobacco Comment    former smoker per allscripts,pack a day for about 20 years, quit 10 years ago     Social History     Substance and Sexual Activity   Alcohol Use Yes    Alcohol/week: 1 2 oz    Types: 2 Glasses of wine per week    Comment: daily      Social History     Substance and Sexual Activity   Drug Use No         Current Outpatient Medications   Medication Sig Dispense Refill    acetaminophen (TYLENOL) 325 mg tablet Take 2 tablets (650 mg total) by mouth every 6 (six) hours as needed for mild pain (Patient taking differently: Take 650 mg by mouth as needed for mild pain  ) 30 tablet 0    aspirin 81 MG tablet Take 81 mg by mouth daily      bimatoprost (LATISSE) 0 03 % ophthalmic solution Place one drop on applicator and apply evenly along the skin of the upper eyelid at base of eyelashes once daily at bedtime; repeat procedure for second eye (use a clean applicator)  5 mL 0    calcium carbonate (OS-FELICIA) 600 MG tablet Take 1,000 mg by mouth daily      lisinopril (ZESTRIL) 20 mg tablet Take 1 tablet (20 mg total) by mouth daily 90 tablet 0    methocarbamol (ROBAXIN) 750 mg tablet Take 1 tablet (750 mg total) by mouth every 6 (six) hours as needed for muscle spasms for up to 90 days 270 tablet 0    metoprolol tartrate (LOPRESSOR) 25 mg tablet TAKE ONE TABLET BY MOUTH EVERY 12 HOURS 180 tablet 0    Multiple Vitamins-Minerals (MULTIVITAMIN WITH MINERALS) tablet Take 1 tablet by mouth daily      oxyCODONE (ROXICODONE) 10 MG TABS Take 1-2 tabs every 4 hours as needed 60 tablet 0    polyethylene glycol (MIRALAX) 17 g packet Take 17 g by mouth daily (Patient taking differently: Take 17 g by mouth as needed  ) 30 each 0    valACYclovir (VALTREX) 1,000 mg tablet Take 1 tablet (1,000 mg total) by mouth 2 (two) times a day for 15 days 30 tablet 0     No current facility-administered medications for this visit        Allergies   Allergen Reactions    Penicillins      Immunization History   Administered Date(s) Administered    INFLUENZA 10/27/2015, 10/27/2015, 10/05/2016, 12/06/2018    Influenza Quadrivalent Preservative Free 3 years and older IM 10/05/2016    Influenza, high dose seasonal 0 5 mL 12/06/2018    Pneumococcal Conjugate 13-Valent 10/27/2015    Pneumococcal Conjugate PCV 7 10/27/2015    Tdap 03/28/2018    Zoster 11/27/2015       Patient Care Team:  Brendan Sharpe MD as PCP - Anabella Coreas MD    Medicare Screening Tests and Risk Assessments:  Roxane Almeida is here for her Welcome to Medicare visit  Last Medicare Wellness visit information reviewed, patient interviewed, no change since last AWV  Health Risk Assessment:  Patient rates overall health as excellent  Patient feels that their physical health rating is Slightly better  Eyesight was rated as Same  Hearing was rated as Same  Patient feels that their emotional and mental health rating is Same  Pain experienced by patient in the last 7 days has been Some  Patient's pain rating has been 3/10  Patient states that she has experienced no weight loss or gain in last 6 months  Emotional/Mental Health:  Patient has been feeling nervous/anxious  PHQ-9 Depression Screening:    Frequency of the following problems over the past two weeks:      1  Little interest or pleasure in doing things: 0 - not at all      2  Feeling down, depressed, or hopeless: 0 - not at all  PHQ-2 Score: 0          Broken Bones/Falls: Fall Risk Assessment:    In the past year, patient has experienced: History of falling in past year     Number of falls: 1  Patient does not feel she is unsteady standing  Patient is not taking medication that can cause feelings of lightheadedness or tiredness  Patient often has no need to rush to the toilet  Bladder/Bowel:  Patient has not leaked urine accidently in the last six months  Patient reports no loss of bowel control  Immunizations:  Patient has had a flu vaccination within the last year  Patient has not received a pneumonia shot  Patient has not received a shingles shot  Patient has not received tetanus/diphtheria shot  Home Safety:  Patient does not have trouble with stairs inside or outside of their home  Patient currently reports that there are no safety hazards present in home, working smoke alarms, working carbon monoxide detectors        Preventative Screenings:   Breast cancer screening performed, no colon cancer screen completed, cholesterol screen completed, glaucoma eye exam completed,     Nutrition:  Current diet: Regular with servings of the following:    Medications:  Patient is not currently taking any over-the-counter supplements  Patient is able to manage medications  Lifestyle Choices:  Patient reports no tobacco use  Patient has smoked or used tobacco in the past   Patient has stopped her tobacco use  Tobacco use quit date: 30 years ago   Patient reports alcohol use  Alcohol use per week: 5-6  Patient drives a vehicle  Patient wears seat belt  Activities of Daily Living:  Can get out of bed by his or her self, able to dress self, able to make own meals, able to do own shopping, able to bathe self, can do own laundry/housekeeping, can manage own money, pay bills and track expenses    Previous Hospitalizations:  No hospitalization or ED visit in past 12 months        Advanced Directives:  Patient has not decided on power of   Patient has spoken to designated power of   Patient has not completed advanced directive          Preventative Screening/Counseling:      Cardiovascular:      General: Risks and Benefits Discussed and Screening Current      Counseling: Healthy Diet          Diabetes:      General: Risks and Benefits Discussed and Screening Current      Counseling: Healthy Diet, Healthy Weight and Improve Physical Activity          Colorectal Cancer:      General: Risks and Benefits Discussed and Screening Current      Counseling: high fiber diet          Breast Cancer:      General: Risks and Benefits Discussed and Screening Current          Cervical Cancer:      General: Risks and Benefits Discussed and Screening Current          Osteoporosis:      General: Risks and Benefits Discussed      Counseling: Calcium and Vitamin D Intake and Regular Weightbearing Exercise          AAA:      General: Screening Not Indicated          Glaucoma: General: Screening Current and Risks and Benefits Discussed          HIV:      General: Risks and Benefits Discussed and Screening Not Indicated          Hepatitis C:      General: Risks and Benefits Discussed and Screening Current            No exam data present    Physical Exam:  Review of Systems   Constitutional: Negative  HENT: Negative  Eyes: Negative  Respiratory: Negative  Cardiovascular: Negative  Gastrointestinal: Negative  Negative for bowel incontinence  Endocrine: Negative  Genitourinary: Negative  Musculoskeletal: Negative  Skin: Negative  Allergic/Immunologic: Negative  Neurological: Negative  Hematological: Negative  Psychiatric/Behavioral: The patient is nervous/anxious  Vitals:    03/19/19 1444   BP: 126/82   BP Location: Left arm   Patient Position: Sitting   Cuff Size: Standard   Pulse: 62   Resp: 18   Temp: 97 7 °F (36 5 °C)   SpO2: 98%   Weight: 57 2 kg (126 lb)   Height: 5' 6" (1 676 m)   Body mass index is 20 34 kg/m²  Physical Exam   Constitutional: She is oriented to person, place, and time  She appears well-developed and well-nourished  HENT:   Head: Normocephalic and atraumatic  Eyes: Pupils are equal, round, and reactive to light  EOM are normal    Neck: Normal range of motion  Neck supple  Cardiovascular: Normal rate and regular rhythm  Pulmonary/Chest: Effort normal and breath sounds normal  No respiratory distress  She has no wheezes  Abdominal: Soft  Bowel sounds are normal    Musculoskeletal: Normal range of motion  She exhibits no deformity  Neurological: She is alert and oriented to person, place, and time  Skin: Skin is warm  Psychiatric: She has a normal mood and affect   Her behavior is normal

## 2019-03-21 NOTE — PROGRESS NOTES
OT Discharge     Today's date: 3/21/2019  Patient name: Marcelo Zaman  : 1955  MRN: [id number]  Referring provider: Isaac Calzada MD  Dx:   Encounter Diagnosis     ICD-10-CM    1  Closed extraarticular fracture of distal end of left radius with routine healing S52 552D        Start Time: 930  Stop Time: 376  Total time in clinic (min): 45 minutes    Assessment  Assessment details: Presents with L ECU irritation s/p L distal radius fracture  She demonstrates improved pain and tenderness since beginning therapy   strength is also greatly improved and approaching the strength in the contralateral hand  The ECU is less tender and also less tight than the initial evaluation  Impairments: abnormal or restricted ROM, activity intolerance, impaired physical strength, pain with function and weight-bearing intolerance    Symptom irritability: moderateUnderstanding of Dx/Px/POC: good   Prognosis: good  Prognosis details: Patient self discharged after last visit  Goals  STG: Patient will be compliant with home exercise program in 2 weeks-MET  STG: Pain will be reduced by 25% in 4 weeks  - PARTIALLY MET  STG: Strength will be improved by 25% in 4 weeks  - MET    LTG: Strength will be improved by 50% in 6-8 weeks  - MET  LTG: Performance in ADLs and IADLS will be improved to prior level of function with the affected extremity within 6 weeks  - PARTIALLY MET  LTG: Performance in work/school activity will be improved to prior level of function with the affected extremity within 6 weeks  - PARTIALLY MET  LTG: FOTO score increase by 20 points within 6 weeks  - PARTIALLY MET    NEW GOAL:  Able to tolerate weight bearing through wrist in order to perform yoga positions in 3 weeks  - PARTIALLY MET        Plan  Plan of Care beginning date: 2018  Plan of Care expiration date: 2019        Subjective Evaluation    History of Present Illness  Date of surgery: 2018  Mechanism of injury: trauma  Mechanism of injury: Loreto is s/p a L distal radius fracture in September, now presenting with residual ulnar sided wrist pain  Pain  At best pain ratin  At worst pain ratin (Intermitent pain to a 6/7 )  Quality: sharp and discomfort    Social Support    Employment status: working ([employer])  Hand dominance: right    Treatments  Current treatment: occupational therapy  Patient Goals  Patient goals for therapy: decreased pain, increased motion, increased strength and independence with ADLs/IADLs          Objective     Active Range of Motion     Left Elbow   Forearm supination: 70 degrees   Forearm pronation: 45 degrees     Left Wrist   Wrist flexion: 60 degrees   Wrist extension: 69 degrees   Radial deviation: 8 degrees   Ulnar deviation: 27 degrees     Right Wrist   Wrist flexion: 62 degrees   Wrist extension: 70 degrees   Radial deviation: 10 degrees   Ulnar deviation: 35 degrees     Additional Active Range of Motion Details  Wrist flexion in supination=50     Strength/Myotome Testing     Left Wrist/Hand      (2nd hand position)     Trial 1: 62 3    Right Wrist/Hand      (2nd hand position)     Trial 1: 67 9    Additional Strength Details  +GRIT testing (mild)     Tests     Left Wrist/Hand   Negative TFCC load       Additional Tests Details  +ECU synergy test (4/10)   -piano key  RROM ECU (2/10)  +table top lift test    Swelling     Left Wrist/Hand   Circumference wrist: 15 9 cm    Right Wrist/Hand   Circumference wrist: 15 5 cm      Flowsheet Rows      Most Recent Value   PT/OT G-Codes   Current Score  66   Projected Score  66

## 2019-04-18 DIAGNOSIS — I10 BENIGN ESSENTIAL HYPERTENSION: ICD-10-CM

## 2019-04-18 RX ORDER — LISINOPRIL 20 MG/1
TABLET ORAL
Qty: 90 TABLET | Refills: 0 | Status: SHIPPED | OUTPATIENT
Start: 2019-04-18 | End: 2019-07-22 | Stop reason: SDUPTHER

## 2019-05-21 ENCOUNTER — TELEPHONE (OUTPATIENT)
Dept: FAMILY MEDICINE CLINIC | Facility: CLINIC | Age: 64
End: 2019-05-21

## 2019-05-23 DIAGNOSIS — S32.020D CLOSED COMPRESSION FRACTURE OF L2 LUMBAR VERTEBRA WITH ROUTINE HEALING, SUBSEQUENT ENCOUNTER: ICD-10-CM

## 2019-05-23 RX ORDER — OXYCODONE HYDROCHLORIDE 10 MG/1
TABLET ORAL
Qty: 60 TABLET | Refills: 0 | Status: SHIPPED | OUTPATIENT
Start: 2019-05-23 | End: 2020-04-23 | Stop reason: ALTCHOICE

## 2019-07-18 ENCOUNTER — TELEPHONE (OUTPATIENT)
Dept: FAMILY MEDICINE CLINIC | Facility: CLINIC | Age: 64
End: 2019-07-18

## 2019-07-18 NOTE — TELEPHONE ENCOUNTER
Received a call from Beatrice Macias [id number] from [place], pt also wanted to refill Oxycodone filled in May 2019? Celeste's phone [phone]

## 2019-07-18 NOTE — TELEPHONE ENCOUNTER
Patient called and wants to refill:    lisinopril (ZESTRIL) 20 mg tablet   Quantity: 90   TAKE ONE TABLET BY MOUTH EVERY DAY    metoprolol tartrate (LOPRESSOR) 25 mg tablet   180 tablet  TAKE ONE TABLET BY MOUTH EVERY 12 HOURS    Please send to     [facility]  [address] [facility]  Phone [phone]  Fax [phone]    Also, patient wants to know to go about getting 2nd   denosumab (PROLIA) subcutaneous injection 60 mg since she no longer lives in [place]      PATIENT WANTS A PHONE CALL TO DISCUSS THE ABOVE INJECTION AND/OR OTHER OPTIONS

## 2019-07-19 DIAGNOSIS — S32.020D CLOSED COMPRESSION FRACTURE OF L2 LUMBAR VERTEBRA WITH ROUTINE HEALING, SUBSEQUENT ENCOUNTER: ICD-10-CM

## 2019-07-22 DIAGNOSIS — I10 BENIGN ESSENTIAL HYPERTENSION: ICD-10-CM

## 2019-07-22 RX ORDER — LISINOPRIL 20 MG/1
20 TABLET ORAL DAILY
Qty: 90 TABLET | Refills: 0 | Status: SHIPPED | OUTPATIENT
Start: 2019-07-22 | End: 2019-10-15 | Stop reason: SDUPTHER

## 2019-10-15 DIAGNOSIS — I10 BENIGN ESSENTIAL HYPERTENSION: ICD-10-CM

## 2019-10-15 RX ORDER — LISINOPRIL 20 MG/1
20 TABLET ORAL DAILY
Qty: 90 TABLET | Refills: 0 | Status: SHIPPED | OUTPATIENT
Start: 2019-10-15 | End: 2020-01-17 | Stop reason: SDUPTHER

## 2019-10-15 NOTE — TELEPHONE ENCOUNTER
PT was a Dr Sharma pt  recently moved to [place] has not found a PCP yet  Can we refill       Medication:metoprolol tartrate (LOPRESSOR) 25 mg tablet [[id number]]      Dosage:25 mg  How Often:1 tablet every 12 hours  Quantity:  180  Last Office Visit: 3-19-19  Next Office Visit:none  Last refilled:7-22-19  How many pills left:14  Pharmacy:     [facility] [address]   , [place] [address]  Phone: [phone] Fax: [phone]    Medication:lisinopril (ZESTRIL) 20 mg tablet [[id number]]      Dosage:20mg  How Often:1 x day  Quantity:  90  Last Office Visit: 3-19-19  Next Office Visit:none  Last refilled:7-22-19  How many pills left:7  Pharmacy:     [facility] [address]   , [facility], [address] [place] [address]  Phone: [phone] Fax: [phone]

## 2020-01-17 DIAGNOSIS — I10 BENIGN ESSENTIAL HYPERTENSION: ICD-10-CM

## 2020-01-17 RX ORDER — LISINOPRIL 20 MG/1
20 TABLET ORAL DAILY
Qty: 90 TABLET | Refills: 0 | Status: SHIPPED | OUTPATIENT
Start: 2020-01-17 | End: 2020-04-23 | Stop reason: SDUPTHER

## 2020-04-17 DIAGNOSIS — I10 BENIGN ESSENTIAL HYPERTENSION: ICD-10-CM

## 2020-04-17 RX ORDER — LISINOPRIL 20 MG/1
20 TABLET ORAL DAILY
Qty: 90 TABLET | Refills: 0 | OUTPATIENT
Start: 2020-04-17

## 2020-04-23 ENCOUNTER — TELEMEDICINE (OUTPATIENT)
Dept: FAMILY MEDICINE CLINIC | Facility: CLINIC | Age: 65
End: 2020-04-23

## 2020-04-23 VITALS
BODY MASS INDEX: 20.89 KG/M2 | DIASTOLIC BLOOD PRESSURE: 73 MMHG | SYSTOLIC BLOOD PRESSURE: 126 MMHG | WEIGHT: 130 LBS | HEIGHT: 66 IN | HEART RATE: 75 BPM

## 2020-04-23 DIAGNOSIS — I10 BENIGN ESSENTIAL HYPERTENSION: ICD-10-CM

## 2020-04-23 DIAGNOSIS — M80.00XD AGE-RELATED OSTEOPOROSIS WITH CURRENT PATHOLOGICAL FRACTURE WITH ROUTINE HEALING: Primary | ICD-10-CM

## 2020-04-23 PROCEDURE — 4040F PNEUMOC VAC/ADMIN/RCVD: CPT | Performed by: FAMILY MEDICINE

## 2020-04-23 PROCEDURE — 3074F SYST BP LT 130 MM HG: CPT | Performed by: FAMILY MEDICINE

## 2020-04-23 PROCEDURE — 3078F DIAST BP <80 MM HG: CPT | Performed by: FAMILY MEDICINE

## 2020-04-23 PROCEDURE — G0438 PPPS, INITIAL VISIT: HCPCS | Performed by: FAMILY MEDICINE

## 2020-04-23 PROCEDURE — 1170F FXNL STATUS ASSESSED: CPT | Performed by: FAMILY MEDICINE

## 2020-04-23 PROCEDURE — 1160F RVW MEDS BY RX/DR IN RCRD: CPT | Performed by: FAMILY MEDICINE

## 2020-04-23 PROCEDURE — 3008F BODY MASS INDEX DOCD: CPT | Performed by: FAMILY MEDICINE

## 2020-04-23 PROCEDURE — 1036F TOBACCO NON-USER: CPT | Performed by: FAMILY MEDICINE

## 2020-04-23 PROCEDURE — 1125F AMNT PAIN NOTED PAIN PRSNT: CPT | Performed by: FAMILY MEDICINE

## 2020-04-23 RX ORDER — ALENDRONATE SODIUM 70 MG/1
70 TABLET ORAL
Qty: 12 TABLET | Refills: 3 | Status: SHIPPED | OUTPATIENT
Start: 2020-04-23

## 2020-04-23 RX ORDER — LISINOPRIL 20 MG/1
20 TABLET ORAL DAILY
Qty: 90 TABLET | Refills: 0 | Status: SHIPPED | OUTPATIENT
Start: 2020-04-23 | End: 2020-07-23 | Stop reason: SDUPTHER

## 2020-07-23 DIAGNOSIS — I10 BENIGN ESSENTIAL HYPERTENSION: ICD-10-CM

## 2020-07-23 RX ORDER — LISINOPRIL 20 MG/1
20 TABLET ORAL DAILY
Qty: 90 TABLET | Refills: 0 | Status: SHIPPED | OUTPATIENT
Start: 2020-07-23

## 2024-02-21 PROBLEM — Z12.11 SPECIAL SCREENING FOR MALIGNANT NEOPLASMS, COLON: Status: RESOLVED | Noted: 2018-04-30 | Resolved: 2024-02-21

## 2024-12-20 NOTE — PROGRESS NOTES
Daily Note     Today's date: 1/15/2019  Patient name: Shant Mcclellan  : 1955  MRN: [id number]  Referring provider: Carlos Stratton MD  Dx:   Encounter Diagnosis     ICD-10-CM    1  Pain in left wrist M25 532    2  Closed extraarticular fracture of distal end of left radius with routine healing S52 552D                   Subjective: "It is doing better "      Objective: See treatment diary below  Assessment: Tolerated treatment well  Patient would benefit from continued OT  KT helps provide support and decrease pain with functional use of hand  Plan: Continue per plan of care         Specialty Daily Treatment Diary     Manual  12/20 1/2 1/4 1/8 1/11 1/15      IASTM ECU 10' 5 12' 10' 10' 10'      ECU stretching 3' 5 3' 3' 2' 3'      KT ECU X x X X  applied                                  Exercise Diary  12/20 1/2 1/4 1/8 1/11 1/15      HEP: ECU stretch, ECU isometrics, CFM Issued            Eccentric ECU  2# 2# 2# 3x10 3# 3x10  3# 3x10      Isometric sup/pro/flex/ext  GPB   GFB 2x10 GFB 2x10      Wrist maze   10x 10x        Extension web   30x  Yellow 30x Yellow 3x10  Yellow 3x10      Keypegs   1x 1x 1x  1x      Green ball on wall circles     3x10 3x10                                                                                                                                                                      Modalities 12/20 1/2 1/4 1/8 1/11 1/15      MHP 10'  10' 10' 3' 10' No